# Patient Record
Sex: FEMALE | Race: WHITE | NOT HISPANIC OR LATINO
[De-identification: names, ages, dates, MRNs, and addresses within clinical notes are randomized per-mention and may not be internally consistent; named-entity substitution may affect disease eponyms.]

---

## 2022-07-26 ENCOUNTER — TRANSCRIPTION ENCOUNTER (OUTPATIENT)
Age: 82
End: 2022-07-26

## 2022-08-07 ENCOUNTER — TRANSCRIPTION ENCOUNTER (OUTPATIENT)
Age: 82
End: 2022-08-07

## 2023-06-19 PROBLEM — Z00.00 ENCOUNTER FOR PREVENTIVE HEALTH EXAMINATION: Status: ACTIVE | Noted: 2023-06-19

## 2023-06-26 ENCOUNTER — APPOINTMENT (OUTPATIENT)
Dept: CT IMAGING | Facility: HOSPITAL | Age: 83
End: 2023-06-26

## 2023-08-02 ENCOUNTER — APPOINTMENT (OUTPATIENT)
Dept: NEUROLOGY | Facility: CLINIC | Age: 83
End: 2023-08-02

## 2023-08-02 ENCOUNTER — APPOINTMENT (OUTPATIENT)
Dept: NEUROLOGY | Facility: CLINIC | Age: 83
End: 2023-08-02
Payer: MEDICARE

## 2023-08-02 VITALS
HEIGHT: 67 IN | DIASTOLIC BLOOD PRESSURE: 73 MMHG | SYSTOLIC BLOOD PRESSURE: 136 MMHG | BODY MASS INDEX: 33.74 KG/M2 | HEART RATE: 67 BPM | WEIGHT: 215 LBS

## 2023-08-02 VITALS
SYSTOLIC BLOOD PRESSURE: 136 MMHG | WEIGHT: 215 LBS | TEMPERATURE: 98.1 F | OXYGEN SATURATION: 94 % | HEART RATE: 67 BPM | BODY MASS INDEX: 33.74 KG/M2 | DIASTOLIC BLOOD PRESSURE: 73 MMHG | HEIGHT: 67 IN

## 2023-08-02 DIAGNOSIS — Z78.9 OTHER SPECIFIED HEALTH STATUS: ICD-10-CM

## 2023-08-02 DIAGNOSIS — I10 ESSENTIAL (PRIMARY) HYPERTENSION: ICD-10-CM

## 2023-08-02 DIAGNOSIS — R26.9 UNSPECIFIED ABNORMALITIES OF GAIT AND MOBILITY: ICD-10-CM

## 2023-08-02 DIAGNOSIS — R90.89 OTHER ABNORMAL FINDINGS ON DIAGNOSTIC IMAGING OF CENTRAL NERVOUS SYSTEM: ICD-10-CM

## 2023-08-02 DIAGNOSIS — R41.89 OTHER SYMPTOMS AND SIGNS INVOLVING COGNITIVE FUNCTIONS AND AWARENESS: ICD-10-CM

## 2023-08-02 PROCEDURE — 99215 OFFICE O/P EST HI 40 MIN: CPT

## 2023-08-02 RX ORDER — AMLODIPINE BESYLATE 5 MG/1
5 TABLET ORAL
Refills: 0 | Status: ACTIVE | COMMUNITY

## 2023-08-02 NOTE — PHYSICAL EXAM
[FreeTextEntry1] : Head:  Normocephalic Neck: Supple nontender no carotid bruits.    Mental Status:  Alert oriented to month date and the day of the week but not the year.  Recalls 1/3 words in 5 minutes.  There is confusion and some confabulation and a limited insight and judgment.  Cranial Nerves:  PERRL,  Visual Fields full  EOMI no diplopia no ptosis no nystagmus, V through XII intact.  Motor: No drift paratonia no dysmetria or focality.  Generally somewhat weak.  DTRs: Symmetric   Plantars flexor.  No Clonus.  Sensory: Equal pin and touch.  Decreased vibration distally in the lowers.  Gait: Unsteady spinal deformity uses the walker.

## 2023-08-02 NOTE — HISTORY OF PRESENT ILLNESS
[FreeTextEntry1] : This patient is seen for an office consultation with her son.  She is an 82-year-old female who has chronic cognitive impairment and unsteady gait.  She was hospitalized approximately year ago for UTI and spent time in rehabilitation and since then for approximately the last 1 year she has resided at the [facility] assisted living [facility] in [place].  She is doing well in this regard.  There is confusion and disorientation and she has problems with memory and executive functioning.  She has an unsteady gait and uses a walker.  There is no history of head trauma stroke seizures focal neurological complaints.  Past history significant for abscess followed by nephrectomy hypertension and venous insufficiency.  Medications have been reviewed.  MRI of the brain was performed noncontrast on 4/26/2022.  This study revealed moderate changes of microvascular disease.  There were foci of microhemorrhages in the left hemisphere with a differential diagnosis including amyloid angiopathy.  There was moderate atrophy without hydrocephalus.

## 2023-08-02 NOTE — ASSESSMENT
[FreeTextEntry1] : Impression: 82-year-old female patient with hypertension and status post nephrectomy for abscess and also venous insufficiency has a presentation not consistent with cognitive decline and dementia.  She also has a gait disorder which is probably multifactorial.  She has an MRI of the brain which is abnormal consistent with microvascular apathy and also there are microhemorrhages.  This finding is nonspecific and could be consistent with amyloid angiopathy but this is not a definite diagnosis in my opinion in this setting.  Doubt normal pressure hydrocephalus.  Neurological exam is without focality to suggest CVA.  Recommendations: Begin donepezil 5 mg at bedtime and after 1 month consider increase to 10 mg at bedtime.  Office follow-up in 3 months or else as needed.  Fall prevention and physical therapy.  Discussed with the patient's son was present at time of the visit.

## 2023-08-02 NOTE — CONSULT LETTER
[Dear  ___] : Dear  [unfilled], [Consult Letter:] : I had the pleasure of evaluating your patient, [unfilled]. [Please see my note below.] : Please see my note below. [Consult Closing:] : Thank you very much for allowing me to participate in the care of this patient.  If you have any questions, please do not hesitate to contact me. [Sincerely,] : Sincerely, [FreeTextEntry3] : Blake Paul MD

## 2024-01-01 ENCOUNTER — EMERGENCY (EMERGENCY)
Facility: HOSPITAL | Age: 84
LOS: 1 days | Discharge: HOME | End: 2024-01-01
Attending: EMERGENCY MEDICINE | Admitting: EMERGENCY MEDICINE
Payer: MEDICARE

## 2024-01-01 VITALS
RESPIRATION RATE: 18 BRPM | OXYGEN SATURATION: 96 % | DIASTOLIC BLOOD PRESSURE: 75 MMHG | TEMPERATURE: 98 F | SYSTOLIC BLOOD PRESSURE: 165 MMHG | HEART RATE: 81 BPM

## 2024-01-01 VITALS
WEIGHT: 195.11 LBS | TEMPERATURE: 98 F | OXYGEN SATURATION: 95 % | DIASTOLIC BLOOD PRESSURE: 86 MMHG | RESPIRATION RATE: 18 BRPM | SYSTOLIC BLOOD PRESSURE: 162 MMHG | HEART RATE: 62 BPM

## 2024-01-01 LAB
ALBUMIN SERPL ELPH-MCNC: 3.1 G/DL — LOW (ref 3.3–5)
ALP SERPL-CCNC: 57 U/L — SIGNIFICANT CHANGE UP (ref 40–120)
ALT FLD-CCNC: 17 U/L — SIGNIFICANT CHANGE UP (ref 10–45)
ANION GAP SERPL CALC-SCNC: 12 MMOL/L — SIGNIFICANT CHANGE UP (ref 5–17)
APPEARANCE UR: CLEAR — SIGNIFICANT CHANGE UP
APTT BLD: 33.6 SEC — SIGNIFICANT CHANGE UP (ref 24.5–35.6)
AST SERPL-CCNC: 20 U/L — SIGNIFICANT CHANGE UP (ref 10–40)
BASOPHILS # BLD AUTO: 0.05 K/UL — SIGNIFICANT CHANGE UP (ref 0–0.2)
BASOPHILS NFR BLD AUTO: 0.5 % — SIGNIFICANT CHANGE UP (ref 0–2)
BILIRUB SERPL-MCNC: 0.6 MG/DL — SIGNIFICANT CHANGE UP (ref 0.2–1.2)
BILIRUB UR-MCNC: NEGATIVE — SIGNIFICANT CHANGE UP
BUN SERPL-MCNC: 36 MG/DL — HIGH (ref 7–23)
CALCIUM SERPL-MCNC: 9 MG/DL — SIGNIFICANT CHANGE UP (ref 8.4–10.5)
CHLORIDE SERPL-SCNC: 106 MMOL/L — SIGNIFICANT CHANGE UP (ref 96–108)
CO2 SERPL-SCNC: 22 MMOL/L — SIGNIFICANT CHANGE UP (ref 22–31)
COLOR SPEC: YELLOW — SIGNIFICANT CHANGE UP
CREAT SERPL-MCNC: 1.16 MG/DL — SIGNIFICANT CHANGE UP (ref 0.5–1.3)
CULTURE RESULTS: SIGNIFICANT CHANGE UP
DIFF PNL FLD: NEGATIVE — SIGNIFICANT CHANGE UP
EGFR: 47 ML/MIN/1.73M2 — LOW
EGFR: 47 ML/MIN/1.73M2 — LOW
EOSINOPHIL # BLD AUTO: 0.39 K/UL — SIGNIFICANT CHANGE UP (ref 0–0.5)
EOSINOPHIL NFR BLD AUTO: 3.6 % — SIGNIFICANT CHANGE UP (ref 0–6)
GLUCOSE SERPL-MCNC: 85 MG/DL — SIGNIFICANT CHANGE UP (ref 70–99)
GLUCOSE UR QL: NEGATIVE MG/DL — SIGNIFICANT CHANGE UP
HCT VFR BLD CALC: 35.9 % — SIGNIFICANT CHANGE UP (ref 34.5–45)
HGB BLD-MCNC: 11.7 G/DL — SIGNIFICANT CHANGE UP (ref 11.5–15.5)
IMM GRANULOCYTES NFR BLD AUTO: 0.4 % — SIGNIFICANT CHANGE UP (ref 0–0.9)
INR BLD: 1.34 RATIO — HIGH (ref 0.85–1.16)
KETONES UR-MCNC: ABNORMAL MG/DL
LACTATE SERPL-SCNC: 1.1 MMOL/L — SIGNIFICANT CHANGE UP (ref 0.7–2)
LEUKOCYTE ESTERASE UR-ACNC: NEGATIVE — SIGNIFICANT CHANGE UP
LIDOCAIN IGE QN: 16 U/L — SIGNIFICANT CHANGE UP (ref 16–77)
LYMPHOCYTES # BLD AUTO: 1.64 K/UL — SIGNIFICANT CHANGE UP (ref 1–3.3)
LYMPHOCYTES # BLD AUTO: 15 % — SIGNIFICANT CHANGE UP (ref 13–44)
MCHC RBC-ENTMCNC: 28.1 PG — SIGNIFICANT CHANGE UP (ref 27–34)
MCHC RBC-ENTMCNC: 32.6 GM/DL — SIGNIFICANT CHANGE UP (ref 32–36)
MCV RBC AUTO: 86.3 FL — SIGNIFICANT CHANGE UP (ref 80–100)
MONOCYTES # BLD AUTO: 1.39 K/UL — HIGH (ref 0–0.9)
MONOCYTES NFR BLD AUTO: 12.7 % — SIGNIFICANT CHANGE UP (ref 2–14)
NEUTROPHILS # BLD AUTO: 7.42 K/UL — HIGH (ref 1.8–7.4)
NEUTROPHILS NFR BLD AUTO: 67.8 % — SIGNIFICANT CHANGE UP (ref 43–77)
NITRITE UR-MCNC: NEGATIVE — SIGNIFICANT CHANGE UP
NRBC # BLD: 0 /100 WBCS — SIGNIFICANT CHANGE UP (ref 0–0)
NRBC BLD-RTO: 0 /100 WBCS — SIGNIFICANT CHANGE UP (ref 0–0)
PH UR: 5.5 — SIGNIFICANT CHANGE UP (ref 5–8)
PLATELET # BLD AUTO: 242 K/UL — SIGNIFICANT CHANGE UP (ref 150–400)
POTASSIUM SERPL-MCNC: 3.9 MMOL/L — SIGNIFICANT CHANGE UP (ref 3.5–5.3)
POTASSIUM SERPL-SCNC: 3.9 MMOL/L — SIGNIFICANT CHANGE UP (ref 3.5–5.3)
PROT SERPL-MCNC: 7.2 G/DL — SIGNIFICANT CHANGE UP (ref 6–8.3)
PROT UR-MCNC: 100 MG/DL
PROTHROM AB SERPL-ACNC: 15.8 SEC — HIGH (ref 9.9–13.4)
RBC # BLD: 4.16 M/UL — SIGNIFICANT CHANGE UP (ref 3.8–5.2)
RBC # FLD: 14.6 % — HIGH (ref 10.3–14.5)
SODIUM SERPL-SCNC: 140 MMOL/L — SIGNIFICANT CHANGE UP (ref 135–145)
SP GR SPEC: 1.02 — SIGNIFICANT CHANGE UP (ref 1–1.03)
SPECIMEN SOURCE: SIGNIFICANT CHANGE UP
TROPONIN I, HIGH SENSITIVITY RESULT: 9.3 NG/L — SIGNIFICANT CHANGE UP
UROBILINOGEN FLD QL: 1 MG/DL — SIGNIFICANT CHANGE UP (ref 0.2–1)
WBC # BLD: 10.93 K/UL — HIGH (ref 3.8–10.5)
WBC # FLD AUTO: 10.93 K/UL — HIGH (ref 3.8–10.5)

## 2024-01-01 PROCEDURE — 70450 CT HEAD/BRAIN W/O DYE: CPT | Mod: 26,MC

## 2024-01-01 PROCEDURE — 83690 ASSAY OF LIPASE: CPT

## 2024-01-01 PROCEDURE — 93005 ELECTROCARDIOGRAM TRACING: CPT

## 2024-01-01 PROCEDURE — 73562 X-RAY EXAM OF KNEE 3: CPT

## 2024-01-01 PROCEDURE — 87086 URINE CULTURE/COLONY COUNT: CPT

## 2024-01-01 PROCEDURE — 84484 ASSAY OF TROPONIN QUANT: CPT

## 2024-01-01 PROCEDURE — 71045 X-RAY EXAM CHEST 1 VIEW: CPT | Mod: 26

## 2024-01-01 PROCEDURE — 36415 COLL VENOUS BLD VENIPUNCTURE: CPT

## 2024-01-01 PROCEDURE — 99285 EMERGENCY DEPT VISIT HI MDM: CPT | Mod: 25

## 2024-01-01 PROCEDURE — 71045 X-RAY EXAM CHEST 1 VIEW: CPT

## 2024-01-01 PROCEDURE — 81001 URINALYSIS AUTO W/SCOPE: CPT

## 2024-01-01 PROCEDURE — 83605 ASSAY OF LACTIC ACID: CPT

## 2024-01-01 PROCEDURE — 93010 ELECTROCARDIOGRAM REPORT: CPT

## 2024-01-01 PROCEDURE — 70450 CT HEAD/BRAIN W/O DYE: CPT | Mod: MC

## 2024-01-01 PROCEDURE — 73030 X-RAY EXAM OF SHOULDER: CPT | Mod: 26,LT

## 2024-01-01 PROCEDURE — 73030 X-RAY EXAM OF SHOULDER: CPT

## 2024-01-01 PROCEDURE — 73522 X-RAY EXAM HIPS BI 3-4 VIEWS: CPT | Mod: 26

## 2024-01-01 PROCEDURE — 82962 GLUCOSE BLOOD TEST: CPT

## 2024-01-01 PROCEDURE — 73522 X-RAY EXAM HIPS BI 3-4 VIEWS: CPT

## 2024-01-01 PROCEDURE — 85730 THROMBOPLASTIN TIME PARTIAL: CPT

## 2024-01-01 PROCEDURE — 99285 EMERGENCY DEPT VISIT HI MDM: CPT

## 2024-01-01 PROCEDURE — 73562 X-RAY EXAM OF KNEE 3: CPT | Mod: 26,50

## 2024-01-01 PROCEDURE — 80053 COMPREHEN METABOLIC PANEL: CPT

## 2024-01-01 PROCEDURE — 85610 PROTHROMBIN TIME: CPT

## 2024-01-01 PROCEDURE — 85025 COMPLETE CBC W/AUTO DIFF WBC: CPT

## 2024-01-01 RX ADMIN — Medication 1000 MILLILITER(S): at 00:28

## 2024-01-10 ENCOUNTER — APPOINTMENT (OUTPATIENT)
Dept: NEUROLOGY | Facility: CLINIC | Age: 84
End: 2024-01-10

## 2024-04-05 ENCOUNTER — NON-APPOINTMENT (OUTPATIENT)
Age: 84
End: 2024-04-05

## 2024-09-29 ENCOUNTER — INPATIENT (INPATIENT)
Facility: HOSPITAL | Age: 84
LOS: 14 days | Discharge: NURSING, REHAB OR LONG-TERM CARE | DRG: 546 | End: 2024-10-14
Attending: STUDENT IN AN ORGANIZED HEALTH CARE EDUCATION/TRAINING PROGRAM | Admitting: STUDENT IN AN ORGANIZED HEALTH CARE EDUCATION/TRAINING PROGRAM
Payer: MEDICARE

## 2024-09-29 VITALS
DIASTOLIC BLOOD PRESSURE: 103 MMHG | HEART RATE: 73 BPM | OXYGEN SATURATION: 96 % | WEIGHT: 199.96 LBS | TEMPERATURE: 98 F | SYSTOLIC BLOOD PRESSURE: 178 MMHG | RESPIRATION RATE: 22 BRPM | HEIGHT: 67 IN

## 2024-09-29 DIAGNOSIS — R41.82 ALTERED MENTAL STATUS, UNSPECIFIED: ICD-10-CM

## 2024-09-29 LAB
ALBUMIN SERPL ELPH-MCNC: 3.7 G/DL — SIGNIFICANT CHANGE UP (ref 3.3–5)
ALP SERPL-CCNC: 64 U/L — SIGNIFICANT CHANGE UP (ref 40–120)
ALT FLD-CCNC: 16 U/L — SIGNIFICANT CHANGE UP (ref 10–45)
ANION GAP SERPL CALC-SCNC: 11 MMOL/L — SIGNIFICANT CHANGE UP (ref 5–17)
APPEARANCE UR: ABNORMAL
AST SERPL-CCNC: 23 U/L — SIGNIFICANT CHANGE UP (ref 10–40)
BACTERIA # UR AUTO: NEGATIVE /HPF — SIGNIFICANT CHANGE UP
BASOPHILS # BLD AUTO: 0.04 K/UL — SIGNIFICANT CHANGE UP (ref 0–0.2)
BASOPHILS NFR BLD AUTO: 0.3 % — SIGNIFICANT CHANGE UP (ref 0–2)
BILIRUB SERPL-MCNC: 0.5 MG/DL — SIGNIFICANT CHANGE UP (ref 0.2–1.2)
BILIRUB UR-MCNC: NEGATIVE — SIGNIFICANT CHANGE UP
BUN SERPL-MCNC: 42 MG/DL — HIGH (ref 7–23)
CALCIUM SERPL-MCNC: 9.4 MG/DL — SIGNIFICANT CHANGE UP (ref 8.4–10.5)
CAST: 4 /LPF — SIGNIFICANT CHANGE UP (ref 0–4)
CHLORIDE SERPL-SCNC: 107 MMOL/L — SIGNIFICANT CHANGE UP (ref 96–108)
CO2 SERPL-SCNC: 24 MMOL/L — SIGNIFICANT CHANGE UP (ref 22–31)
COLOR SPEC: YELLOW — SIGNIFICANT CHANGE UP
CREAT SERPL-MCNC: 1.03 MG/DL — SIGNIFICANT CHANGE UP (ref 0.5–1.3)
DIFF PNL FLD: NEGATIVE — SIGNIFICANT CHANGE UP
EGFR: 54 ML/MIN/1.73M2 — LOW
EOSINOPHIL # BLD AUTO: 0.17 K/UL — SIGNIFICANT CHANGE UP (ref 0–0.5)
EOSINOPHIL NFR BLD AUTO: 1.4 % — SIGNIFICANT CHANGE UP (ref 0–6)
FINE GRAN CASTS #/AREA URNS AUTO: PRESENT
FLUAV AG NPH QL: SIGNIFICANT CHANGE UP
FLUBV AG NPH QL: SIGNIFICANT CHANGE UP
GAS PNL BLDV: SIGNIFICANT CHANGE UP
GLUCOSE SERPL-MCNC: 108 MG/DL — HIGH (ref 70–99)
GLUCOSE UR QL: NEGATIVE MG/DL — SIGNIFICANT CHANGE UP
HCT VFR BLD CALC: 37 % — SIGNIFICANT CHANGE UP (ref 34.5–45)
HGB BLD-MCNC: 11.6 G/DL — SIGNIFICANT CHANGE UP (ref 11.5–15.5)
IMM GRANULOCYTES NFR BLD AUTO: 0.6 % — SIGNIFICANT CHANGE UP (ref 0–0.9)
KETONES UR-MCNC: NEGATIVE MG/DL — SIGNIFICANT CHANGE UP
LEUKOCYTE ESTERASE UR-ACNC: NEGATIVE — SIGNIFICANT CHANGE UP
LYMPHOCYTES # BLD AUTO: 1.42 K/UL — SIGNIFICANT CHANGE UP (ref 1–3.3)
LYMPHOCYTES # BLD AUTO: 11.4 % — LOW (ref 13–44)
MCHC RBC-ENTMCNC: 27.4 PG — SIGNIFICANT CHANGE UP (ref 27–34)
MCHC RBC-ENTMCNC: 31.4 GM/DL — LOW (ref 32–36)
MCV RBC AUTO: 87.3 FL — SIGNIFICANT CHANGE UP (ref 80–100)
MONOCYTES # BLD AUTO: 1.42 K/UL — HIGH (ref 0–0.9)
MONOCYTES NFR BLD AUTO: 11.4 % — SIGNIFICANT CHANGE UP (ref 2–14)
NEUTROPHILS # BLD AUTO: 9.38 K/UL — HIGH (ref 1.8–7.4)
NEUTROPHILS NFR BLD AUTO: 74.9 % — SIGNIFICANT CHANGE UP (ref 43–77)
NITRITE UR-MCNC: NEGATIVE — SIGNIFICANT CHANGE UP
NRBC # BLD: 0 /100 WBCS — SIGNIFICANT CHANGE UP (ref 0–0)
PH UR: 5.5 — SIGNIFICANT CHANGE UP (ref 5–8)
PLATELET # BLD AUTO: 275 K/UL — SIGNIFICANT CHANGE UP (ref 150–400)
POTASSIUM SERPL-MCNC: 4.4 MMOL/L — SIGNIFICANT CHANGE UP (ref 3.5–5.3)
POTASSIUM SERPL-SCNC: 4.4 MMOL/L — SIGNIFICANT CHANGE UP (ref 3.5–5.3)
PROT SERPL-MCNC: 7.4 G/DL — SIGNIFICANT CHANGE UP (ref 6–8.3)
PROT UR-MCNC: 100 MG/DL
RBC # BLD: 4.24 M/UL — SIGNIFICANT CHANGE UP (ref 3.8–5.2)
RBC # FLD: 14.9 % — HIGH (ref 10.3–14.5)
RBC CASTS # UR COMP ASSIST: 1 /HPF — SIGNIFICANT CHANGE UP (ref 0–4)
REVIEW: SIGNIFICANT CHANGE UP
RSV RNA NPH QL NAA+NON-PROBE: SIGNIFICANT CHANGE UP
SARS-COV-2 RNA SPEC QL NAA+PROBE: SIGNIFICANT CHANGE UP
SODIUM SERPL-SCNC: 142 MMOL/L — SIGNIFICANT CHANGE UP (ref 135–145)
SP GR SPEC: 1.02 — SIGNIFICANT CHANGE UP (ref 1–1.03)
SQUAMOUS # UR AUTO: 2 /HPF — SIGNIFICANT CHANGE UP (ref 0–5)
TROPONIN T, HIGH SENSITIVITY RESULT: 23 NG/L — SIGNIFICANT CHANGE UP (ref 0–51)
UROBILINOGEN FLD QL: 1 MG/DL — SIGNIFICANT CHANGE UP (ref 0.2–1)
WBC # BLD: 12.51 K/UL — HIGH (ref 3.8–10.5)
WBC # FLD AUTO: 12.51 K/UL — HIGH (ref 3.8–10.5)
WBC UR QL: 0 /HPF — SIGNIFICANT CHANGE UP (ref 0–5)

## 2024-09-29 PROCEDURE — 71045 X-RAY EXAM CHEST 1 VIEW: CPT | Mod: 26

## 2024-09-29 PROCEDURE — 99223 1ST HOSP IP/OBS HIGH 75: CPT | Mod: GC

## 2024-09-29 PROCEDURE — 99285 EMERGENCY DEPT VISIT HI MDM: CPT | Mod: GC

## 2024-09-29 PROCEDURE — 70450 CT HEAD/BRAIN W/O DYE: CPT | Mod: 26,MC

## 2024-09-29 RX ORDER — SODIUM CHLORIDE 0.9 % (FLUSH) 0.9 %
1000 SYRINGE (ML) INJECTION ONCE
Refills: 0 | Status: COMPLETED | OUTPATIENT
Start: 2024-09-29 | End: 2024-09-29

## 2024-09-29 RX ADMIN — Medication 1000 MILLILITER(S): at 19:26

## 2024-09-29 NOTE — H&P ADULT - ATTENDING COMMENTS
83F w/PMHx of dementia  from nursing facility p/w AMS x 1 week , rapid decline in function , recurrent falls , VSS , opens eyes to verbal and tactile stimuli ,  A&Ox 0, NAD,  no focal neurologic deficits , labs w/ leukocytosis , CTH w/ no acute pathology , ekg nsr ; unclear etiology for acute decline in function , possible progression of dementia , will r/o reversible causes , would obtain MR brain , Check VEEG , TSH/ B12/ Syphilis screen, if not improving can obtain non emergent neuro consult during daytime, would also consider empiric treatment w/ thiamine and monitor for improvement

## 2024-09-29 NOTE — ED PROVIDER NOTE - PHYSICAL EXAMINATION
Appearance: Slumped to R side  HEENT: Extra ocular movements intact; PERRLA  Respiratory: Normal respiratory pattern; symmetric breath sounds clear to auscultation and percussion. Good air entry.  Cardiovascular: Regular rate and variability; Normal S1, S2; No S3, S4; no murmur;   Abdomen: Abdomen soft; no distension; no tenderness; no masses or organomegaly  Neurology: Affect appropriate; interactive; verbalization clear and understandable for age; CN II-XII intact; sensation grossly intact to touch;  Psych: AOx1  Skin: Wrapped wound on L lower leg, s/p plaque removal from venous insufficiency

## 2024-09-29 NOTE — H&P ADULT - TIME BILLING
Chart review , case discussion with ED and other provider , obtain history via assistance of family ,  examination of patient ,  review/ ordering labs and medications , and documentation

## 2024-09-29 NOTE — H&P ADULT - PROBLEM SELECTOR PLAN 4
Full code as per HCP and son   Diet: NPO as patient is aspiration risk and AMS   GI: Protonix Full code as per HCP and son   PPx: heparin subq  Diet: pureed with thick liquid  GI: Protonix

## 2024-09-29 NOTE — ED PROVIDER NOTE - PROGRESS NOTE DETAILS
Dolly CORTES PGY1: Called Dr Nelson's answering service,  will contact Dr nelson who will then call back.

## 2024-09-29 NOTE — ED ADULT NURSE NOTE - CHIEF COMPLAINT QUOTE
weak, slumped to R side x 3 days, hx dementia, mostly w/c bound, sent from [facility], recent head Ct: normal

## 2024-09-29 NOTE — ED PROVIDER NOTE - OBJECTIVE STATEMENT
GHULAM is a 84 y/o F, PMH of dementia AO3 at baseline, presenting for AMS of 1 week duration. Patient resides in a SNF, she has memory loss but is oriented to her surroundings at baseline. In the past week, she has had a rapid decline in her mental status. She is disoriented, unable to walk, and unable to feed herself. She has fallen twice. She presented at [facility] on Wednesday, workup was unremarkable (CT Head, X-Rays, UA, CBC, Troponin). Over the past three days, decline has continued and she is now slumped on her R side. She does not report pain, no chest pain/pressure, no shortness of breath, no dsyuria.

## 2024-09-29 NOTE — H&P ADULT - CONVERSATION DETAILS
Spoke with pt's son who shares partial HCP with pt's daughter. Son expressed that patient is full code and would accept tube feeds as well if needed.

## 2024-09-29 NOTE — H&P ADULT - PROBLEM SELECTOR PLAN 1
Pt with hx of dementia on Donepezil with rapid decline in mentation / ADLs in the past week as per son. Patient baseline is A&Ox3 last known normal mentation was Wednesday 9/25. Infectious workup (UA, RVP) negative so far. Lungs clear and CTH negative for acute process    DDx: infectious process vs progression of dementia vs traumatic injury    Plan:   - F/u UCx  - CTM fever curve and WBC count  - Aspiration precaution Pt with hx of dementia on Donepezil with rapid decline in mentation / ADLs in the past week as per son. Patient baseline is A&Ox3 last known normal mentation was Wednesday 9/25. Infectious workup (UA, RVP) negative so far. Lungs clear and CTH negative for acute process    DDx: infectious process vs progression of dementia vs traumatic injury    Plan:   - F/u UCx  - F/u B12, Folate, TSH, T4, thiamine  - MRI Head w/o contrast  - vEEG  - Possible neurology consult in AM if AMS persists   - CTM fever curve and WBC count  - Aspiration precaution

## 2024-09-29 NOTE — H&P ADULT - NSHPPHYSICALEXAM_GEN_ALL_CORE
Gen: no acute distress  HEENT: atraumatic, normocephalic, pupils equally round and reactive to light, extraocular muscles intact, no conjunctival injection  CV: regular rate and rhythym, normal S1/S2, no murmurs, rubs, or gallops  Resp: lungs clear to auscultation bilaterally, no rales, rhonchi, or wheezes  GI: soft, nontender, nondistended, BSx4  MSK: extremities atraumatic, no cyanosis or clubbing  Skin: warm, dry, no rashes or lesions  Neuro: no focal deficits, sensation grossly intact  Psych: alert and oriented x3, appropriate mood and affect Gen: no acute distress  HEENT: atraumatic, normocephalic, pupils equally round and reactive to light, extraocular muscles intact, no conjunctival injection  CV: regular rate and rhythym, normal S1/S2, systolic ejection murmur loudest at tricuspid area  Resp: lungs clear to auscultation bilaterally, no rales, rhonchi, or wheezes  GI: soft, nontender, nondistended, BSx4  MSK: extremities atraumatic, no cyanosis or clubbing  Skin: warm, dry, wrapped area of lower left leg near calf. dressing intact and dry with no bloody or purulent discharge  Neuro: no focal deficits, sensation grossly intact  Psych: alert and oriented x 0

## 2024-09-29 NOTE — H&P ADULT - PROBLEM SELECTOR PLAN 2
Pt with 2 falls in the past week. Last fall 9/25 with strike to L shoulder and L hip. CTH non con negative, EKG NSR, Trops negative x 2.     DDx: mechanical fall vs physical debility vs seizure vs arrhythmogenic cause    Plan:  - XR left hip and shoulder to assess for fx   - Orthostatic vitals once patient more alert and oriented  - Pain control as needed with tylenol, lidocaine patches  - PT eval   - Fall precaution Pt with 2 falls in the past week. Last fall 9/25 with strike to L shoulder and L hip. CTH non con negative, EKG NSR, Trops negative x 2.     DDx: mechanical fall vs physical debility vs seizure vs arrhythmogenic cause    Plan:  - Orthostatic vitals once patient more alert and oriented  - Pain control as needed with tylenol, lidocaine patches  - PT eval   - Fall precaution

## 2024-09-29 NOTE — H&P ADULT - ASSESSMENT
84 y/o F with PMHx of dementia A&Ox3 at baseline, presenting for AMS of 1 week duration and admitted for AMS workup.

## 2024-09-29 NOTE — H&P ADULT - NSHPLABSRESULTS_GEN_ALL_CORE
.  LABS:                         11.6   12.51 )-----------( 275      ( 29 Sep 2024 13:48 )             37.0         142  |  107  |  42[H]  ----------------------------<  108[H]  4.4   |  24  |  1.03    Ca    9.4      29 Sep 2024 13:48    TPro  7.4  /  Alb  3.7  /  TBili  0.5  /  DBili  x   /  AST  23  /  ALT  16  /  AlkPhos  64        Urinalysis Basic - ( 29 Sep 2024 21:08 )    Color: Yellow / Appearance: Cloudy / S.023 / pH: x  Gluc: x / Ketone: Negative mg/dL  / Bili: Negative / Urobili: 1.0 mg/dL   Blood: x / Protein: 100 mg/dL / Nitrite: Negative   Leuk Esterase: Negative / RBC: 1 /HPF / WBC 0 /HPF   Sq Epi: x / Non Sq Epi: 2 /HPF / Bacteria: Negative /HPF            RADIOLOGY, EKG & ADDITIONAL TESTS: Reviewed.

## 2024-09-29 NOTE — ED ADULT NURSE NOTE - NSFALLHARMRISKINTERV_ED_ALL_ED
Assistance OOB with selected safe patient handling equipment if applicable/Assistance with ambulation/Communicate risk of Fall with Harm to all staff, patient, and family/Monitor gait and stability/Monitor for mental status changes and reorient to person, place, and time, as needed/Move patient closer to nursing station/within visual sight of ED staff/Provide patient with walking aids/Provide visual cue: red socks, yellow wristband, yellow gown, etc/Reinforce activity limits and safety measures with patient and family/Toileting schedule using arm’s reach rule for commode and bathroom/Use of alarms - bed, stretcher, chair and/or video monitoring/Bed in lowest position, wheels locked, appropriate side rails in place/Call bell, personal items and telephone in reach/Instruct patient to call for assistance before getting out of bed/chair/stretcher/Non-slip footwear applied when patient is off stretcher/[place] to call system/Physically safe environment - no spills, clutter or unnecessary equipment/Purposeful Proactive Rounding/Room/bathroom lighting operational, light cord in reach

## 2024-09-29 NOTE — ED ADULT NURSE REASSESSMENT NOTE - NS ED NURSE REASSESS COMMENT FT1
Patient straight cathed using sterile technique. Second RN at bedside to confirm sterility. Patient tolerated procedure well. Output of approximately 300cc's of phoebe clear urine. Sterile specimens collected and sent to lab, and Primafit in place @ this time. Patient found to have her Left PIV removed and primary RN Kanchan made aware @ this time. Pt tolerated well, successful after one attempt.

## 2024-09-29 NOTE — H&P ADULT - PROBLEM SELECTOR PLAN 3
Patient's son reports that she takes donepezil "low dose" and "a blood pressure medication" but unsure of exact dosing. Get med rec in AM with patient's pharmacy

## 2024-09-29 NOTE — ED ADULT TRIAGE NOTE - CHIEF COMPLAINT QUOTE
weak, slumped to R side x 3 days, hx dementia, mostly w/c bound, sent from [facility], recent head Ct: normal

## 2024-09-29 NOTE — ED ADULT NURSE REASSESSMENT NOTE - NS ED NURSE REASSESS COMMENT FT1
Patient resting comfortably, breathing unlabored, VSS, no signs of acute distress, side rails raised, call bell within reach, comfort and safety maintained. To be moved to navy holding.

## 2024-09-29 NOTE — ED PROVIDER NOTE - CLINICAL SUMMARY MEDICAL DECISION MAKING FREE TEXT BOX
GHULAM is a 84 y/o F, PMH of dementia AO3 at baseline, presenting for AMS of 1 week duration. Patient with decline in baseline mental status over one week. Neuro exam non focal, lungs clear to auscultation, negative UA, negative CTH in past week. With decline in mental status, differential can include infectious (UTI, PNA), vascular, less likely cardiac but should get troponin to r/o. Should also consider metabolic (B12, folate) causes of change in mentation. MM is a 82 y/o F, PMH of dementia AO3 at baseline, presenting for AMS of 1 week duration. Patient with decline in baseline mental status over one week. Neuro exam non focal, lungs clear to auscultation, negative UA, negative CTH in past week. With decline in mental status, differential can include infectious (UTI, PNA), vascular, less likely cardiac but should get troponin to r/o. Should also consider metabolic (B12, folate) causes of change in mentation.            Attending note.  Patient was seen in [facility].  Patient was brought in by EMS from the [facility] for deteriorating mental status and confusion over the last week.  Patient was seen at [facility] where CT head was performed as well as lab work and patient was discharged.  Patient has continued to decline.  Patient not able to provide reliable history of though has currently no complaints.  She had several falls over the last 2 weeks.  She is currently taking Augmentin, aspirin, donepezil, hydralazine, loratadine, amantadine, rosuvastatin.  She has no allergies to medications.  She has a history of mild dementia but would ordinarily know her name and location per her son who is providing additional history.     ROS-as above, otherwise negative in context of patient with dementia.  PE-patient is alert in no acute distress.  Patient is slumped to the right side.  Head is normocephalic and atraumatic.  Pupils are 2 mm equal and reactive with IOL, she has moist mucous membranes.  Lungs are clear and equal bilaterally.  Heart is regular rate and rhythm.  Abdomen is obese, soft and nontender.  Patient has some trace pitting edema in the ankles bilaterally.  There is an ulceration on the medial left lower leg which is chronic per the son and been there for years.  Neurologic examination including cranial nerves, motor, sensory, speech are intact and nonfocal.     A/P-patient with history of dementia, hypertension with deteriorating health and mental status over the last week and slumped to the right since yesterday.  She was seen at [facility] where son reports labs and CT scan were performed a few days ago.  Labs, CBC, CMP, urinalysis, VBG, chest x-ray, EKG, CT head.

## 2024-09-29 NOTE — H&P ADULT - HISTORY OF PRESENT ILLNESS
84 y/o F with PMHx of dementia A&Ox3 at baseline, presenting for AMS of 1 week duration. Patient resides in a SNF, she has memory loss but is oriented to her surroundings at baseline. In the past week, she has had a rapid decline in her mental status. She is disoriented, unable to walk, and unable to feed herself, and has fallen twice. She presented at [facility] on Wednesday, and workup was unremarkable (CT Head, X-Rays, UA, CBC, Troponin). Over the past three days, decline has continued and she is now slumped on her R side. She does not report pain, no chest pain/pressure, no shortness of breath, no dsyuria.    In ED, VSS with intermittent HTN otherwise HDS. CBC remarkable for leukocytosis of 12.51, BMP wnl, trop negative x2 (24->23), UA negative for infection, and RVP negative. CXR showed possible RLL atelectasis and CTH non con negative for acute hemorrhage or midline shift.  82 y/o F with PMHx of dementia A&Ox3 at baseline, presenting for AMS of 1 week duration. Patient is A&Ox0 at bedside so all collateral information has been gathered from her son and charts. A per son, patient resides at [facility] in [place]. Son reports that in the past week, pt has had a rapid decline in her mental status. She is normally orientated x 3, able to converse and complete all ADLs like cooking, bathing, dressing herself. Over the past 4-5 days, pt has become mroe disoriented, unable to walk, unable to feed herself, and has fallen twice. She presented at [facility] on Wednesday s/p fall where workup was unremarkable (CT Head, X-Rays, UA, CBC, Troponin). Over the past three days, decline has continued and she is now slumped on her R side. She does not report pain, no chest pain/pressure, no shortness of breath, no dsyuria.    In ED, VSS with intermittent HTN otherwise HDS. CBC remarkable for leukocytosis of 12.51, BMP wnl, trop negative x2 (24->23), UA negative for infection, and RVP negative. CXR showed possible RLL atelectasis and CTH non con negative for acute hemorrhage or midline shift. In ED patient was given 1L NaCl

## 2024-09-30 DIAGNOSIS — R41.82 ALTERED MENTAL STATUS, UNSPECIFIED: ICD-10-CM

## 2024-09-30 DIAGNOSIS — Z79.899 OTHER LONG TERM (CURRENT) DRUG THERAPY: ICD-10-CM

## 2024-09-30 DIAGNOSIS — Z29.9 ENCOUNTER FOR PROPHYLACTIC MEASURES, UNSPECIFIED: ICD-10-CM

## 2024-09-30 DIAGNOSIS — R29.6 REPEATED FALLS: ICD-10-CM

## 2024-09-30 LAB
FOLATE SERPL-MCNC: >20 NG/ML — SIGNIFICANT CHANGE UP
T4 AB SER-ACNC: 7.4 UG/DL — SIGNIFICANT CHANGE UP (ref 4.6–12)
TSH SERPL-MCNC: 0.85 UIU/ML — SIGNIFICANT CHANGE UP (ref 0.27–4.2)
VIT B12 SERPL-MCNC: 358 PG/ML — SIGNIFICANT CHANGE UP (ref 232–1245)

## 2024-09-30 PROCEDURE — 70551 MRI BRAIN STEM W/O DYE: CPT | Mod: 26

## 2024-09-30 PROCEDURE — 99232 SBSQ HOSP IP/OBS MODERATE 35: CPT

## 2024-09-30 RX ORDER — DONEPEZIL HCL 10 MG
5 TABLET ORAL AT BEDTIME
Refills: 0 | Status: DISCONTINUED | OUTPATIENT
Start: 2024-09-30 | End: 2024-10-14

## 2024-09-30 RX ORDER — MEMANTINE 10 MG/1
5 TABLET ORAL DAILY
Refills: 0 | Status: DISCONTINUED | OUTPATIENT
Start: 2024-09-30 | End: 2024-10-14

## 2024-09-30 RX ORDER — ROSUVASTATIN CALCIUM 20 MG/1
5 TABLET, COATED ORAL AT BEDTIME
Refills: 0 | Status: DISCONTINUED | OUTPATIENT
Start: 2024-09-30 | End: 2024-10-14

## 2024-09-30 RX ORDER — FOLIC ACID 1 MG/1
1 TABLET ORAL DAILY
Refills: 0 | Status: DISCONTINUED | OUTPATIENT
Start: 2024-09-30 | End: 2024-10-14

## 2024-09-30 RX ORDER — HYDRALAZINE HYDROCHLORIDE 100 MG/1
10 TABLET ORAL
Refills: 0 | Status: DISCONTINUED | OUTPATIENT
Start: 2024-09-30 | End: 2024-10-14

## 2024-09-30 RX ORDER — 5-HYDROXYTRYPTOPHAN (5-HTP) 100 MG
3 TABLET,DISINTEGRATING ORAL AT BEDTIME
Refills: 0 | Status: DISCONTINUED | OUTPATIENT
Start: 2024-09-30 | End: 2024-10-14

## 2024-09-30 RX ORDER — ONDANSETRON HCL/PF 4 MG/2 ML
4 VIAL (ML) INJECTION EVERY 8 HOURS
Refills: 0 | Status: DISCONTINUED | OUTPATIENT
Start: 2024-09-30 | End: 2024-10-14

## 2024-09-30 RX ORDER — MAG HYDROX/ALUMINUM HYD/SIMETH 200-200-20
30 SUSPENSION, ORAL (FINAL DOSE FORM) ORAL EVERY 4 HOURS
Refills: 0 | Status: DISCONTINUED | OUTPATIENT
Start: 2024-09-30 | End: 2024-10-14

## 2024-09-30 RX ORDER — ASPIRIN 325 MG
81 TABLET ORAL DAILY
Refills: 0 | Status: DISCONTINUED | OUTPATIENT
Start: 2024-09-30 | End: 2024-10-14

## 2024-09-30 RX ORDER — ACETAMINOPHEN 325 MG
650 TABLET ORAL EVERY 6 HOURS
Refills: 0 | Status: DISCONTINUED | OUTPATIENT
Start: 2024-09-30 | End: 2024-10-14

## 2024-09-30 RX ORDER — PYRIDOXINE HCL (VITAMIN B6) 50 MG
100 TABLET ORAL DAILY
Refills: 0 | Status: DISCONTINUED | OUTPATIENT
Start: 2024-09-30 | End: 2024-10-14

## 2024-09-30 RX ORDER — CETIRIZINE HYDROCHLORIDE 10 MG/1
10 TABLET ORAL DAILY
Refills: 0 | Status: DISCONTINUED | OUTPATIENT
Start: 2024-09-30 | End: 2024-10-14

## 2024-09-30 RX ADMIN — Medication 5000 UNIT(S): at 14:51

## 2024-09-30 RX ADMIN — Medication 5 MILLIGRAM(S): at 22:44

## 2024-09-30 RX ADMIN — ROSUVASTATIN CALCIUM 5 MILLIGRAM(S): 20 TABLET, COATED ORAL at 22:44

## 2024-09-30 RX ADMIN — HYDRALAZINE HYDROCHLORIDE 10 MILLIGRAM(S): 100 TABLET ORAL at 18:58

## 2024-09-30 RX ADMIN — Medication 5000 UNIT(S): at 05:38

## 2024-09-30 RX ADMIN — Medication 5000 UNIT(S): at 22:43

## 2024-09-30 NOTE — PHYSICAL THERAPY INITIAL EVALUATION ADULT - NSPTDISCHREC_GEN_A_CORE
return to SNF will see pt progress over next few sessions if can benefit from PT services at SNF , at present pt not follow commands , lethargy return to SNF will see pt progress over next few sessions if can benefit from PT services at SNF , at present pt not follow commands , lethargy; 10/2/24 pt follow simple commands 75 % of time very tired and cold , pt having cont eeg at present , f/u next session for fxl assess ; [facility] son [facility]

## 2024-09-30 NOTE — PHYSICAL THERAPY INITIAL EVALUATION ADULT - PATIENT/FAMILY AGREES WITH PLAN
came from SNF , return to SNF came from SNF , return to SNF; 10/2/24 correction made per cm pt from correction

## 2024-09-30 NOTE — PHYSICAL THERAPY INITIAL EVALUATION ADULT - PASSIVE RANGE OF MOTION EXAMINATION, REHAB EVAL
PROm shoulders to 80 degrees to intermittent aarom rest wfl's rest of ue's ; hips /knees resistant to move prom to 30 degrees , df./pf wfl's aarom to PROm + toe wiggles arom/bilateral upper extremity Passive ROM was WFL (within functional limits)/bilateral lower extremity Passive ROM was WFL (within functional limits)

## 2024-09-30 NOTE — PHYSICAL THERAPY INITIAL EVALUATION ADULT - ADDITIONAL COMMENTS
per chart per son able to bath and dress self , walk and is A&O x 3 at baseline sharp change in AMS over past week ; pt resides at [facility] per chart per son able to bath and dress self , walk and is A&O x 3 at baseline sharp change in AMS over past week ; pt resides at [facility] per cm note per son ; per pt walks with rolling walker sometimes need assist , assists at times from aides per pt for dressing and bathing

## 2024-09-30 NOTE — PHYSICAL THERAPY INITIAL EVALUATION ADULT - PERTINENT HX OF CURRENT PROBLEM, REHAB EVAL
History of Present Illness:   82 y/o F with PMHx of dementia A&Ox3 at baseline, presenting for AMS of 1 week duration. Patient is A&Ox0 at bedside so all collateral information per chart has been gathered from her son and charts. Per chart As per son, patient resides at [facility] in [place]. Per chart Son reports that in the past week, pt has had a rapid decline in her mental status. She is normally orientated x 3, able to converse and complete all ADLs like cooking, bathing, dressing herself. Over the past 4-5 days, pt has become mroe disoriented, unable to walk, unable to feed herself, and has fallen twice. She presented at [facility] on Wednesday s/p fall where workup was unremarkable (CT Head, X-Rays, UA, CBC, Troponin). Over the past three days, decline has continued and she is now slumped on her R side. She does not report pain, no chest pain/pressure, no shortness of breath, no dsyuria.    In ED, VSS with intermittent HTN otherwise HDS. CBC remarkable for leukocytosis of 12.51, BMP wnl, trop negative x2 (24->23), UA negative for infection, and RVP negative. CXR showed possible RLL atelectasis and CTH non con negative for acute hemorrhage or midline shift. In ED patient was given 1L NaCl

## 2024-09-30 NOTE — PHYSICAL THERAPY INITIAL EVALUATION ADULT - MANUAL MUSCLE TESTING RESULTS, REHAB EVAL
shoulders 2-/5 , rest 2-/5 , hips 2-/5 , knees 2-/5 , df/pf 3-/5 shoulders 2-/5 , rest 2-/5 , hips 2-/5 , knees 2-/5 , df/pf 3-/5; 10/2/24 pt L shoulder 3-/5 , R shoulder 3- to 3/5 ; rest of ue's 3/5 ; hips/knees 2+ to 3-/5 ; feet/ankles 3/5

## 2024-09-30 NOTE — PATIENT PROFILE ADULT - FALL HARM RISK - HARM RISK INTERVENTIONS

## 2024-09-30 NOTE — PHYSICAL THERAPY INITIAL EVALUATION ADULT - ACTIVE RANGE OF MOTION EXAMINATION, REHAB EVAL
10/2/24 aarom L shoulder to 90 degrees , r wfl's rest of ue's wfls arom ; hips/knees wfl's aarom stiffness , feet/ankles arom wfl's/bilateral upper extremity Active ROM was WFL (within functional limits)/bilateral  lower extremity Active ROM was WFL (within functional limits)

## 2024-09-30 NOTE — PHYSICAL THERAPY INITIAL EVALUATION ADULT - MUSCLE TONE ASSESSMENT, REHAB EVAL
increased rigidity when try to move le's and shoulders/rigidity increased rigidity when try to move le's and shoulders; 10/2/24 pt no rigidity stiffness in knees and hips though/rigidity

## 2024-09-30 NOTE — PHYSICAL THERAPY INITIAL EVALUATION ADULT - ORIENTATION, REHAB EVAL
not oriented to person, place, time or situation 10/2/24 orient to person , birhtday , reorient to place and situation/person/not oriented to person, place, time or situation

## 2024-09-30 NOTE — PHYSICAL THERAPY INITIAL EVALUATION ADULT - GENERAL OBSERVATIONS, REHAB EVAL
pt received in bed nad all siderails up HOB 30 degrees call bell,table in reach bed in lowest position bed alarm set middle level , heels elevated off bed and pillow under R buttock for pressure relief , + purewick to wall suction ; pt with tactile stimulation open eyes briefly x3 in session follow mamieand x 1 when asked to wiggle toes rest of time not following ; pt not oriented x 0 , pt tries to mumble words intermittent then back to sleep pt received in bed nad all siderails up HOB 30 degrees call bell,table in reach bed in lowest position bed alarm set middle level , heels elevated off bed and pillow under R buttock for pressure relief , + purewick to wall suction ; pt with tactile stimulation open eyes briefly x3 in session follow coomand x 1 when asked to wiggle toes rest of time not following ; pt not oriented x 0 , pt tries to mumble words intermittent then back to sleep; 10/2/24 pt received in bed nad all siderils up HOB 32 degrees call bell,phone and table in reach bed alarm active bed in lwoest posiiton + mitts B hands , + purewick to wall suction ; pt resting but arousable opens eyes speaking today knew name , birthday yr , reoriented to place ; pt follow simple commands 75 % of time single step + eeg continuous ; pt perform level ii therex in bed aarom ue's and le's x 5 reps each ith rest period in btw each exercise , BP elevated in bed 181 /90 pt offer no cc's except cold and tired pt given addition blanket

## 2024-09-30 NOTE — PHYSICAL THERAPY INITIAL EVALUATION ADULT - FOLLOWS COMMANDS/ANSWERS QUESTIONS, REHAB EVAL
follow command x1 then rest of time not following follow command x1 then rest of time not following; 10/2/24 pt follow simple command 75% of time

## 2024-10-01 LAB
ANION GAP SERPL CALC-SCNC: 13 MMOL/L — SIGNIFICANT CHANGE UP (ref 5–17)
BUN SERPL-MCNC: 26 MG/DL — HIGH (ref 7–23)
CALCIUM SERPL-MCNC: 9.1 MG/DL — SIGNIFICANT CHANGE UP (ref 8.4–10.5)
CHLORIDE SERPL-SCNC: 107 MMOL/L — SIGNIFICANT CHANGE UP (ref 96–108)
CO2 SERPL-SCNC: 22 MMOL/L — SIGNIFICANT CHANGE UP (ref 22–31)
CREAT SERPL-MCNC: 0.88 MG/DL — SIGNIFICANT CHANGE UP (ref 0.5–1.3)
CULTURE RESULTS: SIGNIFICANT CHANGE UP
EGFR: 65 ML/MIN/1.73M2 — SIGNIFICANT CHANGE UP
GLUCOSE BLDC GLUCOMTR-MCNC: 104 MG/DL — HIGH (ref 70–99)
GLUCOSE BLDC GLUCOMTR-MCNC: 86 MG/DL — SIGNIFICANT CHANGE UP (ref 70–99)
GLUCOSE BLDC GLUCOMTR-MCNC: 91 MG/DL — SIGNIFICANT CHANGE UP (ref 70–99)
GLUCOSE SERPL-MCNC: 65 MG/DL — LOW (ref 70–99)
HCT VFR BLD CALC: 36.1 % — SIGNIFICANT CHANGE UP (ref 34.5–45)
HGB BLD-MCNC: 11.2 G/DL — LOW (ref 11.5–15.5)
MAGNESIUM SERPL-MCNC: 1.9 MG/DL — SIGNIFICANT CHANGE UP (ref 1.6–2.6)
MCHC RBC-ENTMCNC: 27.2 PG — SIGNIFICANT CHANGE UP (ref 27–34)
MCHC RBC-ENTMCNC: 31 GM/DL — LOW (ref 32–36)
MCV RBC AUTO: 87.6 FL — SIGNIFICANT CHANGE UP (ref 80–100)
NRBC # BLD: 0 /100 WBCS — SIGNIFICANT CHANGE UP (ref 0–0)
PHOSPHATE SERPL-MCNC: 2.8 MG/DL — SIGNIFICANT CHANGE UP (ref 2.5–4.5)
PLATELET # BLD AUTO: 272 K/UL — SIGNIFICANT CHANGE UP (ref 150–400)
POTASSIUM SERPL-MCNC: 4.3 MMOL/L — SIGNIFICANT CHANGE UP (ref 3.5–5.3)
POTASSIUM SERPL-SCNC: 4.3 MMOL/L — SIGNIFICANT CHANGE UP (ref 3.5–5.3)
RBC # BLD: 4.12 M/UL — SIGNIFICANT CHANGE UP (ref 3.8–5.2)
RBC # FLD: 14.7 % — HIGH (ref 10.3–14.5)
SODIUM SERPL-SCNC: 142 MMOL/L — SIGNIFICANT CHANGE UP (ref 135–145)
SPECIMEN SOURCE: SIGNIFICANT CHANGE UP
T PALLIDUM AB TITR SER: NEGATIVE — SIGNIFICANT CHANGE UP
WBC # BLD: 9.61 K/UL — SIGNIFICANT CHANGE UP (ref 3.8–10.5)
WBC # FLD AUTO: 9.61 K/UL — SIGNIFICANT CHANGE UP (ref 3.8–10.5)

## 2024-10-01 PROCEDURE — 95720 EEG PHY/QHP EA INCR W/VEEG: CPT

## 2024-10-01 PROCEDURE — 99233 SBSQ HOSP IP/OBS HIGH 50: CPT

## 2024-10-01 RX ORDER — CYANOCOBALAMIN (VITAMIN B-12) 1000MCG/ML
1000 VIAL (ML) INJECTION DAILY
Refills: 0 | Status: DISCONTINUED | OUTPATIENT
Start: 2024-10-01 | End: 2024-10-14

## 2024-10-01 RX ADMIN — Medication 5000 UNIT(S): at 13:07

## 2024-10-01 RX ADMIN — Medication 5000 UNIT(S): at 05:30

## 2024-10-01 RX ADMIN — Medication 5000 UNIT(S): at 22:23

## 2024-10-01 RX ADMIN — Medication 81 MILLIGRAM(S): at 13:07

## 2024-10-01 RX ADMIN — Medication 5 MILLIGRAM(S): at 22:24

## 2024-10-01 RX ADMIN — ROSUVASTATIN CALCIUM 5 MILLIGRAM(S): 20 TABLET, COATED ORAL at 22:24

## 2024-10-01 RX ADMIN — FOLIC ACID 1 MILLIGRAM(S): 1 TABLET ORAL at 13:07

## 2024-10-01 RX ADMIN — MEMANTINE 5 MILLIGRAM(S): 10 TABLET ORAL at 13:07

## 2024-10-01 RX ADMIN — CETIRIZINE HYDROCHLORIDE 10 MILLIGRAM(S): 10 TABLET ORAL at 13:06

## 2024-10-01 RX ADMIN — HYDRALAZINE HYDROCHLORIDE 10 MILLIGRAM(S): 100 TABLET ORAL at 17:01

## 2024-10-01 RX ADMIN — Medication 100 MILLIGRAM(S): at 13:07

## 2024-10-01 RX ADMIN — Medication 1000 MICROGRAM(S): at 13:07

## 2024-10-01 RX ADMIN — HYDRALAZINE HYDROCHLORIDE 10 MILLIGRAM(S): 100 TABLET ORAL at 05:30

## 2024-10-01 NOTE — ADVANCED PRACTICE NURSE CONSULT - ASSESSMENT
arrived on unit, patient was found lying in a low air loss pressure redistribution support surface style bed. The patient  is unable to turn independently and staff assistance x 2 was provided. Once turned,  able to view her skin.  Ana rectal area with erythema and indurated consistent with incontinence dermatitis.   incontinence of stool and urine documented in patient chart.  left skin with multiple dry crusted abrasions and  with open area with serosanguinous drainage.  patient with history of fall, prior to hospital admission.   bilateral pannus with moist erythremia Satellight t lesions consistent with  ITD (intertriginous dermatitis)   patient  was educated  on the importance  for turning  and positioning  every 2 hours. The use of waffle cushion when out of bed  to chair,  and to shift her Weight every 2 hours while in chair. The importance a keeping her  skin clean and dry and  to offload feet/heels ,  and optimal nutrition.

## 2024-10-01 NOTE — ADVANCED PRACTICE NURSE CONSULT - RECOMMEDATIONS
Impression   fecal incontinence  urinary incontinence   ally rectal incontinence dermatitis  left shin abrasion   bilateral pannus  ITD (intertriginous dermatitis)    Recommendations   1. left shin abrasion cleans area normal saline pat dry apply Cavilon to open area apply  Aquacel to absorb drainage, cover with gauze secure with Ortiz dressing. change daily and when soiled. monitor for changes.    2. Right and left heel - Elevate heels; apply Complete Cair air fluidized boots; ensure that the soles of the feet are not resting on the foot board of the bed.  3     ally rectal incontinence dermatitis  cleanse w/incontinent cleanser, pat dry & apply Marion  cream  twice daily & prn soiling . Monitor for changes .  4. Maintain on an alternating air with low air loss surface   5. Turn & reposition every 2 hr; Use positioning pillow to turn and reposition, soft pillow between bony prominences; continue measures to decrease friction/shear/pressure.  6. Nutrition optimization.  7.  Place waffle cushion when out of bed to chair .  8. bilateral pannus ITD (intertriginous dermatitis) - cleanse skin and pat dry thoroughly then lay a single sheet of interdry FLAT in between skin folds leaving a 2-4 inch tail open to air (allowing fabric to wick away moisture and air dry)  will not follow , please recall for new issues  plan of care reviewed with covering staff Yana Hicks (RN)

## 2024-10-01 NOTE — ADVANCED PRACTICE NURSE CONSULT - REASON FOR CONSULT
Consult to assess patient skin initiated by RN for left shin wound present on admission.     History of Present Illness:  Reason for Admission: AMS  History of Present Illness:   84 y/o F with PMHx of dementia A&Ox3 at baseline, presenting for AMS of 1 week duration. Patient is A&Ox0 at bedside so all collateral information has been gathered from her son and charts. A per son, patient resides at [facility] in [place]. Son reports that in the past week, pt has had a rapid decline in her mental status. She is normally orientated x 3, able to converse and complete all ADLs like cooking, bathing, dressing herself. Over the past 4-5 days, pt has become mroe disoriented, unable to walk, unable to feed herself, and has fallen twice. She presented at [facility] on Wednesday s/p fall where workup was unremarkable (CT Head, X-Rays, UA, CBC, Troponin). Over the past three days, decline has continued and she is now slumped on her R side. She does not report pain, no chest pain/pressure, no shortness of breath, no dsyuria.    In ED, VSS with intermittent HTN otherwise HDS. CBC remarkable for leukocytosis of 12.51, BMP wnl, trop negative x2 (24->23), UA negative for infection, and RVP negative. CXR showed possible RLL atelectasis and CTH non con negative for acute hemorrhage or midline shift. In ED patient was given 1L NaCl

## 2024-10-01 NOTE — CHART NOTE - NSCHARTNOTEFT_GEN_A_CORE
Initial 50 minutes of record reviewed.  There are no epileptiform abnormalities noted. Background is continuous.     Mild to moderate diffuse cerebral dysfunction that is not specific in etiology    Full report to follow after review of completed study tomorrow.     RENU Lorenzana.  Attending Physician, [facility]      [facility] EEG Reading Room Ph#: [phone]  Epilepsy Answering Service after 5PM and before 8:30AM: Ph#: [phone]

## 2024-10-02 LAB
GLUCOSE BLDC GLUCOMTR-MCNC: 122 MG/DL — HIGH (ref 70–99)
GLUCOSE BLDC GLUCOMTR-MCNC: 151 MG/DL — HIGH (ref 70–99)
GLUCOSE BLDC GLUCOMTR-MCNC: 79 MG/DL — SIGNIFICANT CHANGE UP (ref 70–99)
GLUCOSE BLDC GLUCOMTR-MCNC: 80 MG/DL — SIGNIFICANT CHANGE UP (ref 70–99)

## 2024-10-02 PROCEDURE — 95718 EEG PHYS/QHP 2-12 HR W/VEEG: CPT

## 2024-10-02 PROCEDURE — 99233 SBSQ HOSP IP/OBS HIGH 50: CPT

## 2024-10-02 RX ORDER — INFLUENZA VIRUS VACCINE 15; 15; 15; 15 UG/.5ML; UG/.5ML; UG/.5ML; UG/.5ML
0.5 SUSPENSION INTRAMUSCULAR ONCE
Refills: 0 | Status: DISCONTINUED | OUTPATIENT
Start: 2024-10-02 | End: 2024-10-14

## 2024-10-02 RX ADMIN — Medication 5000 UNIT(S): at 06:13

## 2024-10-02 RX ADMIN — MEMANTINE 5 MILLIGRAM(S): 10 TABLET ORAL at 11:21

## 2024-10-02 RX ADMIN — Medication 81 MILLIGRAM(S): at 11:20

## 2024-10-02 RX ADMIN — Medication 5000 UNIT(S): at 22:03

## 2024-10-02 RX ADMIN — Medication 5000 UNIT(S): at 13:23

## 2024-10-02 RX ADMIN — Medication 100 MILLIGRAM(S): at 11:20

## 2024-10-02 RX ADMIN — FOLIC ACID 1 MILLIGRAM(S): 1 TABLET ORAL at 11:21

## 2024-10-02 RX ADMIN — Medication 5 MILLIGRAM(S): at 21:56

## 2024-10-02 RX ADMIN — Medication 1000 MICROGRAM(S): at 11:21

## 2024-10-02 RX ADMIN — ROSUVASTATIN CALCIUM 5 MILLIGRAM(S): 20 TABLET, COATED ORAL at 21:56

## 2024-10-02 RX ADMIN — CETIRIZINE HYDROCHLORIDE 10 MILLIGRAM(S): 10 TABLET ORAL at 11:21

## 2024-10-02 RX ADMIN — HYDRALAZINE HYDROCHLORIDE 10 MILLIGRAM(S): 100 TABLET ORAL at 06:10

## 2024-10-02 RX ADMIN — HYDRALAZINE HYDROCHLORIDE 10 MILLIGRAM(S): 100 TABLET ORAL at 17:05

## 2024-10-02 NOTE — EEG REPORT - NS EEG TEXT BOX
REPORT OF CONTINUOUS VIDEO EEG      [facility]: [address], [place] [address], Phone: [phone]  [facility]: [address], [place] [address], Phone: [phone]  [facility]: [address] [place] [address], Phone: [phone]    Patient Name: Pratibha Hager   Age: 83 year, : 1940  Grey: -5  W    Study Date: 10/1/2024	Start Time: 11:51:43 AM      End Date:  10/1/2024	End Time: 13:34:23 PM     Study Duration: 1 hr  38 min    Study Information:    EEG Recording Technique:  The patient underwent continuous Video-EEG monitoring, using Telemetry System hardware on the XLTek Digital System. EEG and video data were stored on a computer hard drive with important events saved in digital archive files. The material was reviewed by a physician (electroencephalographer / epileptologist) on a daily basis. Black and seizure detection algorithms were utilized and reviewed. An EEG Technician attended to the patient, and was available throughout daytime work hours.  The epilepsy center neurologist was available in person or on call 24-hours per day.    EEG Placement and Labeling of Electrodes:  The EEG was performed utilizing 20 channel referential EEG connections (coronal over temporal over parasagittal montage) using all standard 10-20 electrode placements with EKG, with additional electrodes placed in the inferior temporal region using the modified 10-10 montage electrode placements for elective admissions, or if deemed necessary. Recording was at a sampling rate of 256 samples per second per channel. Time synchronized digital video recording was done simultaneously with EEG recording. A low light infrared camera was used for low light recording.     History: -  83 year old Female is here to rule out seizures    Medication  Nameda    Tylenol      Interpretation:    [[[Abbreviation Key:  PDR=alpha rhythm/posterior dominant rhythm. A-P=anterior posterior.  Amplitude: ‘very low’:<20; ‘low’:20-49; ‘medium’:; ‘high’:>150uV.  Persistence for periodic/rhythmic patterns (% of epoch) ‘rare’:<1%; ‘occasional’:1-10%; ‘frequent’:10-50%; ‘abundant’:50-90%; ‘continuous’:>90%.  Persistence for sporadic discharges: ‘rare’:<1/hr; ‘occasional’:1/min-1/hr; ‘frequent’:>1/min; ‘abundant’:>1/10 sec.  RPP=rhythmic and periodic patterns; GRDA=generalized rhythmic delta activity; FIRDA=frontal intermittent GRDA; LRDA=lateralized rhythmic delta activity; TIRDA=temporal intermittent rhythmic delta activity;  LPD=PLED=lateralized periodic discharges; GPD=generalized periodic discharges; BIPDs =bilateral independent periodic discharges; Mf=multifocal; SIRPDs=stimulus induced rhythmic, periodic, or ictal appearing discharges; BIRDs=brief potentially ictal rhythmic discharges >4 Hz, lasting .5-10s; PFA (paroxysmal bursts >13 Hz or =8 Hz <10s).  Modifiers: +F=with fast component; +S=with spike component; +R=with rhythmic component.  S-B=burst suppression pattern.  Max=maximal. N1-drowsy; N2-stage II sleep; N3-slow wave sleep. SSS/BETS=small sharp spikes/benign epileptiform transients of sleep. HV=hyperventilation; PS=photic stimulation]]]    Daily EEG Visual Analysis    FINDINGS:      Background:  Symmetry: symmetric  Continuous: continuous  PDR: symmetric, poorly-modulated 6-7 Hz activity, with amplitude to 40 uV, that attenuated to eye opening.  Low amplitude frontal beta noted in wakefulness.  Reactivity: present  Voltage: normal, [defined typically between 20-150uV]  Anterior Posterior Gradient: absent  Breach: absent    Background Slowing:  Generalized slowing: present. Background is diffusely slow consisting of polymorphic delta theta activity up to 6-7 Hz    Focal slowing: none was present.    State Changes:   Drowsiness was characterized by fragmentation, attenuation, and slowing of the background activity.    N2 sleep transients are not recorded.      Sporadic Epileptiform Discharges:   None    Rhythmic and Periodic Patterns (RPPs):  None     Electrographic and Electroclinical seizures:  None    Other Clinical Events:  None    Activation Procedures:   -Hyperventilation was not performed.    -Photic stimulation was not performed.      Artifacts:  Intermittent myogenic and movement artifacts were noted.    ECG:  No significant abnormality    ________________________________________  EEG Summary / Classification:    Abnormal EEG in awake / drowsy .  1.	Background slowing including PDR < 8 Hz      ________________________________________  EEG Impression / Clinical Correlate:  Abnormal prolonged EEG study due to Mild to moderate diffuse cerebral dysfunction that is not specific in etiology    No epileptic discharges recorded.  No seizures recorded.    ________________________________________    RENU Lorenzana  Attending Physician, [facility]    ------------------------------------  EEG Reading Room: [phone]  On Call Service After Hours: [phone]

## 2024-10-03 LAB
GLUCOSE BLDC GLUCOMTR-MCNC: 113 MG/DL — HIGH (ref 70–99)
GLUCOSE BLDC GLUCOMTR-MCNC: 115 MG/DL — HIGH (ref 70–99)
GLUCOSE BLDC GLUCOMTR-MCNC: 145 MG/DL — HIGH (ref 70–99)
GLUCOSE BLDC GLUCOMTR-MCNC: 79 MG/DL — SIGNIFICANT CHANGE UP (ref 70–99)
GLUCOSE BLDC GLUCOMTR-MCNC: 87 MG/DL — SIGNIFICANT CHANGE UP (ref 70–99)

## 2024-10-03 PROCEDURE — 99233 SBSQ HOSP IP/OBS HIGH 50: CPT

## 2024-10-03 RX ORDER — CYANOCOBALAMIN (VITAMIN B-12) 1000MCG/ML
1000 VIAL (ML) INJECTION ONCE
Refills: 0 | Status: COMPLETED | OUTPATIENT
Start: 2024-10-03 | End: 2024-10-03

## 2024-10-03 RX ORDER — SODIUM CHLORIDE IRRIG SOLUTION 0.9 %
1000 SOLUTION, IRRIGATION IRRIGATION
Refills: 0 | Status: DISCONTINUED | OUTPATIENT
Start: 2024-10-03 | End: 2024-10-05

## 2024-10-03 RX ORDER — PETROLATUM,WHITE
1 JELLY (GRAM) TOPICAL DAILY
Refills: 0 | Status: DISCONTINUED | OUTPATIENT
Start: 2024-10-03 | End: 2024-10-14

## 2024-10-03 RX ADMIN — Medication 81 MILLIGRAM(S): at 11:09

## 2024-10-03 RX ADMIN — Medication 650 MILLIGRAM(S): at 21:20

## 2024-10-03 RX ADMIN — HYDRALAZINE HYDROCHLORIDE 10 MILLIGRAM(S): 100 TABLET ORAL at 17:54

## 2024-10-03 RX ADMIN — Medication 5 MILLIGRAM(S): at 21:21

## 2024-10-03 RX ADMIN — Medication 100 MILLIGRAM(S): at 11:08

## 2024-10-03 RX ADMIN — Medication 5000 UNIT(S): at 21:21

## 2024-10-03 RX ADMIN — Medication 75 MILLILITER(S): at 21:20

## 2024-10-03 RX ADMIN — Medication 3 MILLIGRAM(S): at 21:21

## 2024-10-03 RX ADMIN — Medication 1000 MICROGRAM(S): at 10:33

## 2024-10-03 RX ADMIN — FOLIC ACID 1 MILLIGRAM(S): 1 TABLET ORAL at 11:09

## 2024-10-03 RX ADMIN — HYDRALAZINE HYDROCHLORIDE 10 MILLIGRAM(S): 100 TABLET ORAL at 06:06

## 2024-10-03 RX ADMIN — CETIRIZINE HYDROCHLORIDE 10 MILLIGRAM(S): 10 TABLET ORAL at 11:10

## 2024-10-03 RX ADMIN — Medication 5000 UNIT(S): at 06:00

## 2024-10-03 RX ADMIN — Medication 5000 UNIT(S): at 14:18

## 2024-10-03 RX ADMIN — ROSUVASTATIN CALCIUM 5 MILLIGRAM(S): 20 TABLET, COATED ORAL at 21:21

## 2024-10-03 RX ADMIN — Medication 1000 MICROGRAM(S): at 11:08

## 2024-10-03 RX ADMIN — MEMANTINE 5 MILLIGRAM(S): 10 TABLET ORAL at 11:08

## 2024-10-03 NOTE — CHART NOTE - NSCHARTNOTEFT_GEN_A_CORE
- case reviewed and scheduled for a diagnostic LP under anesthesia tomorrow 10/4/24 at 1pm  - STAT labs in AM (cbc, coags)  - NPO at midnight  - please hold subQ heparin dose 6 hours prior to spinal tap   - please order csf studies prior to procedure  - d/w primary team NP

## 2024-10-03 NOTE — EEG REPORT - NS EEG TEXT BOX
REPORT OF CONTINUOUS VIDEO EEG      [facility]: [address], [place] [address], Phone: [phone]  [facility]: [address] [facility], [place] [address], Phone: [phone]  [facility]: [address] [place] [address], Phone: [phone]    Patient Name: Pratibha Hager   Age: 83 year, : 1940  Grey: -5  W    Study Date: 10/1/2024	Start Time: 1:41     End Date:  10/1/2024	End Time: 08:00    Study Duration: 6 hr  7 min    Study Information:    EEG Recording Technique:  The patient underwent continuous Video-EEG monitoring, using Telemetry System hardware on the XLTek Digital System. EEG and video data were stored on a computer hard drive with important events saved in digital archive files. The material was reviewed by a physician (electroencephalographer / epileptologist) on a daily basis. Black and seizure detection algorithms were utilized and reviewed. An EEG Technician attended to the patient, and was available throughout daytime work hours.  The epilepsy center neurologist was available in person or on call 24-hours per day.    EEG Placement and Labeling of Electrodes:  The EEG was performed utilizing 20 channel referential EEG connections (coronal over temporal over parasagittal montage) using all standard 10-20 electrode placements with EKG, with additional electrodes placed in the inferior temporal region using the modified 10-10 montage electrode placements for elective admissions, or if deemed necessary. Recording was at a sampling rate of 256 samples per second per channel. Time synchronized digital video recording was done simultaneously with EEG recording. A low light infrared camera was used for low light recording.     History: -  83 year old Female is here to rule out seizures    Medication  Nameda    Tylenol      Interpretation:    [[[Abbreviation Key:  PDR=alpha rhythm/posterior dominant rhythm. A-P=anterior posterior.  Amplitude: ‘very low’:<20; ‘low’:20-49; ‘medium’:; ‘high’:>150uV.  Persistence for periodic/rhythmic patterns (% of epoch) ‘rare’:<1%; ‘occasional’:1-10%; ‘frequent’:10-50%; ‘abundant’:50-90%; ‘continuous’:>90%.  Persistence for sporadic discharges: ‘rare’:<1/hr; ‘occasional’:1/min-1/hr; ‘frequent’:>1/min; ‘abundant’:>1/10 sec.  RPP=rhythmic and periodic patterns; GRDA=generalized rhythmic delta activity; FIRDA=frontal intermittent GRDA; LRDA=lateralized rhythmic delta activity; TIRDA=temporal intermittent rhythmic delta activity;  LPD=PLED=lateralized periodic discharges; GPD=generalized periodic discharges; BIPDs =bilateral independent periodic discharges; Mf=multifocal; SIRPDs=stimulus induced rhythmic, periodic, or ictal appearing discharges; BIRDs=brief potentially ictal rhythmic discharges >4 Hz, lasting .5-10s; PFA (paroxysmal bursts >13 Hz or =8 Hz <10s).  Modifiers: +F=with fast component; +S=with spike component; +R=with rhythmic component.  S-B=burst suppression pattern.  Max=maximal. N1-drowsy; N2-stage II sleep; N3-slow wave sleep. SSS/BETS=small sharp spikes/benign epileptiform transients of sleep. HV=hyperventilation; PS=photic stimulation]]]    Daily EEG Visual Analysis    FINDINGS:      Background:  Symmetry: symmetric  Continuous: continuous  PDR: symmetric, poorly-modulated 6-7 Hz activity, with amplitude to 40 uV, that attenuated to eye opening.  Low amplitude frontal beta noted in wakefulness.  Reactivity: present  Voltage: normal, [defined typically between 20-150uV]  Anterior Posterior Gradient: absent  Breach: absent    Background Slowing:  Generalized slowing: present. Background is diffusely slow consisting of polymorphic delta theta activity up to 6-7 Hz    Focal slowing: none was present.    State Changes:   Drowsiness was characterized by fragmentation, attenuation, and slowing of the background activity.    N2 sleep transients are not recorded.      Sporadic Epileptiform Discharges:   None    Rhythmic and Periodic Patterns (RPPs):  None     Electrographic and Electroclinical seizures:  None    Other Clinical Events:  None    Activation Procedures:   -Hyperventilation was not performed.    -Photic stimulation was not performed.      Artifacts:  Intermittent myogenic and movement artifacts were noted.    ECG:  No significant abnormality    ________________________________________  EEG Summary / Classification:    Abnormal EEG in awake / drowsy .  1.	Background slowing including PDR < 8 Hz      ________________________________________  EEG Impression / Clinical Correlate:  Abnormal prolonged EEG study due to Mild to moderate diffuse cerebral dysfunction that is not specific in etiology    No epileptic discharges recorded.  No seizures recorded.    ________________________________________    RENU Lorenzana  Attending Physician, [facility]    ------------------------------------  EEG Reading Room: [phone]  On Call Service After Hours: [phone]                      REPORT OF CONTINUOUS VIDEO EEG      [facility]: [address], [place] [address], Phone: [phone]  [facility]: [address], [place] [address], Phone: [phone]  [facility]: [address] [place] [address], Phone: [phone]    Patient Name: Pratibha Hager   Age: 83 year, : 1940  Grey: -5  W    Study Date: 10/1/2024	Start Time: 08:00    End Date:  10/1/2024	End Time: 08:00. EEG is off from 22:59-1:40   Study Duration: 24 hr     Study Information:    EEG Recording Technique:  The patient underwent continuous Video-EEG monitoring, using Telemetry System hardware on the XLTek Digital System. EEG and video data were stored on a computer hard drive with important events saved in digital archive files. The material was reviewed by a physician (electroencephalographer / epileptologist) on a daily basis. Black and seizure detection algorithms were utilized and reviewed. An EEG Technician attended to the patient, and was available throughout daytime work hours.  The epilepsy center neurologist was available in person or on call 24-hours per day.    EEG Placement and Labeling of Electrodes:  The EEG was performed utilizing 20 channel referential EEG connections (coronal over temporal over parasagittal montage) using all standard 10-20 electrode placements with EKG, with additional electrodes placed in the inferior temporal region using the modified 10-10 montage electrode placements for elective admissions, or if deemed necessary. Recording was at a sampling rate of 256 samples per second per channel. Time synchronized digital video recording was done simultaneously with EEG recording. A low light infrared camera was used for low light recording.     History: -  83 year old Female is here to rule out seizures    Medication  Nameda    Tylenol      Interpretation:    [[[Abbreviation Key:  PDR=alpha rhythm/posterior dominant rhythm. A-P=anterior posterior.  Amplitude: ‘very low’:<20; ‘low’:20-49; ‘medium’:; ‘high’:>150uV.  Persistence for periodic/rhythmic patterns (% of epoch) ‘rare’:<1%; ‘occasional’:1-10%; ‘frequent’:10-50%; ‘abundant’:50-90%; ‘continuous’:>90%.  Persistence for sporadic discharges: ‘rare’:<1/hr; ‘occasional’:1/min-1/hr; ‘frequent’:>1/min; ‘abundant’:>1/10 sec.  RPP=rhythmic and periodic patterns; GRDA=generalized rhythmic delta activity; FIRDA=frontal intermittent GRDA; LRDA=lateralized rhythmic delta activity; TIRDA=temporal intermittent rhythmic delta activity;  LPD=PLED=lateralized periodic discharges; GPD=generalized periodic discharges; BIPDs =bilateral independent periodic discharges; Mf=multifocal; SIRPDs=stimulus induced rhythmic, periodic, or ictal appearing discharges; BIRDs=brief potentially ictal rhythmic discharges >4 Hz, lasting .5-10s; PFA (paroxysmal bursts >13 Hz or =8 Hz <10s).  Modifiers: +F=with fast component; +S=with spike component; +R=with rhythmic component.  S-B=burst suppression pattern.  Max=maximal. N1-drowsy; N2-stage II sleep; N3-slow wave sleep. SSS/BETS=small sharp spikes/benign epileptiform transients of sleep. HV=hyperventilation; PS=photic stimulation]]]    Daily EEG Visual Analysis    FINDINGS:      Background:  Symmetry: symmetric  Continuous: continuous  PDR: symmetric, poorly-modulated 6-7 Hz activity, with amplitude to 40 uV, that attenuated to eye opening.  Low amplitude frontal beta noted in wakefulness.  Reactivity: present  Voltage: normal, [defined typically between 20-150uV]  Anterior Posterior Gradient: absent  Breach: absent    Background Slowing:  Generalized slowing: present. Background is diffusely slow consisting of polymorphic delta theta activity up to 6-7 Hz    Focal slowing: none was present.    State Changes:   Drowsiness was characterized by fragmentation, attenuation, and slowing of the background activity.    N2 sleep transients are not recorded.      Sporadic Epileptiform Discharges:   None    Rhythmic and Periodic Patterns (RPPs):  None     Electrographic and Electroclinical seizures:  None    Other Clinical Events:  None    Activation Procedures:   -Hyperventilation was not performed.    -Photic stimulation was not performed.      Artifacts:  Intermittent myogenic and movement artifacts were noted.    ECG:  No significant abnormality    ________________________________________  EEG Summary / Classification:    Abnormal EEG in awake / drowsy .  1.	Background slowing including PDR < 8 Hz      ________________________________________  EEG Impression / Clinical Correlate:  Abnormal prolonged EEG study due to Mild to moderate diffuse cerebral dysfunction that is not specific in etiology    No epileptic discharges recorded.  No seizures recorded.    ________________________________________    RENU Lorenzana  Attending Physician, [facility]    ------------------------------------  EEG Reading Room: [phone]  On Call Service After Hours: [phone]                      REPORT OF CONTINUOUS VIDEO EEG      [facility]: [address], [place] [address], Phone: [phone]  [facility]: [address], [place] [address], Phone: [phone]  [facility]: [address] [place] [address], Phone: [phone]    Patient Name: Pratibha Hager   Age: 83 year, : 1940  Grey: -5  W    Study Date: 10/1/2024	Start Time: 08:00    End Date:  10/1/2024	End Time: 12:51. EEG is off from 22:59-1:40   Study Duration: 28 hr 51    Study Information:    EEG Recording Technique:  The patient underwent continuous Video-EEG monitoring, using Telemetry System hardware on the XLTek Digital System. EEG and video data were stored on a computer hard drive with important events saved in digital archive files. The material was reviewed by a physician (electroencephalographer / epileptologist) on a daily basis. Black and seizure detection algorithms were utilized and reviewed. An EEG Technician attended to the patient, and was available throughout daytime work hours.  The epilepsy center neurologist was available in person or on call 24-hours per day.    EEG Placement and Labeling of Electrodes:  The EEG was performed utilizing 20 channel referential EEG connections (coronal over temporal over parasagittal montage) using all standard 10-20 electrode placements with EKG, with additional electrodes placed in the inferior temporal region using the modified 10-10 montage electrode placements for elective admissions, or if deemed necessary. Recording was at a sampling rate of 256 samples per second per channel. Time synchronized digital video recording was done simultaneously with EEG recording. A low light infrared camera was used for low light recording.     History: -  83 year old Female is here to rule out seizures    Medication  Nameda    Tylenol      Interpretation:    [[[Abbreviation Key:  PDR=alpha rhythm/posterior dominant rhythm. A-P=anterior posterior.  Amplitude: ‘very low’:<20; ‘low’:20-49; ‘medium’:; ‘high’:>150uV.  Persistence for periodic/rhythmic patterns (% of epoch) ‘rare’:<1%; ‘occasional’:1-10%; ‘frequent’:10-50%; ‘abundant’:50-90%; ‘continuous’:>90%.  Persistence for sporadic discharges: ‘rare’:<1/hr; ‘occasional’:1/min-1/hr; ‘frequent’:>1/min; ‘abundant’:>1/10 sec.  RPP=rhythmic and periodic patterns; GRDA=generalized rhythmic delta activity; FIRDA=frontal intermittent GRDA; LRDA=lateralized rhythmic delta activity; TIRDA=temporal intermittent rhythmic delta activity;  LPD=PLED=lateralized periodic discharges; GPD=generalized periodic discharges; BIPDs =bilateral independent periodic discharges; Mf=multifocal; SIRPDs=stimulus induced rhythmic, periodic, or ictal appearing discharges; BIRDs=brief potentially ictal rhythmic discharges >4 Hz, lasting .5-10s; PFA (paroxysmal bursts >13 Hz or =8 Hz <10s).  Modifiers: +F=with fast component; +S=with spike component; +R=with rhythmic component.  S-B=burst suppression pattern.  Max=maximal. N1-drowsy; N2-stage II sleep; N3-slow wave sleep. SSS/BETS=small sharp spikes/benign epileptiform transients of sleep. HV=hyperventilation; PS=photic stimulation]]]    Daily EEG Visual Analysis    FINDINGS:      Background:  Symmetry: symmetric  Continuous: continuous  PDR: symmetric, poorly-modulated 6-7 Hz activity, with amplitude to 40 uV, that attenuated to eye opening.  Low amplitude frontal beta noted in wakefulness.  Reactivity: present  Voltage: normal, [defined typically between 20-150uV]  Anterior Posterior Gradient: absent  Breach: absent    Background Slowing:  Generalized slowing: present. Background is diffusely slow consisting of polymorphic delta theta activity up to 6-7 Hz    Focal slowing: none was present.    State Changes:   Drowsiness was characterized by fragmentation, attenuation, and slowing of the background activity.    N2 sleep transients are not recorded.      Sporadic Epileptiform Discharges:   None    Rhythmic and Periodic Patterns (RPPs):  None     Electrographic and Electroclinical seizures:  None    Other Clinical Events:  None    Activation Procedures:   -Hyperventilation was not performed.    -Photic stimulation was not performed.      Artifacts:  Intermittent myogenic and movement artifacts were noted.    ECG:  No significant abnormality    ________________________________________  EEG Summary / Classification:    Abnormal EEG in awake / drowsy .  1.	Background slowing including PDR < 8 Hz      ________________________________________  EEG Impression / Clinical Correlate:  Abnormal prolonged EEG study due to Mild to moderate diffuse cerebral dysfunction that is not specific in etiology    No epileptic discharges recorded.  No seizures recorded.    ________________________________________    RENU Lorenzana  Attending Physician, [facility]    ------------------------------------  EEG Reading Room: [phone]  On Call Service After Hours: [phone]                      REPORT OF CONTINUOUS VIDEO EEG      [facility]: [address], [place] [address], Phone: [phone]  [facility]: [address], [place] [address], Phone: [phone]  [facility]: [address] [place] [address], Phone: [phone]    Patient Name: Pratibha Hager   Age: 83 year, : 1940  Grey: -5  W    Study Date: 10/1/2024	Start Time: 08:00    End Date:  10/2/2024	End Time: 12:51. EEG is off from 22:59-1:40   Study Duration: 28 hr 51    Study Information:    EEG Recording Technique:  The patient underwent continuous Video-EEG monitoring, using Telemetry System hardware on the XLTek Digital System. EEG and video data were stored on a computer hard drive with important events saved in digital archive files. The material was reviewed by a physician (electroencephalographer / epileptologist) on a daily basis. Black and seizure detection algorithms were utilized and reviewed. An EEG Technician attended to the patient, and was available throughout daytime work hours.  The epilepsy center neurologist was available in person or on call 24-hours per day.    EEG Placement and Labeling of Electrodes:  The EEG was performed utilizing 20 channel referential EEG connections (coronal over temporal over parasagittal montage) using all standard 10-20 electrode placements with EKG, with additional electrodes placed in the inferior temporal region using the modified 10-10 montage electrode placements for elective admissions, or if deemed necessary. Recording was at a sampling rate of 256 samples per second per channel. Time synchronized digital video recording was done simultaneously with EEG recording. A low light infrared camera was used for low light recording.     History: -  83 year old Female is here to rule out seizures    Medication  Nameda    Tylenol      Interpretation:    [[[Abbreviation Key:  PDR=alpha rhythm/posterior dominant rhythm. A-P=anterior posterior.  Amplitude: ‘very low’:<20; ‘low’:20-49; ‘medium’:; ‘high’:>150uV.  Persistence for periodic/rhythmic patterns (% of epoch) ‘rare’:<1%; ‘occasional’:1-10%; ‘frequent’:10-50%; ‘abundant’:50-90%; ‘continuous’:>90%.  Persistence for sporadic discharges: ‘rare’:<1/hr; ‘occasional’:1/min-1/hr; ‘frequent’:>1/min; ‘abundant’:>1/10 sec.  RPP=rhythmic and periodic patterns; GRDA=generalized rhythmic delta activity; FIRDA=frontal intermittent GRDA; LRDA=lateralized rhythmic delta activity; TIRDA=temporal intermittent rhythmic delta activity;  LPD=PLED=lateralized periodic discharges; GPD=generalized periodic discharges; BIPDs =bilateral independent periodic discharges; Mf=multifocal; SIRPDs=stimulus induced rhythmic, periodic, or ictal appearing discharges; BIRDs=brief potentially ictal rhythmic discharges >4 Hz, lasting .5-10s; PFA (paroxysmal bursts >13 Hz or =8 Hz <10s).  Modifiers: +F=with fast component; +S=with spike component; +R=with rhythmic component.  S-B=burst suppression pattern.  Max=maximal. N1-drowsy; N2-stage II sleep; N3-slow wave sleep. SSS/BETS=small sharp spikes/benign epileptiform transients of sleep. HV=hyperventilation; PS=photic stimulation]]]    Daily EEG Visual Analysis    FINDINGS:      Background:  Symmetry: symmetric  Continuous: continuous  PDR: symmetric, poorly-modulated 6-7 Hz activity, with amplitude to 40 uV, that attenuated to eye opening.  Low amplitude frontal beta noted in wakefulness.  Reactivity: present  Voltage: normal, [defined typically between 20-150uV]  Anterior Posterior Gradient: absent  Breach: absent    Background Slowing:  Generalized slowing: present. Background is diffusely slow consisting of polymorphic delta theta activity up to 6-7 Hz    Focal slowing: none was present.    State Changes:   Drowsiness was characterized by fragmentation, attenuation, and slowing of the background activity.    N2 sleep transients are not recorded.      Sporadic Epileptiform Discharges:   None    Rhythmic and Periodic Patterns (RPPs):  None     Electrographic and Electroclinical seizures:  None    Other Clinical Events:  None    Activation Procedures:   -Hyperventilation was not performed.    -Photic stimulation was not performed.      Artifacts:  Intermittent myogenic and movement artifacts were noted.    ECG:  No significant abnormality    ________________________________________  EEG Summary / Classification:    Abnormal EEG in awake / drowsy .  1.	Background slowing including PDR < 8 Hz      ________________________________________  EEG Impression / Clinical Correlate:  Abnormal prolonged EEG study due to Mild to moderate diffuse cerebral dysfunction that is not specific in etiology    No epileptic discharges recorded.  No seizures recorded.    ________________________________________    RENU Lorenzana  Attending Physician, [facility]    ------------------------------------  EEG Reading Room: [phone]  On Call Service After Hours: [phone]

## 2024-10-04 LAB
ALBUMIN SERPL ELPH-MCNC: 3 G/DL — LOW (ref 3.3–5)
ALP SERPL-CCNC: 70 U/L — SIGNIFICANT CHANGE UP (ref 40–120)
ALT FLD-CCNC: 18 U/L — SIGNIFICANT CHANGE UP (ref 10–45)
ANION GAP SERPL CALC-SCNC: 14 MMOL/L — SIGNIFICANT CHANGE UP (ref 5–17)
APTT BLD: 97.9 SEC — HIGH (ref 24.5–35.6)
AST SERPL-CCNC: 36 U/L — SIGNIFICANT CHANGE UP (ref 10–40)
BASOPHILS # BLD AUTO: 0.04 K/UL — SIGNIFICANT CHANGE UP (ref 0–0.2)
BASOPHILS NFR BLD AUTO: 0.5 % — SIGNIFICANT CHANGE UP (ref 0–2)
BILIRUB SERPL-MCNC: 0.6 MG/DL — SIGNIFICANT CHANGE UP (ref 0.2–1.2)
BUN SERPL-MCNC: 24 MG/DL — HIGH (ref 7–23)
CALCIUM SERPL-MCNC: 8.8 MG/DL — SIGNIFICANT CHANGE UP (ref 8.4–10.5)
CHLORIDE SERPL-SCNC: 107 MMOL/L — SIGNIFICANT CHANGE UP (ref 96–108)
CO2 SERPL-SCNC: 20 MMOL/L — LOW (ref 22–31)
CREAT SERPL-MCNC: 0.91 MG/DL — SIGNIFICANT CHANGE UP (ref 0.5–1.3)
EGFR: 62 ML/MIN/1.73M2 — SIGNIFICANT CHANGE UP
EOSINOPHIL # BLD AUTO: 0.45 K/UL — SIGNIFICANT CHANGE UP (ref 0–0.5)
EOSINOPHIL NFR BLD AUTO: 5.4 % — SIGNIFICANT CHANGE UP (ref 0–6)
GLUCOSE BLDC GLUCOMTR-MCNC: 104 MG/DL — HIGH (ref 70–99)
GLUCOSE BLDC GLUCOMTR-MCNC: 95 MG/DL — SIGNIFICANT CHANGE UP (ref 70–99)
GLUCOSE BLDC GLUCOMTR-MCNC: 99 MG/DL — SIGNIFICANT CHANGE UP (ref 70–99)
GLUCOSE BLDC GLUCOMTR-MCNC: 99 MG/DL — SIGNIFICANT CHANGE UP (ref 70–99)
GLUCOSE SERPL-MCNC: 86 MG/DL — SIGNIFICANT CHANGE UP (ref 70–99)
HCT VFR BLD CALC: 39.7 % — SIGNIFICANT CHANGE UP (ref 34.5–45)
HGB BLD-MCNC: 12.4 G/DL — SIGNIFICANT CHANGE UP (ref 11.5–15.5)
IMM GRANULOCYTES NFR BLD AUTO: 0.8 % — SIGNIFICANT CHANGE UP (ref 0–0.9)
INR BLD: 2.14 RATIO — HIGH (ref 0.85–1.16)
LYMPHOCYTES # BLD AUTO: 1.28 K/UL — SIGNIFICANT CHANGE UP (ref 1–3.3)
LYMPHOCYTES # BLD AUTO: 15.4 % — SIGNIFICANT CHANGE UP (ref 13–44)
MCHC RBC-ENTMCNC: 27.3 PG — SIGNIFICANT CHANGE UP (ref 27–34)
MCHC RBC-ENTMCNC: 31.2 GM/DL — LOW (ref 32–36)
MCV RBC AUTO: 87.4 FL — SIGNIFICANT CHANGE UP (ref 80–100)
MONOCYTES # BLD AUTO: 0.73 K/UL — SIGNIFICANT CHANGE UP (ref 0–0.9)
MONOCYTES NFR BLD AUTO: 8.8 % — SIGNIFICANT CHANGE UP (ref 2–14)
NEUTROPHILS # BLD AUTO: 5.76 K/UL — SIGNIFICANT CHANGE UP (ref 1.8–7.4)
NEUTROPHILS NFR BLD AUTO: 69.1 % — SIGNIFICANT CHANGE UP (ref 43–77)
NRBC # BLD: 0 /100 WBCS — SIGNIFICANT CHANGE UP (ref 0–0)
PLATELET # BLD AUTO: 336 K/UL — SIGNIFICANT CHANGE UP (ref 150–400)
POTASSIUM SERPL-MCNC: 4.7 MMOL/L — SIGNIFICANT CHANGE UP (ref 3.5–5.3)
POTASSIUM SERPL-SCNC: 4.7 MMOL/L — SIGNIFICANT CHANGE UP (ref 3.5–5.3)
PROT SERPL-MCNC: 6.5 G/DL — SIGNIFICANT CHANGE UP (ref 6–8.3)
PROTHROM AB SERPL-ACNC: 24.4 SEC — HIGH (ref 9.9–13.4)
RBC # BLD: 4.54 M/UL — SIGNIFICANT CHANGE UP (ref 3.8–5.2)
RBC # FLD: 14.6 % — HIGH (ref 10.3–14.5)
SODIUM SERPL-SCNC: 141 MMOL/L — SIGNIFICANT CHANGE UP (ref 135–145)
WBC # BLD: 8.33 K/UL — SIGNIFICANT CHANGE UP (ref 3.8–10.5)
WBC # FLD AUTO: 8.33 K/UL — SIGNIFICANT CHANGE UP (ref 3.8–10.5)

## 2024-10-04 PROCEDURE — 99233 SBSQ HOSP IP/OBS HIGH 50: CPT

## 2024-10-04 RX ORDER — PHYTONADIONE (VIT K1)
5 CRYSTALS MISCELLANEOUS ONCE
Refills: 0 | Status: COMPLETED | OUTPATIENT
Start: 2024-10-04 | End: 2024-10-04

## 2024-10-04 RX ADMIN — Medication 3 MILLIGRAM(S): at 21:17

## 2024-10-04 RX ADMIN — Medication 100 MILLIGRAM(S): at 14:53

## 2024-10-04 RX ADMIN — HYDRALAZINE HYDROCHLORIDE 10 MILLIGRAM(S): 100 TABLET ORAL at 17:25

## 2024-10-04 RX ADMIN — CETIRIZINE HYDROCHLORIDE 10 MILLIGRAM(S): 10 TABLET ORAL at 14:53

## 2024-10-04 RX ADMIN — FOLIC ACID 1 MILLIGRAM(S): 1 TABLET ORAL at 14:53

## 2024-10-04 RX ADMIN — HYDRALAZINE HYDROCHLORIDE 10 MILLIGRAM(S): 100 TABLET ORAL at 06:07

## 2024-10-04 RX ADMIN — Medication 1 APPLICATION(S): at 14:53

## 2024-10-04 RX ADMIN — Medication 5 MILLIGRAM(S): at 21:17

## 2024-10-04 RX ADMIN — Medication 81 MILLIGRAM(S): at 14:54

## 2024-10-04 RX ADMIN — ROSUVASTATIN CALCIUM 5 MILLIGRAM(S): 20 TABLET, COATED ORAL at 21:17

## 2024-10-04 RX ADMIN — Medication 5 MILLIGRAM(S): at 15:04

## 2024-10-04 RX ADMIN — Medication 1000 MICROGRAM(S): at 14:53

## 2024-10-04 RX ADMIN — MEMANTINE 5 MILLIGRAM(S): 10 TABLET ORAL at 14:53

## 2024-10-05 LAB
ALBUMIN SERPL ELPH-MCNC: 3.1 G/DL — LOW (ref 3.3–5)
ALP SERPL-CCNC: 79 U/L — SIGNIFICANT CHANGE UP (ref 40–120)
ALT FLD-CCNC: 21 U/L — SIGNIFICANT CHANGE UP (ref 10–45)
ANION GAP SERPL CALC-SCNC: 11 MMOL/L — SIGNIFICANT CHANGE UP (ref 5–17)
APTT BLD: 34 SEC — SIGNIFICANT CHANGE UP (ref 24.5–35.6)
AST SERPL-CCNC: 36 U/L — SIGNIFICANT CHANGE UP (ref 10–40)
BASOPHILS # BLD AUTO: 0.05 K/UL — SIGNIFICANT CHANGE UP (ref 0–0.2)
BASOPHILS NFR BLD AUTO: 0.6 % — SIGNIFICANT CHANGE UP (ref 0–2)
BILIRUB SERPL-MCNC: 0.6 MG/DL — SIGNIFICANT CHANGE UP (ref 0.2–1.2)
BUN SERPL-MCNC: 19 MG/DL — SIGNIFICANT CHANGE UP (ref 7–23)
CALCIUM SERPL-MCNC: 9.3 MG/DL — SIGNIFICANT CHANGE UP (ref 8.4–10.5)
CHLORIDE SERPL-SCNC: 105 MMOL/L — SIGNIFICANT CHANGE UP (ref 96–108)
CO2 SERPL-SCNC: 25 MMOL/L — SIGNIFICANT CHANGE UP (ref 22–31)
CREAT SERPL-MCNC: 0.86 MG/DL — SIGNIFICANT CHANGE UP (ref 0.5–1.3)
EGFR: 67 ML/MIN/1.73M2 — SIGNIFICANT CHANGE UP
EOSINOPHIL # BLD AUTO: 0.32 K/UL — SIGNIFICANT CHANGE UP (ref 0–0.5)
EOSINOPHIL NFR BLD AUTO: 3.9 % — SIGNIFICANT CHANGE UP (ref 0–6)
GLUCOSE SERPL-MCNC: 111 MG/DL — HIGH (ref 70–99)
HCT VFR BLD CALC: 40.4 % — SIGNIFICANT CHANGE UP (ref 34.5–45)
HGB BLD-MCNC: 12.5 G/DL — SIGNIFICANT CHANGE UP (ref 11.5–15.5)
IMM GRANULOCYTES NFR BLD AUTO: 0.7 % — SIGNIFICANT CHANGE UP (ref 0–0.9)
INR BLD: 1.21 RATIO — HIGH (ref 0.85–1.16)
LYMPHOCYTES # BLD AUTO: 1.34 K/UL — SIGNIFICANT CHANGE UP (ref 1–3.3)
LYMPHOCYTES # BLD AUTO: 16.2 % — SIGNIFICANT CHANGE UP (ref 13–44)
MCHC RBC-ENTMCNC: 26.9 PG — LOW (ref 27–34)
MCHC RBC-ENTMCNC: 30.9 GM/DL — LOW (ref 32–36)
MCV RBC AUTO: 87.1 FL — SIGNIFICANT CHANGE UP (ref 80–100)
MONOCYTES # BLD AUTO: 0.73 K/UL — SIGNIFICANT CHANGE UP (ref 0–0.9)
MONOCYTES NFR BLD AUTO: 8.8 % — SIGNIFICANT CHANGE UP (ref 2–14)
NEUTROPHILS # BLD AUTO: 5.75 K/UL — SIGNIFICANT CHANGE UP (ref 1.8–7.4)
NEUTROPHILS NFR BLD AUTO: 69.8 % — SIGNIFICANT CHANGE UP (ref 43–77)
NRBC # BLD: 0 /100 WBCS — SIGNIFICANT CHANGE UP (ref 0–0)
PLATELET # BLD AUTO: 302 K/UL — SIGNIFICANT CHANGE UP (ref 150–400)
POTASSIUM SERPL-MCNC: 4.7 MMOL/L — SIGNIFICANT CHANGE UP (ref 3.5–5.3)
POTASSIUM SERPL-SCNC: 4.7 MMOL/L — SIGNIFICANT CHANGE UP (ref 3.5–5.3)
PROT SERPL-MCNC: 7 G/DL — SIGNIFICANT CHANGE UP (ref 6–8.3)
PROTHROM AB SERPL-ACNC: 13.9 SEC — HIGH (ref 9.9–13.4)
RBC # BLD: 4.64 M/UL — SIGNIFICANT CHANGE UP (ref 3.8–5.2)
RBC # FLD: 14.4 % — SIGNIFICANT CHANGE UP (ref 10.3–14.5)
SODIUM SERPL-SCNC: 141 MMOL/L — SIGNIFICANT CHANGE UP (ref 135–145)
WBC # BLD: 8.25 K/UL — SIGNIFICANT CHANGE UP (ref 3.8–10.5)
WBC # FLD AUTO: 8.25 K/UL — SIGNIFICANT CHANGE UP (ref 3.8–10.5)

## 2024-10-05 PROCEDURE — 99232 SBSQ HOSP IP/OBS MODERATE 35: CPT

## 2024-10-05 RX ADMIN — CETIRIZINE HYDROCHLORIDE 10 MILLIGRAM(S): 10 TABLET ORAL at 12:15

## 2024-10-05 RX ADMIN — MEMANTINE 5 MILLIGRAM(S): 10 TABLET ORAL at 12:15

## 2024-10-05 RX ADMIN — Medication 81 MILLIGRAM(S): at 12:15

## 2024-10-05 RX ADMIN — Medication 100 MILLIGRAM(S): at 12:15

## 2024-10-05 RX ADMIN — Medication 5 MILLIGRAM(S): at 22:20

## 2024-10-05 RX ADMIN — FOLIC ACID 1 MILLIGRAM(S): 1 TABLET ORAL at 12:15

## 2024-10-05 RX ADMIN — Medication 1000 MICROGRAM(S): at 12:15

## 2024-10-05 RX ADMIN — HYDRALAZINE HYDROCHLORIDE 10 MILLIGRAM(S): 100 TABLET ORAL at 06:34

## 2024-10-05 RX ADMIN — ROSUVASTATIN CALCIUM 5 MILLIGRAM(S): 20 TABLET, COATED ORAL at 22:20

## 2024-10-05 RX ADMIN — Medication 1 APPLICATION(S): at 12:20

## 2024-10-05 RX ADMIN — Medication 3 MILLIGRAM(S): at 22:20

## 2024-10-05 RX ADMIN — HYDRALAZINE HYDROCHLORIDE 10 MILLIGRAM(S): 100 TABLET ORAL at 17:26

## 2024-10-06 LAB
ALBUMIN SERPL ELPH-MCNC: 3.1 G/DL — LOW (ref 3.3–5)
ALP SERPL-CCNC: 81 U/L — SIGNIFICANT CHANGE UP (ref 40–120)
ALT FLD-CCNC: 21 U/L — SIGNIFICANT CHANGE UP (ref 10–45)
ANION GAP SERPL CALC-SCNC: 12 MMOL/L — SIGNIFICANT CHANGE UP (ref 5–17)
APPEARANCE UR: ABNORMAL
APTT BLD: 33.7 SEC — SIGNIFICANT CHANGE UP (ref 24.5–35.6)
AST SERPL-CCNC: 28 U/L — SIGNIFICANT CHANGE UP (ref 10–40)
BACTERIA # UR AUTO: ABNORMAL /HPF
BASOPHILS # BLD AUTO: 0.07 K/UL — SIGNIFICANT CHANGE UP (ref 0–0.2)
BASOPHILS NFR BLD AUTO: 0.8 % — SIGNIFICANT CHANGE UP (ref 0–2)
BILIRUB SERPL-MCNC: 0.6 MG/DL — SIGNIFICANT CHANGE UP (ref 0.2–1.2)
BILIRUB UR-MCNC: NEGATIVE — SIGNIFICANT CHANGE UP
BUN SERPL-MCNC: 19 MG/DL — SIGNIFICANT CHANGE UP (ref 7–23)
CALCIUM SERPL-MCNC: 9.2 MG/DL — SIGNIFICANT CHANGE UP (ref 8.4–10.5)
CAST: 2 /LPF — SIGNIFICANT CHANGE UP (ref 0–4)
CHLORIDE SERPL-SCNC: 105 MMOL/L — SIGNIFICANT CHANGE UP (ref 96–108)
CO2 SERPL-SCNC: 24 MMOL/L — SIGNIFICANT CHANGE UP (ref 22–31)
COLOR SPEC: YELLOW — SIGNIFICANT CHANGE UP
CREAT SERPL-MCNC: 0.89 MG/DL — SIGNIFICANT CHANGE UP (ref 0.5–1.3)
DIFF PNL FLD: NEGATIVE — SIGNIFICANT CHANGE UP
EGFR: 64 ML/MIN/1.73M2 — SIGNIFICANT CHANGE UP
EOSINOPHIL # BLD AUTO: 0.59 K/UL — HIGH (ref 0–0.5)
EOSINOPHIL NFR BLD AUTO: 6.8 % — HIGH (ref 0–6)
GLUCOSE BLDC GLUCOMTR-MCNC: 77 MG/DL — SIGNIFICANT CHANGE UP (ref 70–99)
GLUCOSE BLDC GLUCOMTR-MCNC: 81 MG/DL — SIGNIFICANT CHANGE UP (ref 70–99)
GLUCOSE SERPL-MCNC: 87 MG/DL — SIGNIFICANT CHANGE UP (ref 70–99)
GLUCOSE UR QL: NEGATIVE MG/DL — SIGNIFICANT CHANGE UP
HCT VFR BLD CALC: 36.5 % — SIGNIFICANT CHANGE UP (ref 34.5–45)
HGB BLD-MCNC: 11.6 G/DL — SIGNIFICANT CHANGE UP (ref 11.5–15.5)
IMM GRANULOCYTES NFR BLD AUTO: 0.7 % — SIGNIFICANT CHANGE UP (ref 0–0.9)
INR BLD: 1.18 RATIO — HIGH (ref 0.85–1.16)
KETONES UR-MCNC: ABNORMAL MG/DL
LEUKOCYTE ESTERASE UR-ACNC: ABNORMAL
LYMPHOCYTES # BLD AUTO: 1.53 K/UL — SIGNIFICANT CHANGE UP (ref 1–3.3)
LYMPHOCYTES # BLD AUTO: 17.6 % — SIGNIFICANT CHANGE UP (ref 13–44)
MCHC RBC-ENTMCNC: 27.6 PG — SIGNIFICANT CHANGE UP (ref 27–34)
MCHC RBC-ENTMCNC: 31.8 GM/DL — LOW (ref 32–36)
MCV RBC AUTO: 86.9 FL — SIGNIFICANT CHANGE UP (ref 80–100)
MONOCYTES # BLD AUTO: 0.9 K/UL — SIGNIFICANT CHANGE UP (ref 0–0.9)
MONOCYTES NFR BLD AUTO: 10.4 % — SIGNIFICANT CHANGE UP (ref 2–14)
NEUTROPHILS # BLD AUTO: 5.52 K/UL — SIGNIFICANT CHANGE UP (ref 1.8–7.4)
NEUTROPHILS NFR BLD AUTO: 63.7 % — SIGNIFICANT CHANGE UP (ref 43–77)
NITRITE UR-MCNC: NEGATIVE — SIGNIFICANT CHANGE UP
NRBC # BLD: 0 /100 WBCS — SIGNIFICANT CHANGE UP (ref 0–0)
PH UR: 6 — SIGNIFICANT CHANGE UP (ref 5–8)
PLATELET # BLD AUTO: 327 K/UL — SIGNIFICANT CHANGE UP (ref 150–400)
POTASSIUM SERPL-MCNC: 4.1 MMOL/L — SIGNIFICANT CHANGE UP (ref 3.5–5.3)
POTASSIUM SERPL-SCNC: 4.1 MMOL/L — SIGNIFICANT CHANGE UP (ref 3.5–5.3)
PROT SERPL-MCNC: 6.8 G/DL — SIGNIFICANT CHANGE UP (ref 6–8.3)
PROT UR-MCNC: 100 MG/DL
PROTHROM AB SERPL-ACNC: 13.5 SEC — HIGH (ref 9.9–13.4)
RBC # BLD: 4.2 M/UL — SIGNIFICANT CHANGE UP (ref 3.8–5.2)
RBC # FLD: 14.4 % — SIGNIFICANT CHANGE UP (ref 10.3–14.5)
RBC CASTS # UR COMP ASSIST: 4 /HPF — SIGNIFICANT CHANGE UP (ref 0–4)
SODIUM SERPL-SCNC: 141 MMOL/L — SIGNIFICANT CHANGE UP (ref 135–145)
SP GR SPEC: 1.02 — SIGNIFICANT CHANGE UP (ref 1–1.03)
SQUAMOUS # UR AUTO: 18 /HPF — HIGH (ref 0–5)
UROBILINOGEN FLD QL: 2 MG/DL (ref 0.2–1)
WBC # BLD: 8.67 K/UL — SIGNIFICANT CHANGE UP (ref 3.8–10.5)
WBC # FLD AUTO: 8.67 K/UL — SIGNIFICANT CHANGE UP (ref 3.8–10.5)
WBC UR QL: 67 /HPF — HIGH (ref 0–5)

## 2024-10-06 PROCEDURE — 70450 CT HEAD/BRAIN W/O DYE: CPT | Mod: 26

## 2024-10-06 PROCEDURE — 99233 SBSQ HOSP IP/OBS HIGH 50: CPT

## 2024-10-06 RX ORDER — SODIUM CHLORIDE IRRIG SOLUTION 0.9 %
1000 SOLUTION, IRRIGATION IRRIGATION
Refills: 0 | Status: DISCONTINUED | OUTPATIENT
Start: 2024-10-06 | End: 2024-10-14

## 2024-10-06 RX ORDER — SODIUM CHLORIDE IRRIG SOLUTION 0.9 %
1000 SOLUTION, IRRIGATION IRRIGATION
Refills: 0 | Status: DISCONTINUED | OUTPATIENT
Start: 2024-10-06 | End: 2024-10-06

## 2024-10-06 RX ADMIN — HYDRALAZINE HYDROCHLORIDE 10 MILLIGRAM(S): 100 TABLET ORAL at 05:59

## 2024-10-06 RX ADMIN — Medication 1000 MICROGRAM(S): at 11:35

## 2024-10-06 RX ADMIN — HYDRALAZINE HYDROCHLORIDE 10 MILLIGRAM(S): 100 TABLET ORAL at 17:23

## 2024-10-06 RX ADMIN — Medication 100 MILLIGRAM(S): at 11:35

## 2024-10-06 RX ADMIN — Medication 81 MILLIGRAM(S): at 11:35

## 2024-10-06 RX ADMIN — Medication 3 MILLIGRAM(S): at 22:29

## 2024-10-06 RX ADMIN — Medication 50 MILLILITER(S): at 10:44

## 2024-10-06 RX ADMIN — ROSUVASTATIN CALCIUM 5 MILLIGRAM(S): 20 TABLET, COATED ORAL at 22:29

## 2024-10-06 RX ADMIN — FOLIC ACID 1 MILLIGRAM(S): 1 TABLET ORAL at 11:36

## 2024-10-06 RX ADMIN — MEMANTINE 5 MILLIGRAM(S): 10 TABLET ORAL at 11:35

## 2024-10-06 RX ADMIN — CETIRIZINE HYDROCHLORIDE 10 MILLIGRAM(S): 10 TABLET ORAL at 11:36

## 2024-10-06 RX ADMIN — Medication 1 APPLICATION(S): at 11:36

## 2024-10-06 RX ADMIN — Medication 5 MILLIGRAM(S): at 22:29

## 2024-10-07 LAB
ANION GAP SERPL CALC-SCNC: 11 MMOL/L — SIGNIFICANT CHANGE UP (ref 5–17)
BUN SERPL-MCNC: 20 MG/DL — SIGNIFICANT CHANGE UP (ref 7–23)
CALCIUM SERPL-MCNC: 9 MG/DL — SIGNIFICANT CHANGE UP (ref 8.4–10.5)
CHLORIDE SERPL-SCNC: 105 MMOL/L — SIGNIFICANT CHANGE UP (ref 96–108)
CO2 SERPL-SCNC: 23 MMOL/L — SIGNIFICANT CHANGE UP (ref 22–31)
CREAT SERPL-MCNC: 1 MG/DL — SIGNIFICANT CHANGE UP (ref 0.5–1.3)
EGFR: 56 ML/MIN/1.73M2 — LOW
GLUCOSE SERPL-MCNC: 95 MG/DL — SIGNIFICANT CHANGE UP (ref 70–99)
HCT VFR BLD CALC: 39.9 % — SIGNIFICANT CHANGE UP (ref 34.5–45)
HGB BLD-MCNC: 12.4 G/DL — SIGNIFICANT CHANGE UP (ref 11.5–15.5)
INR BLD: 1.04 RATIO — SIGNIFICANT CHANGE UP (ref 0.85–1.16)
MCHC RBC-ENTMCNC: 27 PG — SIGNIFICANT CHANGE UP (ref 27–34)
MCHC RBC-ENTMCNC: 31.1 GM/DL — LOW (ref 32–36)
MCV RBC AUTO: 86.7 FL — SIGNIFICANT CHANGE UP (ref 80–100)
NRBC # BLD: 0 /100 WBCS — SIGNIFICANT CHANGE UP (ref 0–0)
PLATELET # BLD AUTO: 339 K/UL — SIGNIFICANT CHANGE UP (ref 150–400)
POTASSIUM SERPL-MCNC: 4 MMOL/L — SIGNIFICANT CHANGE UP (ref 3.5–5.3)
POTASSIUM SERPL-SCNC: 4 MMOL/L — SIGNIFICANT CHANGE UP (ref 3.5–5.3)
PROTHROM AB SERPL-ACNC: 12 SEC — SIGNIFICANT CHANGE UP (ref 9.9–13.4)
RBC # BLD: 4.6 M/UL — SIGNIFICANT CHANGE UP (ref 3.8–5.2)
RBC # FLD: 14.7 % — HIGH (ref 10.3–14.5)
SODIUM SERPL-SCNC: 139 MMOL/L — SIGNIFICANT CHANGE UP (ref 135–145)
WBC # BLD: 9.35 K/UL — SIGNIFICANT CHANGE UP (ref 3.8–10.5)
WBC # FLD AUTO: 9.35 K/UL — SIGNIFICANT CHANGE UP (ref 3.8–10.5)

## 2024-10-07 PROCEDURE — 99233 SBSQ HOSP IP/OBS HIGH 50: CPT

## 2024-10-07 RX ORDER — CEFTRIAXONE SODIUM 1 G
1000 VIAL (EA) INJECTION EVERY 24 HOURS
Refills: 0 | Status: COMPLETED | OUTPATIENT
Start: 2024-10-08 | End: 2024-10-10

## 2024-10-07 RX ORDER — CEFTRIAXONE SODIUM 1 G
1000 VIAL (EA) INJECTION ONCE
Refills: 0 | Status: COMPLETED | OUTPATIENT
Start: 2024-10-07 | End: 2024-10-07

## 2024-10-07 RX ORDER — CEFTRIAXONE SODIUM 1 G
VIAL (EA) INJECTION
Refills: 0 | Status: COMPLETED | OUTPATIENT
Start: 2024-10-07 | End: 2024-10-11

## 2024-10-07 RX ADMIN — Medication 1 APPLICATION(S): at 18:13

## 2024-10-07 RX ADMIN — Medication 3 MILLIGRAM(S): at 22:49

## 2024-10-07 RX ADMIN — HYDRALAZINE HYDROCHLORIDE 10 MILLIGRAM(S): 100 TABLET ORAL at 06:21

## 2024-10-07 RX ADMIN — Medication 100 MILLIGRAM(S): at 10:11

## 2024-10-07 RX ADMIN — ROSUVASTATIN CALCIUM 5 MILLIGRAM(S): 20 TABLET, COATED ORAL at 22:49

## 2024-10-07 RX ADMIN — HYDRALAZINE HYDROCHLORIDE 10 MILLIGRAM(S): 100 TABLET ORAL at 18:13

## 2024-10-07 RX ADMIN — Medication 5 MILLIGRAM(S): at 22:49

## 2024-10-08 DIAGNOSIS — G93.41 METABOLIC ENCEPHALOPATHY: ICD-10-CM

## 2024-10-08 LAB
ALBUMIN SERPL ELPH-MCNC: 3.4 G/DL — SIGNIFICANT CHANGE UP (ref 3.3–5)
ALP SERPL-CCNC: 99 U/L — SIGNIFICANT CHANGE UP (ref 40–120)
ALT FLD-CCNC: 22 U/L — SIGNIFICANT CHANGE UP (ref 10–45)
ANION GAP SERPL CALC-SCNC: 11 MMOL/L — SIGNIFICANT CHANGE UP (ref 5–17)
AST SERPL-CCNC: 25 U/L — SIGNIFICANT CHANGE UP (ref 10–40)
BILIRUB SERPL-MCNC: 0.7 MG/DL — SIGNIFICANT CHANGE UP (ref 0.2–1.2)
BUN SERPL-MCNC: 18 MG/DL — SIGNIFICANT CHANGE UP (ref 7–23)
CALCIUM SERPL-MCNC: 9.2 MG/DL — SIGNIFICANT CHANGE UP (ref 8.4–10.5)
CHLORIDE SERPL-SCNC: 103 MMOL/L — SIGNIFICANT CHANGE UP (ref 96–108)
CO2 SERPL-SCNC: 25 MMOL/L — SIGNIFICANT CHANGE UP (ref 22–31)
CREAT SERPL-MCNC: 0.91 MG/DL — SIGNIFICANT CHANGE UP (ref 0.5–1.3)
EGFR: 62 ML/MIN/1.73M2 — SIGNIFICANT CHANGE UP
GLUCOSE SERPL-MCNC: 83 MG/DL — SIGNIFICANT CHANGE UP (ref 70–99)
HCT VFR BLD CALC: 40.1 % — SIGNIFICANT CHANGE UP (ref 34.5–45)
HGB BLD-MCNC: 12.5 G/DL — SIGNIFICANT CHANGE UP (ref 11.5–15.5)
MCHC RBC-ENTMCNC: 27.4 PG — SIGNIFICANT CHANGE UP (ref 27–34)
MCHC RBC-ENTMCNC: 31.2 GM/DL — LOW (ref 32–36)
MCV RBC AUTO: 87.7 FL — SIGNIFICANT CHANGE UP (ref 80–100)
NRBC # BLD: 0 /100 WBCS — SIGNIFICANT CHANGE UP (ref 0–0)
PLATELET # BLD AUTO: 347 K/UL — SIGNIFICANT CHANGE UP (ref 150–400)
POTASSIUM SERPL-MCNC: 3.8 MMOL/L — SIGNIFICANT CHANGE UP (ref 3.5–5.3)
POTASSIUM SERPL-SCNC: 3.8 MMOL/L — SIGNIFICANT CHANGE UP (ref 3.5–5.3)
PROT SERPL-MCNC: 7.1 G/DL — SIGNIFICANT CHANGE UP (ref 6–8.3)
RBC # BLD: 4.57 M/UL — SIGNIFICANT CHANGE UP (ref 3.8–5.2)
RBC # FLD: 14.4 % — SIGNIFICANT CHANGE UP (ref 10.3–14.5)
SODIUM SERPL-SCNC: 139 MMOL/L — SIGNIFICANT CHANGE UP (ref 135–145)
WBC # BLD: 11.08 K/UL — HIGH (ref 3.8–10.5)
WBC # FLD AUTO: 11.08 K/UL — HIGH (ref 3.8–10.5)

## 2024-10-08 PROCEDURE — 99233 SBSQ HOSP IP/OBS HIGH 50: CPT

## 2024-10-08 RX ADMIN — HYDRALAZINE HYDROCHLORIDE 10 MILLIGRAM(S): 100 TABLET ORAL at 23:00

## 2024-10-08 RX ADMIN — ROSUVASTATIN CALCIUM 5 MILLIGRAM(S): 20 TABLET, COATED ORAL at 23:00

## 2024-10-08 RX ADMIN — Medication 5 MILLIGRAM(S): at 23:00

## 2024-10-08 RX ADMIN — Medication 100 MILLIGRAM(S): at 08:55

## 2024-10-08 RX ADMIN — HYDRALAZINE HYDROCHLORIDE 10 MILLIGRAM(S): 100 TABLET ORAL at 06:06

## 2024-10-08 NOTE — PROGRESS NOTE ADULT - NSPROGADDITIONALINFOA_GEN_ALL_CORE
Case was escalated and plan for LP with anesthesia at 2pm on Wednesday. Neurorads  called to reconfirm date and timing of procedure Case was escalated and plan for LP with anesthesia at 2pm on Wednesday(10/9). Neurorads  called to reconfirm date and timing of procedure

## 2024-10-08 NOTE — CHART NOTE - NSCHARTNOTEFT_GEN_A_CORE
CLINICAL INDICATION: Lumbar Puncture under Anesthesia    - as per primary team (LINDA Peña) diagnostic LP under anesthesia tomorrow 10/7/24 at 2:30pm  - STAT labs in AM (cbc, coags)  - NPO at midnight  - please hold subQ heparin dose 6 hours prior to spinal tap   - please ensure requested csf studies have been ordered prior to procedure CLINICAL INDICATION: Lumbar Puncture under Anesthesia    - as per primary team (LINDA Peña) diagnostic LP under anesthesia tomorrow 10/9/24 at 2:30pm  - STAT labs in AM (cbc, coags)  - NPO at midnight  - please hold subQ heparin dose 6 hours prior to spinal tap   - please ensure requested csf studies have been ordered prior to procedure

## 2024-10-09 LAB
APTT BLD: 35.7 SEC — HIGH (ref 24.5–35.6)
HCT VFR BLD CALC: 41.1 % — SIGNIFICANT CHANGE UP (ref 34.5–45)
HGB BLD-MCNC: 12.8 G/DL — SIGNIFICANT CHANGE UP (ref 11.5–15.5)
INR BLD: 1.25 RATIO — HIGH (ref 0.85–1.16)
MCHC RBC-ENTMCNC: 27.1 PG — SIGNIFICANT CHANGE UP (ref 27–34)
MCHC RBC-ENTMCNC: 31.1 GM/DL — LOW (ref 32–36)
MCV RBC AUTO: 87.1 FL — SIGNIFICANT CHANGE UP (ref 80–100)
NRBC # BLD: 0 /100 WBCS — SIGNIFICANT CHANGE UP (ref 0–0)
PLATELET # BLD AUTO: 344 K/UL — SIGNIFICANT CHANGE UP (ref 150–400)
PROTHROM AB SERPL-ACNC: 14.3 SEC — HIGH (ref 9.9–13.4)
RBC # BLD: 4.72 M/UL — SIGNIFICANT CHANGE UP (ref 3.8–5.2)
RBC # FLD: 14.6 % — HIGH (ref 10.3–14.5)
WBC # BLD: 8.78 K/UL — SIGNIFICANT CHANGE UP (ref 3.8–10.5)
WBC # FLD AUTO: 8.78 K/UL — SIGNIFICANT CHANGE UP (ref 3.8–10.5)

## 2024-10-09 PROCEDURE — 99232 SBSQ HOSP IP/OBS MODERATE 35: CPT

## 2024-10-09 RX ADMIN — MEMANTINE 5 MILLIGRAM(S): 10 TABLET ORAL at 11:22

## 2024-10-09 RX ADMIN — Medication 81 MILLIGRAM(S): at 11:23

## 2024-10-09 RX ADMIN — Medication 650 MILLIGRAM(S): at 21:48

## 2024-10-09 RX ADMIN — FOLIC ACID 1 MILLIGRAM(S): 1 TABLET ORAL at 11:22

## 2024-10-09 RX ADMIN — Medication 100 MILLIGRAM(S): at 09:06

## 2024-10-09 RX ADMIN — CETIRIZINE HYDROCHLORIDE 10 MILLIGRAM(S): 10 TABLET ORAL at 11:23

## 2024-10-09 RX ADMIN — Medication 650 MILLIGRAM(S): at 22:18

## 2024-10-09 RX ADMIN — HYDRALAZINE HYDROCHLORIDE 10 MILLIGRAM(S): 100 TABLET ORAL at 06:52

## 2024-10-09 RX ADMIN — Medication 5 MILLIGRAM(S): at 21:15

## 2024-10-09 RX ADMIN — ROSUVASTATIN CALCIUM 5 MILLIGRAM(S): 20 TABLET, COATED ORAL at 21:17

## 2024-10-09 RX ADMIN — Medication 1 APPLICATION(S): at 11:23

## 2024-10-09 RX ADMIN — Medication 1000 MICROGRAM(S): at 11:23

## 2024-10-09 RX ADMIN — Medication 3 MILLIGRAM(S): at 21:49

## 2024-10-09 RX ADMIN — Medication 100 MILLIGRAM(S): at 11:22

## 2024-10-09 RX ADMIN — HYDRALAZINE HYDROCHLORIDE 10 MILLIGRAM(S): 100 TABLET ORAL at 21:16

## 2024-10-09 NOTE — CHART NOTE - NSCHARTNOTEFT_GEN_A_CORE
Lumbar Puncture under Anesthesia    - anesthesia unavailable today - if this changes please reach out to [phone]  - diagnostic LP under anesthesia scheduled by neuro rads for 10/10/24   - STAT labs in AM (cbc, coags)  - NPO at midnight  - please hold subQ heparin before midnight (if applicable)  - please ensure requested csf studies have been ordered prior to procedure Lumbar Puncture under Anesthesia    - anesthesia unavailable today - if this changes please reach out to [phone]  - diagnostic LP under anesthesia scheduled by neuro rads for 10/10/24 at 10:30am  - STAT labs in AM (cbc, coags)  - NPO at midnight  - please hold subQ heparin before midnight (if applicable)  - please ensure requested csf studies have been ordered prior to procedure

## 2024-10-10 ENCOUNTER — RESULT REVIEW (OUTPATIENT)
Age: 84
End: 2024-10-10

## 2024-10-10 LAB
ALBUMIN SERPL ELPH-MCNC: 3 G/DL — LOW (ref 3.3–5)
ALP SERPL-CCNC: 91 U/L — SIGNIFICANT CHANGE UP (ref 40–120)
ALT FLD-CCNC: 16 U/L — SIGNIFICANT CHANGE UP (ref 10–45)
ANION GAP SERPL CALC-SCNC: 12 MMOL/L — SIGNIFICANT CHANGE UP (ref 5–17)
APPEARANCE CSF: CLEAR — SIGNIFICANT CHANGE UP
APTT BLD: 34.2 SEC — SIGNIFICANT CHANGE UP (ref 24.5–35.6)
AST SERPL-CCNC: 23 U/L — SIGNIFICANT CHANGE UP (ref 10–40)
BILIRUB SERPL-MCNC: 0.5 MG/DL — SIGNIFICANT CHANGE UP (ref 0.2–1.2)
BUN SERPL-MCNC: 22 MG/DL — SIGNIFICANT CHANGE UP (ref 7–23)
CALCIUM SERPL-MCNC: 9.1 MG/DL — SIGNIFICANT CHANGE UP (ref 8.4–10.5)
CHLORIDE SERPL-SCNC: 105 MMOL/L — SIGNIFICANT CHANGE UP (ref 96–108)
CO2 SERPL-SCNC: 23 MMOL/L — SIGNIFICANT CHANGE UP (ref 22–31)
COLOR CSF: SIGNIFICANT CHANGE UP
CREAT SERPL-MCNC: 1.07 MG/DL — SIGNIFICANT CHANGE UP (ref 0.5–1.3)
CRYPTOC AG CSF-ACNC: NEGATIVE — SIGNIFICANT CHANGE UP
CSF PCR RESULT: SIGNIFICANT CHANGE UP
EGFR: 51 ML/MIN/1.73M2 — LOW
GLUCOSE CSF-MCNC: 59 MG/DL — SIGNIFICANT CHANGE UP (ref 40–70)
GLUCOSE SERPL-MCNC: 72 MG/DL — SIGNIFICANT CHANGE UP (ref 70–99)
GRAM STN FLD: SIGNIFICANT CHANGE UP
HCT VFR BLD CALC: 36.8 % — SIGNIFICANT CHANGE UP (ref 34.5–45)
HGB BLD-MCNC: 11.4 G/DL — LOW (ref 11.5–15.5)
INR BLD: 1.31 RATIO — HIGH (ref 0.85–1.16)
MCHC RBC-ENTMCNC: 27.3 PG — SIGNIFICANT CHANGE UP (ref 27–34)
MCHC RBC-ENTMCNC: 31 GM/DL — LOW (ref 32–36)
MCV RBC AUTO: 88 FL — SIGNIFICANT CHANGE UP (ref 80–100)
NEUTROPHILS # CSF: SIGNIFICANT CHANGE UP (ref 0–6)
NRBC # BLD: 0 /100 WBCS — SIGNIFICANT CHANGE UP (ref 0–0)
NRBC NFR CSF: <1 /UL — SIGNIFICANT CHANGE UP (ref 0–5)
PLATELET # BLD AUTO: 330 K/UL — SIGNIFICANT CHANGE UP (ref 150–400)
POTASSIUM SERPL-MCNC: 4.2 MMOL/L — SIGNIFICANT CHANGE UP (ref 3.5–5.3)
POTASSIUM SERPL-SCNC: 4.2 MMOL/L — SIGNIFICANT CHANGE UP (ref 3.5–5.3)
PROT CSF-MCNC: 42 MG/DL — SIGNIFICANT CHANGE UP (ref 15–45)
PROT SERPL-MCNC: 6.5 G/DL — SIGNIFICANT CHANGE UP (ref 6–8.3)
PROTHROM AB SERPL-ACNC: 14.9 SEC — HIGH (ref 9.9–13.4)
RBC # BLD: 4.18 M/UL — SIGNIFICANT CHANGE UP (ref 3.8–5.2)
RBC # CSF: 4 /UL — HIGH (ref 0–0)
RBC # FLD: 14.6 % — HIGH (ref 10.3–14.5)
SODIUM SERPL-SCNC: 140 MMOL/L — SIGNIFICANT CHANGE UP (ref 135–145)
SPECIMEN SOURCE: SIGNIFICANT CHANGE UP
TUBE TYPE: SIGNIFICANT CHANGE UP
WBC # BLD: 10.13 K/UL — SIGNIFICANT CHANGE UP (ref 3.8–10.5)
WBC # FLD AUTO: 10.13 K/UL — SIGNIFICANT CHANGE UP (ref 3.8–10.5)

## 2024-10-10 PROCEDURE — ZZZZZ: CPT

## 2024-10-10 PROCEDURE — 62328 DX LMBR SPI PNXR W/FLUOR/CT: CPT

## 2024-10-10 PROCEDURE — 88108 CYTOPATH CONCENTRATE TECH: CPT | Mod: 26

## 2024-10-10 PROCEDURE — 99233 SBSQ HOSP IP/OBS HIGH 50: CPT

## 2024-10-10 RX ADMIN — HYDRALAZINE HYDROCHLORIDE 10 MILLIGRAM(S): 100 TABLET ORAL at 17:04

## 2024-10-10 RX ADMIN — Medication 1000 MICROGRAM(S): at 14:00

## 2024-10-10 RX ADMIN — CETIRIZINE HYDROCHLORIDE 10 MILLIGRAM(S): 10 TABLET ORAL at 13:59

## 2024-10-10 RX ADMIN — ROSUVASTATIN CALCIUM 5 MILLIGRAM(S): 20 TABLET, COATED ORAL at 22:10

## 2024-10-10 RX ADMIN — MEMANTINE 5 MILLIGRAM(S): 10 TABLET ORAL at 14:00

## 2024-10-10 RX ADMIN — Medication 100 MILLIGRAM(S): at 14:00

## 2024-10-10 RX ADMIN — HYDRALAZINE HYDROCHLORIDE 10 MILLIGRAM(S): 100 TABLET ORAL at 05:36

## 2024-10-10 RX ADMIN — FOLIC ACID 1 MILLIGRAM(S): 1 TABLET ORAL at 14:03

## 2024-10-10 RX ADMIN — Medication 81 MILLIGRAM(S): at 13:59

## 2024-10-10 RX ADMIN — Medication 5 MILLIGRAM(S): at 22:11

## 2024-10-10 RX ADMIN — Medication 100 MILLIGRAM(S): at 13:59

## 2024-10-10 RX ADMIN — Medication 3 MILLIGRAM(S): at 22:11

## 2024-10-10 RX ADMIN — Medication 650 MILLIGRAM(S): at 22:11

## 2024-10-10 RX ADMIN — Medication 650 MILLIGRAM(S): at 22:41

## 2024-10-10 RX ADMIN — Medication 1 APPLICATION(S): at 14:01

## 2024-10-10 RX ADMIN — Medication 650 MILLIGRAM(S): at 16:54

## 2024-10-10 NOTE — PROCEDURE NOTE - ADDITIONAL PROCEDURE DETAILS
PREPROCEDURE:    Patient presents for fluoroscopically guided lumbar puncture under anesthesia. Received patient on stretcher. Breathing on room air, spontaneously and unlabored. Risks and benefits were discussed with patient and/or health care proxy.  Risks include but are not limited to headache, bleeding, infection and nerve damage.    Patient and/or health care proxy understands and consents to procedure.    POSTPROCEDURE:    Lumbar puncture was performed at the L3-L4 level using a 20-gauge-6-inch spinal needle using fluoroscopic guidance. 18 cc of clear spinal fluid was collected and hand delivered to the laboratory. Patient tolerated the procedure well and left the department in stable condition.    Attending: ROSIE BOYCE MD

## 2024-10-10 NOTE — PRE-ANESTHESIA EVALUATION ADULT - NSANTHOSAYNRD_GEN_A_CORE
No. LIEN screening performed.  STOP BANG Legend: 0-2 = LOW Risk; 3-4 = INTERMEDIATE Risk; 5-8 = HIGH Risk

## 2024-10-10 NOTE — PROVIDER CONTACT NOTE (MEDICATION) - ASSESSMENT
pt A&Ox1 w/ illogical speech. VSS. pt is incontinent x2. No complaints of cp, dizziness or sob or other discomfort. IV site assessed and remains WDL.

## 2024-10-11 LAB
NON-GYNECOLOGICAL CYTOLOGY STUDY: SIGNIFICANT CHANGE UP
TM INTERPRETATION: SIGNIFICANT CHANGE UP

## 2024-10-11 PROCEDURE — 99222 1ST HOSP IP/OBS MODERATE 55: CPT | Mod: FS

## 2024-10-11 PROCEDURE — 99233 SBSQ HOSP IP/OBS HIGH 50: CPT

## 2024-10-11 RX ADMIN — ROSUVASTATIN CALCIUM 5 MILLIGRAM(S): 20 TABLET, COATED ORAL at 22:22

## 2024-10-11 RX ADMIN — CETIRIZINE HYDROCHLORIDE 10 MILLIGRAM(S): 10 TABLET ORAL at 11:01

## 2024-10-11 RX ADMIN — Medication 81 MILLIGRAM(S): at 11:01

## 2024-10-11 RX ADMIN — Medication 100 MILLIGRAM(S): at 11:01

## 2024-10-11 RX ADMIN — MEMANTINE 5 MILLIGRAM(S): 10 TABLET ORAL at 11:01

## 2024-10-11 RX ADMIN — Medication 500 MILLIGRAM(S): at 17:05

## 2024-10-11 RX ADMIN — HYDRALAZINE HYDROCHLORIDE 10 MILLIGRAM(S): 100 TABLET ORAL at 17:05

## 2024-10-11 RX ADMIN — FOLIC ACID 1 MILLIGRAM(S): 1 TABLET ORAL at 11:01

## 2024-10-11 RX ADMIN — Medication 1 APPLICATION(S): at 11:01

## 2024-10-11 RX ADMIN — Medication 1000 MICROGRAM(S): at 11:01

## 2024-10-11 RX ADMIN — Medication 5 MILLIGRAM(S): at 22:22

## 2024-10-11 RX ADMIN — Medication 500 MILLIGRAM(S): at 09:21

## 2024-10-11 RX ADMIN — HYDRALAZINE HYDROCHLORIDE 10 MILLIGRAM(S): 100 TABLET ORAL at 05:19

## 2024-10-11 RX ADMIN — Medication 3 MILLIGRAM(S): at 22:22

## 2024-10-11 NOTE — CONSULT NOTE ADULT - SUBJECTIVE AND OBJECTIVE BOX
Wound SURGERY CONSULT NOTE  HPI:  82 y/o F with PMHx of dementia A&Ox3 at baseline, presenting for AMS of 1 week duration. Patient is A&Ox0 at bedside so all collateral information has been gathered from her son and charts. A per son, patient resides at [facility] in [place]. Son reports that in the past week, pt has had a rapid decline in her mental status. She is normally orientated x 3, able to converse and complete all ADLs like cooking, bathing, dressing herself. Over the past 4-5 days, pt has become mroe disoriented, unable to walk, unable to feed herself, and has fallen twice. She presented at [facility] on Wednesday s/p fall where workup was unremarkable (CT Head, X-Rays, UA, CBC, Troponin). Over the past three days, decline has continued and she is now slumped on her R side. She does not report pain, no chest pain/pressure, no shortness of breath, no dsyuria.    In ED, VSS with intermittent HTN otherwise HDS. CBC remarkable for leukocytosis of 12.51, BMP wnl, trop negative x2 (24->23), UA negative for infection, and RVP negative. CXR showed possible RLL atelectasis and CTH non con negative for acute hemorrhage or midline shift. In ED patient was given 1L NaCl (29 Sep 2024 23:47)    Wound consult requested by team to assist w/ management of skin tears to the right hand and Left anterior leg.   Pt unable to give history of these wounds due to altered mental status. Offloading and pericare initiated upon admission as pt Increasingly sedentary 2/2 to illness. Pt is Incontinent of urine & stool.   No h/o bites, scratches.      Current Diet: Diet, Pureed:   No Concentrated Potassium (09-30-24 @ 02:09)      PAST MEDICAL & SURGICAL HISTORY:  Dementia      HTN (hypertension)      No significant past surgical history          REVIEW OF SYSTEMS: Pt unable to offer  General/ Breast/ Skin/Vasc/ Neuro/ MSK: see HPI  All other systems negative    MEDICATIONS  (STANDING):  AQUAPHOR (petrolatum Ointment) 1 Application(s) Topical daily  aspirin enteric coated 81 milliGRAM(s) Oral daily  cefuroxime   Tablet 500 milliGRAM(s) Oral every 12 hours  cetirizine 10 milliGRAM(s) Oral daily  cyanocobalamin 1000 MICROGram(s) Oral daily  dextrose 5% + sodium chloride 0.45%. 1000 milliLiter(s) (50 mL/Hr) IV Continuous <Continuous>  donepezil 5 milliGRAM(s) Oral at bedtime  folic acid 1 milliGRAM(s) Oral daily  hydrALAZINE 10 milliGRAM(s) Oral two times a day  influenza  Vaccine (HIGH DOSE) 0.5 milliLiter(s) IntraMuscular once  memantine 5 milliGRAM(s) Oral daily  pyridoxine 100 milliGRAM(s) Oral daily  rosuvastatin 5 milliGRAM(s) Oral at bedtime    MEDICATIONS  (PRN):  acetaminophen     Tablet .. 650 milliGRAM(s) Oral every 6 hours PRN Temp greater or equal to 38C (100.4F), Mild Pain (1 - 3)  aluminum hydroxide/magnesium hydroxide/simethicone Suspension 30 milliLiter(s) Oral every 4 hours PRN Dyspepsia  melatonin 3 milliGRAM(s) Oral at bedtime PRN Insomnia  ondansetron Injectable 4 milliGRAM(s) IV Push every 8 hours PRN Nausea and/or Vomiting      Allergies    No Known Allergies    Intolerances        FAMILY HISTORY:  No pertinent family history in first degree relatives     no h/o PVD or wound healing or skin/ significant problems    PHYSICAL EXAM:  Vital Signs Last 24 Hrs  T(C): 36.8 (11 Oct 2024 04:41), Max: 37.2 (10 Oct 2024 15:46)  T(F): 98.2 (11 Oct 2024 04:41), Max: 98.9 (10 Oct 2024 15:46)  HR: 103 (11 Oct 2024 04:41) (54 - 103)  BP: 168/83 (11 Oct 2024 04:41) (138/68 - 171/80)  BP(mean): 111 (10 Oct 2024 12:15) (89 - 111)  RR: 18 (11 Oct 2024 04:41) (15 - 18)  SpO2: 97% (11 Oct 2024 04:41) (95% - 100%)    Parameters below as of 11 Oct 2024 04:41  Patient On (Oxygen Delivery Method): room air        NAD,  A&Ox1/ Alert  Obese, frail  WD/ WN/ WG  HEENT:  NC/AT, PERRL, EOMI, sclera clear, mucosa moist, throat clear, trachea midline, neck supple, trach  Respiratory: nonlabored w/ equal chest rise  Gastrointestinal: soft NT/ND (+)BS  (+)PEG (+)ostomy (+)NGT  : (+) purewick  Neurology:  weakened strength & sensation grossly intact, paraesthesia  nonverbal, no follow commands, paraplegic  Psych: calm/ appropriate/ flat affect/ easily agitated/ restless/ anxious/ difficult to assess  Musculoskeletal:  limited stiff / p/FROM, no deformities/ contractures  Vascular: BLE equally warm,  no cyanosis, clubbing, edema nor acute ischemia           BLE DP/PT pulses palpable  Skin:       Right hand - linear skin tear in proximity to IV insertion site.      L anterior leg - healing skin tear with xerosis  No odor, erythema, increased warmth, tenderness, induration, fluctuance, nor crepitus    LABS/ CULTURES/ RADIOLOGY:                        11.4   10.13 )-----------( 330      ( 10 Oct 2024 07:23 )             36.8       140  |  105  |  22  ----------------------------<  72      [10-10-24 @ 07:23]  4.2   |  23  |  1.07        Ca     9.1     [10-10-24 @ 07:23]    TPro  6.5  /  Alb  3.0  /  TBili  0.5  /  DBili  x   /  AST  23  /  ALT  16  /  AlkPhos  91  [10-10-24 @ 07:23]    PT/INR: PT 14.9 , INR 1.31       [10-10-24 @ 07:23]  PTT: 34.2       [10-10-24 @ 07:23]                              
Neurology Consult    Reason for Consult: Patient is a 83y old  Female who presents with a chief complaint of AMS (30 Sep 2024 14:51)      HPI:  82 y/o F with PMHx of dementia A&Ox3 at baseline, presenting for AMS of 1 week duration. Patient is A&Ox0 at bedside so all collateral information has been gathered from her son and charts. A per son, patient resides at [facility] in [place]. Son reports that in the past week, pt has had a rapid decline in her mental status. She is normally orientated x 3, able to converse and complete all ADLs like cooking, bathing, dressing herself. Over the past 4-5 days, pt has become mroe disoriented, unable to walk, unable to feed herself, and has fallen twice. She presented at [facility] on Wednesday s/p fall where workup was unremarkable (CT Head, X-Rays, UA, CBC, Troponin). Over the past three days, decline has continued and she is now slumped on her R side. She does not report pain, no chest pain/pressure, no shortness of breath, no dsyuria.    In ED, VSS with intermittent HTN otherwise HDS. CBC remarkable for leukocytosis of 12.51, BMP wnl, trop negative x2 (24->23), UA negative for infection, and RVP negative. CXR showed possible RLL atelectasis and CTH non con negative for acute hemorrhage or midline shift. In ED patient was given 1L NaCl (29 Sep 2024 23:47)       PAST MEDICAL & SURGICAL HISTORY:  Dementia      HTN (hypertension)      No significant past surgical history          Allergies: Allergies    No Known Allergies    Intolerances        Social History: Denies toxic habits including tobacco, ETOH or illicit drugs.    Family History: FAMILY HISTORY:  No pertinent family history in first degree relatives    . No family history of strokes    Medications: MEDICATIONS  (STANDING):  aspirin enteric coated 81 milliGRAM(s) Oral daily  cetirizine 10 milliGRAM(s) Oral daily  donepezil 5 milliGRAM(s) Oral at bedtime  folic acid 1 milliGRAM(s) Oral daily  heparin   Injectable 5000 Unit(s) SubCutaneous every 8 hours  hydrALAZINE 10 milliGRAM(s) Oral two times a day  memantine 5 milliGRAM(s) Oral daily  pyridoxine 100 milliGRAM(s) Oral daily  rosuvastatin 5 milliGRAM(s) Oral at bedtime    MEDICATIONS  (PRN):  acetaminophen     Tablet .. 650 milliGRAM(s) Oral every 6 hours PRN Temp greater or equal to 38C (100.4F), Mild Pain (1 - 3)  aluminum hydroxide/magnesium hydroxide/simethicone Suspension 30 milliLiter(s) Oral every 4 hours PRN Dyspepsia  melatonin 3 milliGRAM(s) Oral at bedtime PRN Insomnia  ondansetron Injectable 4 milliGRAM(s) IV Push every 8 hours PRN Nausea and/or Vomiting      Review of Systems: limited given mental status   CONSTITUTIONAL:  No weight loss, fever, chills, weakness or fatigue.  HEENT:  Eyes:  No visual loss, blurred vision, double vision or yellow sclera. Ears, Nose, Throat:  No hearing loss, sneezing, congestion, runny nose or sore throat.  SKIN:  No rash or itching.  CARDIOVASCULAR:  No chest pain, chest pressure or chest discomfort. No palpitations or edema.  RESPIRATORY:  No shortness of breath, cough or sputum.  GASTROINTESTINAL:  No anorexia, nausea, vomiting or diarrhea. No abdominal pain or blood.  GENITOURINARY:  No burning on urination or incontinence   NEUROLOGICAL:  No headache, dizziness, syncope, paralysis, ataxia, numbness or tingling in the extremities. No change in bowel or bladder control. no limb weakness. no vision changes.   MUSCULOSKELETAL:  No muscle, back pain, joint pain or stiffness.  HEMATOLOGIC:  No anemia, bleeding or bruising.  LYMPHATICS:  No enlarged nodes. No history of splenectomy.  PSYCHIATRIC:  No history of depression or anxiety.  ENDOCRINOLOGIC:  No reports of sweating, cold or heat intolerance. No polyuria or polydipsia.      Vitals:  Vital Signs Last 24 Hrs  T(C): 36.5 (01 Oct 2024 04:51), Max: 36.9 (30 Sep 2024 14:58)  T(F): 97.7 (01 Oct 2024 04:51), Max: 98.5 (30 Sep 2024 14:58)  HR: 70 (01 Oct 2024 04:51) (65 - 74)  BP: 164/80 (01 Oct 2024 04:51) (144/62 - 164/80)  BP(mean): --  RR: 18 (01 Oct 2024 04:51) (18 - 18)  SpO2: 95% (01 Oct 2024 04:51) (94% - 96%)    Parameters below as of 01 Oct 2024 04:51  Patient On (Oxygen Delivery Method): room air        General Exam:   General Appearance: Appropriately dressed and in no acute distress       Head: Normocephalic, atraumatic and no dysmorphic features  Ear, Nose, and Throat: Moist mucous membranes  CVS: S1S2+  Resp: No SOB, no wheeze or rhonchi  GI: soft NT/ND  Extremities: No edema or cyanosis  Skin: No bruises or rashes     Neurological Exam:  Mental Status: Awake, alert and oriented x1  Able to follow simple and complex verbal commands. Able to name and repeat. fluent speech. No obvious aphasia or dysarthria noted.   Cranial Nerves: PERRL, EOMI, VFFC, sensation V1-V3 intact,  no obvious facial asymmetry, equal elevation of palate, scm/trap 5/5, tongue is midline on protrusion. no obvious papilledema on fundoscopic exam. hearing is grossly intact.   Motor: Normal bulk, tone and strength throughout but moving uppers 5/5 > lowers 3/5   Sensation: Intact to light touch and pinprick throughout. no right/left confusion. no extinction to tactile on DSS.   Reflexes: 1+ throughout at biceps, brachioradialis, triceps, patellars and ankles bilaterally and equal. No clonus. R toe and L toe were both downgoing.  Coordination: No dysmetria on FNF     Gait: deferred     Data/Labs/Imaging which I personally reviewed.     Labs:     CBC Full  -  ( 01 Oct 2024 07:17 )  WBC Count : 9.61 K/uL  RBC Count : 4.12 M/uL  Hemoglobin : 11.2 g/dL  Hematocrit : 36.1 %  Platelet Count - Automated : 272 K/uL  Mean Cell Volume : 87.6 fl  Mean Cell Hemoglobin : 27.2 pg  Mean Cell Hemoglobin Concentration : 31.0 gm/dL  Auto Neutrophil # : x  Auto Lymphocyte # : x  Auto Monocyte # : x  Auto Eosinophil # : x  Auto Basophil # : x  Auto Neutrophil % : x  Auto Lymphocyte % : x  Auto Monocyte % : x  Auto Eosinophil % : x  Auto Basophil % : x    10-01    142  |  107  |  26[H]  ----------------------------<  65[L]  4.3   |  22  |  0.88    Ca    9.1      01 Oct 2024 07:05  Phos  2.8     10-01  Mg     1.9     10-01    TPro  7.4  /  Alb  3.7  /  TBili  0.5  /  DBili  x   /  AST  23  /  ALT  16  /  AlkPhos  64  09-29    LIVER FUNCTIONS - ( 29 Sep 2024 13:48 )  Alb: 3.7 g/dL / Pro: 7.4 g/dL / ALK PHOS: 64 U/L / ALT: 16 U/L / AST: 23 U/L / GGT: x             Urinalysis Basic - ( 01 Oct 2024 07:05 )    Color: x / Appearance: x / SG: x / pH: x  Gluc: 65 mg/dL / Ketone: x  / Bili: x / Urobili: x   Blood: x / Protein: x / Nitrite: x   Leuk Esterase: x / RBC: x / WBC x   Sq Epi: x / Non Sq Epi: x / Bacteria: x    < from: CT Head No Cont (09.29.24 @ 16:17) >    ACC: [id number] EXAM:  CT BRAIN   ORDERED BY:  YOVANY VALDES     PROCEDURE DATE:  09/29/2024          INTERPRETATION:  CLINICAL INFORMATION: AMS, slumping to the right    COMPARISON: None.    CONTRAST:  IV Contrast: NONE  Complications: None reportedat time of study completion    TECHNIQUE:  Serial axial images were obtained from the skull base to the   vertex using multi-slice helical technique. Sagittal and coronal   reformats were obtained.    FINDINGS:  Examination limited due to patient motion.    VENTRICLES AND SULCI: Age appropriate involutional changes.  INTRA-AXIAL: No mass effect, acute hemorrhage, or midline shift.  There   are periventricular and subcortical white matter hypodensities,   consistent with microvascular type changes.  EXTRA-AXIAL: No mass or fluid collection. Basal cisterns are normal in   appearance.    VISUALIZED SINUSES:  Trace right maxillary sinus mucosal thickening.  TYMPANOMASTOID CAVITIES:  Clear.  VISUALIZED ORBITS: Bilateral lens replacement.  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 Hyperostosis frontalis interna.    MISCELLANEOUS: None.      IMPRESSION:  No acute intracranial hemorrhage, mass effect, or midline shift.       < from: MR Head No Cont (09.30.24 @ 22:24) >    ACC: [id number] EXAM:  MR BRAIN   ORDERED BY:  LEWIS MARTINEZ     PROCEDURE DATE:  09/30/2024          INTERPRETATION:  CLINICAL INDICATIONS: ams    COMPARISON: Head CT dated 9/29/2024    TECHNIQUE: MRI brain: Multiplanar, multisequence MR imaging of the brain   are obtained without the administration of intravenous Gadavist contrast.    FINDINGS:  There is no abnormal restricted diffusion to suggest acute infarction.   Scattered periventricular and subcortical white matter T2 /FLAIR   hyperintensities are seen without mass effect, nonspecific, likely   representing moderate to severe chronic microvascular changes. Normal T2   flow-voids are seen within  the intracranial vasculature. The lateral   ventricles and cortical sulci are age-appropriate in size and   configuration. There is no mass, mass effect, or extra-axial fluid   collection.    Multiple small susceptibility artifacts in left cerebellum, bilateral   occipital lobes, left worse than right, left parietal lobe, bilateral   frontallobes. Differential diagnosis includes cavernoma's, chronic   hypertensive microhemorrhages, amyloid angiopathy. Midline structures are   normal.    The patient is status post bilateral ocular lens replacement surgery.   Mild inflammatory mucosal changes are seen throughout the various   portions of the paranasal sinuses. The orbits and mastoid air cells are   unremarkable.    IMPRESSION: No acute infarction.    --- End of Report ---            BARBARA HURLEY MD; Attending Radiologist  This document has been electronically signed. Sep 30 2024 10:39PM    < end of copied text >    < end of copied text >

## 2024-10-11 NOTE — CONSULT NOTE ADULT - ASSESSMENT
84 y/o F with dementia but reportedly A&Ox3 at baseline, presenting for AMS of 1 week duration.  over the last week there has been rapid decline.  patient normally AAOx3 and completes here own ADLs. over the last week cant walk, confused, and unable to do ADLs    CTH neg    MRI brain with no acute fdinigsn.  non specific FLAIR changes likely microvascular.  multiple small susceptibility artifact c/f cavernoma vs CAA.     b12 358  TSH WNL     Impression:   1) dementia possible underlying cerebral amyloid angiopathy.  given relative rapid decline need to also consider rapidly progressive neurodegnerative condidtions such as CJD  2) AMS, worse than baseline.  relatively rapid decline     - low/nomral b12. would supplement   - check RPR  - for dementia c/w donepezil 5mg QHS    - EEG pending   - consider MRI brain with gado  - if not near her baseline and no other source found would consider LP with routine studies, glucose, protein, cell count, flow, cytology, viral panel, AIE panel, paraneoplastic panel, 14-3-3 protein and RT quic    - PT/OT  - delirium precuations   - check FS, glucose control <180  - GI/DVT ppx  - Counseling on diet, exercise, and medication adherence was done  - Counseling on smoking cessation and alcohol consumption offered when appropriate.  - Pain assessed and judicious use of narcotics when appropriate was discussed.    - Stroke education given when appropriate.  - Importance of fall prevention discussed.   - Differential diagnosis and plan of care discussed with patient and/or family and primary team  - Thank you for allowing me to participate in the care of this patient. Call with questions.   Jordan Castañeda MD  Vascular Neurology  Office: [phone] 
A/P:  84 y/o F with PMHx of dementia A&Ox3 at baseline, presenting for AMS of 1 week duration. Patient is A&Ox0 at bedside so all collateral information has been gathered from her son and charts. A per son, patient resides at [facility] in [place]. Son reports that in the past week, pt has had a rapid decline in her mental status. She is normally orientated x 3, able to converse and complete all ADLs like cooking, bathing, dressing herself. Over the past 4-5 days, pt has become mroe disoriented, unable to walk, unable to feed herself, and has fallen twice. She presented at [facility] on Wednesday s/p fall where workup was unremarkable (CT Head, X-Rays, UA, CBC, Troponin). Over the past three days, decline has continued and she is now slumped on her R side. She does not report pain, no chest pain/pressure, no shortness of breath, no dsyuria.  Wound Consult requested to assist w/ management of    BLE elevation & Compression  R hand - apply cavilon to skin tear  Moisturize intact skin w/ SWEEN cream BID  Inadequate PO intake, & Increased nutritional needs  encourage high quality protein, shireen/ prosource, MVI & Vit C to promote wound healing  Continue turning and positioning w/ offloading assistive devices as per protocol  Continue w/ attends under pads and Pericare w/ madden/ condom cath maintenance / purewick care as per protocol  Waffle Cushion to chair when oob to chair  Continue w/ low air loss pressure redistribution bed surface   Pt will need Group 2 mattress on hospital bed and ROHO cushion for wheel chair upon discharge home  Care as per medicine, will follow w/ you/ remain available as requested  Upon discharge f/u as outpatient at [facility] [address] [facility] [phone]  Seen w/ attng & RN and D/w team & RN  Thank you for this consult  Laura Paredes M.D., wound care fellow  Nights/ Weekends/ Holidays please call:  General Surgery Consult pager ([phone]) for emergencies  Wound PT for multilayer leg wrapping or VAC issues (x [phone])

## 2024-10-12 LAB
ANION GAP SERPL CALC-SCNC: 13 MMOL/L — SIGNIFICANT CHANGE UP (ref 5–17)
BUN SERPL-MCNC: 24 MG/DL — HIGH (ref 7–23)
CALCIUM SERPL-MCNC: 9.2 MG/DL — SIGNIFICANT CHANGE UP (ref 8.4–10.5)
CHLORIDE SERPL-SCNC: 106 MMOL/L — SIGNIFICANT CHANGE UP (ref 96–108)
CO2 SERPL-SCNC: 25 MMOL/L — SIGNIFICANT CHANGE UP (ref 22–31)
CREAT SERPL-MCNC: 1.03 MG/DL — SIGNIFICANT CHANGE UP (ref 0.5–1.3)
EGFR: 54 ML/MIN/1.73M2 — LOW
GLUCOSE SERPL-MCNC: 75 MG/DL — SIGNIFICANT CHANGE UP (ref 70–99)
HCT VFR BLD CALC: 38.6 % — SIGNIFICANT CHANGE UP (ref 34.5–45)
HGB BLD-MCNC: 11.9 G/DL — SIGNIFICANT CHANGE UP (ref 11.5–15.5)
MCHC RBC-ENTMCNC: 26.9 PG — LOW (ref 27–34)
MCHC RBC-ENTMCNC: 30.8 GM/DL — LOW (ref 32–36)
MCV RBC AUTO: 87.3 FL — SIGNIFICANT CHANGE UP (ref 80–100)
NRBC # BLD: 0 /100 WBCS — SIGNIFICANT CHANGE UP (ref 0–0)
PLATELET # BLD AUTO: 360 K/UL — SIGNIFICANT CHANGE UP (ref 150–400)
POTASSIUM SERPL-MCNC: 4 MMOL/L — SIGNIFICANT CHANGE UP (ref 3.5–5.3)
POTASSIUM SERPL-SCNC: 4 MMOL/L — SIGNIFICANT CHANGE UP (ref 3.5–5.3)
RBC # BLD: 4.42 M/UL — SIGNIFICANT CHANGE UP (ref 3.8–5.2)
RBC # FLD: 14.7 % — HIGH (ref 10.3–14.5)
SODIUM SERPL-SCNC: 144 MMOL/L — SIGNIFICANT CHANGE UP (ref 135–145)
WBC # BLD: 8.84 K/UL — SIGNIFICANT CHANGE UP (ref 3.8–10.5)
WBC # FLD AUTO: 8.84 K/UL — SIGNIFICANT CHANGE UP (ref 3.8–10.5)

## 2024-10-12 PROCEDURE — 99233 SBSQ HOSP IP/OBS HIGH 50: CPT

## 2024-10-12 RX ADMIN — HYDRALAZINE HYDROCHLORIDE 10 MILLIGRAM(S): 100 TABLET ORAL at 17:45

## 2024-10-12 RX ADMIN — ROSUVASTATIN CALCIUM 5 MILLIGRAM(S): 20 TABLET, COATED ORAL at 21:23

## 2024-10-12 RX ADMIN — Medication 81 MILLIGRAM(S): at 12:51

## 2024-10-12 RX ADMIN — Medication 1000 MICROGRAM(S): at 12:51

## 2024-10-12 RX ADMIN — Medication 1 APPLICATION(S): at 13:00

## 2024-10-12 RX ADMIN — Medication 100 MILLIGRAM(S): at 12:51

## 2024-10-12 RX ADMIN — CETIRIZINE HYDROCHLORIDE 10 MILLIGRAM(S): 10 TABLET ORAL at 12:50

## 2024-10-12 RX ADMIN — HYDRALAZINE HYDROCHLORIDE 10 MILLIGRAM(S): 100 TABLET ORAL at 05:29

## 2024-10-12 RX ADMIN — Medication 5 MILLIGRAM(S): at 21:23

## 2024-10-12 RX ADMIN — MEMANTINE 5 MILLIGRAM(S): 10 TABLET ORAL at 12:50

## 2024-10-12 RX ADMIN — FOLIC ACID 1 MILLIGRAM(S): 1 TABLET ORAL at 12:51

## 2024-10-12 NOTE — PROGRESS NOTE ADULT - NSPROGADDITIONALINFOA_GEN_ALL_CORE
Tried to call family at [phone]--> going to voicemail. Island Pedicle Flap Text: The defect edges were debeveled with a #15 scalpel blade.  Given the location of the defect, shape of the defect and the proximity to free margins an island pedicle advancement flap was deemed most appropriate.  Using a sterile surgical marker, an appropriate advancement flap was drawn incorporating the defect, outlining the appropriate donor tissue and placing the expected incisions within the relaxed skin tension lines where possible.    The area thus outlined was incised deep to adipose tissue with a #15 scalpel blade.  The skin margins were undermined to an appropriate distance in all directions around the primary defect and laterally outward around the island pedicle utilizing iris scissors.  There was minimal undermining beneath the pedicle flap.

## 2024-10-13 PROCEDURE — 99232 SBSQ HOSP IP/OBS MODERATE 35: CPT

## 2024-10-13 RX ADMIN — Medication 1000 MICROGRAM(S): at 11:31

## 2024-10-13 RX ADMIN — MEMANTINE 5 MILLIGRAM(S): 10 TABLET ORAL at 11:31

## 2024-10-13 RX ADMIN — Medication 5 MILLIGRAM(S): at 22:32

## 2024-10-13 RX ADMIN — HYDRALAZINE HYDROCHLORIDE 10 MILLIGRAM(S): 100 TABLET ORAL at 17:29

## 2024-10-13 RX ADMIN — CETIRIZINE HYDROCHLORIDE 10 MILLIGRAM(S): 10 TABLET ORAL at 11:31

## 2024-10-13 RX ADMIN — HYDRALAZINE HYDROCHLORIDE 10 MILLIGRAM(S): 100 TABLET ORAL at 05:09

## 2024-10-13 RX ADMIN — ROSUVASTATIN CALCIUM 5 MILLIGRAM(S): 20 TABLET, COATED ORAL at 22:32

## 2024-10-13 RX ADMIN — Medication 81 MILLIGRAM(S): at 11:31

## 2024-10-13 RX ADMIN — Medication 1 APPLICATION(S): at 11:31

## 2024-10-13 RX ADMIN — Medication 650 MILLIGRAM(S): at 22:32

## 2024-10-13 RX ADMIN — Medication 100 MILLIGRAM(S): at 11:31

## 2024-10-13 RX ADMIN — Medication 3 MILLIGRAM(S): at 22:32

## 2024-10-13 RX ADMIN — FOLIC ACID 1 MILLIGRAM(S): 1 TABLET ORAL at 11:31

## 2024-10-13 NOTE — PROGRESS NOTE ADULT - PROBLEM SELECTOR PLAN 2
Pt with 2 falls in the past week. Last fall 9/25 with strike to L shoulder and L hip. CTH non con negative, EKG NSR, Trops negative x 2    DDx: mechanical fall vs physical debility vs seizure vs arrhythmogenic cause    Plan:  - Orthostatic vitals once patient more alert and oriented  - Pain control as needed with Tylenol, lidocaine patches.  - PT evaluation  - Fall precaution.
Pt with 2 falls in the past week. Last fall 9/25 with strike to L shoulder and L hip. CTH non con negative, EKG NSR, Trops negative x 2.     DDx: mechanical fall vs physical debility vs seizure vs arrhythmogenic cause    Plan:  - Orthostatic vitals once patient more alert and oriented  - Pain control as needed with Tylenol, lidocaine patches  - PT eval   - Fall precaution
Pt with 2 falls in the past week. Last fall 9/25 with strike to L shoulder and L hip. CTH non con negative, EKG NSR, Trops negative x 2.     DDx: mechanical fall vs physical debility vs seizure vs arrhythmogenic cause    Plan:  - Orthostatic vitals once patient more alert and oriented  - Pain control as needed with Tylenol, lidocaine patches.  - PT eval   - Fall precaution.
Pt with 2 falls in the past week. Last fall 9/25 with strike to L shoulder and L hip. CTH non con negative, EKG NSR, Trops negative x 2.     DDx: mechanical fall vs physical debility vs seizure vs arrhythmogenic cause    Plan:  - Orthostatic vitals once patient more alert and oriented  - Pain control as needed with Tylenol, lidocaine patches  - PT eval   - Fall precaution
Pt with 2 falls in the past week. Last fall 9/25 with strike to L shoulder and L hip. CTH non con negative, EKG NSR, Trops negative x 2    DDx: mechanical fall vs physical debility vs seizure vs arrhythmogenic cause    Plan:  - Orthostatic vitals once patient more alert and oriented  - Pain control as needed with Tylenol, lidocaine patches.  - PT evaluation  - Fall precaution
Pt with 2 falls in the past week. Last fall 9/25 with strike to L shoulder and L hip. CTH non con negative, EKG NSR, Trops negative x 2    DDx: mechanical fall vs physical debility vs seizure vs arrhythmogenic cause    Plan:  - Orthostatic vitals once patient more alert and oriented  - Pain control as needed with Tylenol, lidocaine patches.  - PT consulted, recs appreciated.   - Fall precaution.
Pt with 2 falls in the past week. Last fall 9/25 with strike to L shoulder and L hip. CTH non con negative, EKG NSR, Trops negative x 2.     DDx: mechanical fall vs physical debility vs seizure vs arrhythmogenic cause    Plan:  - Orthostatic vitals once patient more alert and oriented  - Pain control as needed with Tylenol, lidocaine patches.  - PT evaluation  - Fall precaution
Pt with 2 falls in the past week. Last fall 9/25 with strike to L shoulder and L hip. CTH non con negative, EKG NSR, Trops negative x 2.     DDx: mechanical fall vs physical debility vs seizure vs arrhythmogenic cause    Plan:  - Orthostatic vitals once patient more alert and oriented  - Pain control as needed with Tylenol, lidocaine patches  - PT eval   - Fall precaution.
Pt with 2 falls in the past week. Last fall 9/25 with strike to L shoulder and L hip. CTH non con negative, EKG NSR, Trops negative x 2.     DDx: mechanical fall vs physical debility vs seizure vs arrhythmogenic cause    Plan:  - Orthostatic vitals once patient more alert and oriented  - Pain control as needed with Tylenol, lidocaine patches  - PT eval   - Fall precaution
Pt with 2 falls in the past week. Last fall 9/25 with strike to L shoulder and L hip. CTH non con negative, EKG NSR, Trops negative x 2.     DDx: mechanical fall vs physical debility vs seizure vs arrhythmogenic cause    Plan:  - Orthostatic vitals once patient more alert and oriented  - Pain control as needed with Tylenol, lidocaine patches  - PT eval   - Fall precaution
Pt with 2 falls in the past week. Last fall 9/25 with strike to L shoulder and L hip. CTH non con negative, EKG NSR, Trops negative x 2    DDx: mechanical fall vs physical debility vs seizure vs arrhythmogenic cause    Plan:  - Orthostatic vitals once patient more alert and oriented  - Pain control as needed with Tylenol, lidocaine patches.  - PT evaluation  - Fall precaution
Pt with 2 falls in the past week. Last fall 9/25 with strike to L shoulder and L hip. CTH non con negative, EKG NSR, Trops negative x 2.     DDx: mechanical fall vs physical debility vs seizure vs arrhythmogenic cause    Plan:  - Orthostatic vitals once patient more alert and oriented  - Pain control as needed with Tylenol, lidocaine patches  - PT eval   - Fall precaution

## 2024-10-13 NOTE — PROGRESS NOTE ADULT - PROBLEM SELECTOR PROBLEM 2
Recurrent falls

## 2024-10-13 NOTE — PROGRESS NOTE ADULT - PROBLEM SELECTOR PROBLEM 1
Altered mental status
Metabolic encephalopathy
Metabolic encephalopathy
Altered mental status
Altered mental status
Metabolic encephalopathy
Altered mental status

## 2024-10-13 NOTE — PROGRESS NOTE ADULT - PROBLEM SELECTOR PROBLEM 3
Medication management

## 2024-10-13 NOTE — PROGRESS NOTE ADULT - PROBLEM SELECTOR PLAN 1
Pt with hx of dementia on Donepezil and Namenda with rapid decline in mentation / ADLs in the past week as per son. Patient baseline is A&Ox3 last known normal mentation was Wednesday 9/25. Infectious workup (UA, RVP) negative so far. Lungs clear and CTH negative for acute process    DDx: infectious process vs progression of dementia vs traumatic injury    Plan:   - B12 slightly low, will supplement  - MRI Head without acute infarct  - vEEG-no seizure activity  - Neuro consulted, follow up recs-will attempt LP under anesthesia 10/4, Lp labs ordered  - CTM fever curve and WBC count  - Aspiration precaution
Pt with hx of dementia on Donepezil and Namenda with rapid decline in mentation / ADLs in the past week as per son. Patient baseline is A&Ox3 last known normal mentation was Wednesday 9/25. Infectious workup (UA, RVP) negative so far. Lungs clear and CTH negative for acute process    DDx: infectious process vs progression of dementia vs traumatic injury    Plan:   - B12 slightly low, will supplement  - MRI Head without acute infarct  - vEEG-no seizure activity  - Neuro consulted, follow up recs-will attempt LP under anesthesia 10/4  - CTM fever curve and WBC count  - Aspiration precaution
Pt with hx of dementia on Donepezil and Namenda with rapid decline in mentation / ADLs in the past week as per son. Patient baseline is A&Ox3 last known normal mentation was Wednesday 9/25. Infectious workup (UA, RVP) negative so far. Lungs clear and CTH negative for acute process    DDx: infectious process vs progression of dementia vs traumatic injury    Plan:   - F/u UCx  - F/u B12, Folate, TSH, T4, thiamine  - MRI Head pending  - vEEG Pending  - Possible neurology consult if AMS persists and no underlying cause found  - CTM fever curve and WBC count  - Aspiration precaution
Pt with hx of dementia on Donepezil and Namenda with rapid decline in mentation / ADLs in the past week as per son. Patient baseline is A&Ox3 last known normal mentation was Wednesday 9/25. Infectious workup (UA, RVP) negative so far. Lungs clear and CTH negative for acute process    DDx: infectious process vs progression of dementia vs traumatic injury    Plan:   - B12 slightly low, will supplement  - MRI Head 9/30 without acute infarct or hemorrhage. Multiple small susceptibility artifacts in left cerebellum, bilateral occipital lobes, left worse than right, left parietal lobe, bilateral frontal lobes. Differential diagnosis includes cavernoma's, chronic hypertensive microhemorrhages, amyloid angiopathy. Midline structures are normal.  - vEEG-no seizure activity  - CTM fever curve and WBC count  - Aspiration precaution  - Neuro consulted, follow up recs- LP was planned on 10/4, Lp labs ordered. Unfortunately INR above 2 though no elevations in LFT. Will give vitamin K 5 mg PO x1 and repeat PT/ INR, PTT over the weekend.   - LP labs per neuro glucose, protein, cell count, flow, cytology, viral panel, AIE panel, paraneoplastic panel, 14-3-3 protein and RT quic ordered   - Repeat CTH 10/6 for lethargy showing no hemorrhage.    LP performed. Pending rest of results. Crytococcal ag negative, Neutrophils wnl.  Plan for eventual discharge to [facility].   Unclear how much of confusion is also associated with hospital delirium.   Neurology expressed that LP results may take up to a week to finalize. Son expresses worry that patient cannot have quick followup from [facility] in time.
Pt with hx of dementia on Donepezil and Namenda with rapid decline in mentation / ADLs in the past week as per son. Patient baseline is A&Ox3 last known normal mentation was Wednesday 9/25. Infectious workup (UA, RVP) negative so far. Lungs clear and CTH negative for acute process    DDx: infectious process vs progression of dementia vs traumatic injury    Plan:   - B12 slightly low, will supplement  - MRI Head 9/30 without acute infarct or hemorrhage. Multiple small susceptibility artifacts in left cerebellum, bilateral occipital lobes, left worse than right, left parietal lobe, bilateral frontal lobes. Differential diagnosis includes cavernoma's, chronic hypertensive microhemorrhages, amyloid angiopathy. Midline structures are normal.  - vEEG-no seizure activity  - CTM fever curve and WBC count  - Aspiration precaution  - Neuro consulted, follow up recs- will attempt LP under anesthesia 10/4, Lp labs ordered. Unfortunately INR above 2 though no elevations in LFT. Will give vitamin K 5 mg PO x1 and repeat PT/ INR, PTT over the weekend. If still elevated 10/5- will consult heme. Plan for LP on Monday 10/7. Discussed with Neuroradiology Dr. Munira Ansari  - LP labs per neuro glucose, protein, cell count, flow, cytology, viral panel, AIE panel, paraneoplastic panel, 14-3-3 protein and RT quic ordered   - Repeat CTH 10/6 for lethargy showing no hemorrhage
Pt with hx of dementia on Donepezil and Namenda with rapid decline in mentation / ADLs in the past week as per son. Patient baseline is A&Ox3 last known normal mentation was Wednesday 9/25. Infectious workup (UA, RVP) negative so far. Lungs clear and CTH negative for acute process    DDx: infectious process vs progression of dementia vs traumatic injury    Plan:   - B12 slightly low, will supplement  - MRI Head 9/30 without acute infarct or hemorrhage. Multiple small susceptibility artifacts in left cerebellum, bilateral occipital lobes, left worse than right, left parietal lobe, bilateral frontal lobes. Differential diagnosis includes cavernoma's, chronic hypertensive microhemorrhages, amyloid angiopathy. Midline structures are normal.  - vEEG-no seizure activity  - CTM fever curve and WBC count  - Aspiration precaution  - Neuro consulted, follow up recs- LP was planned on 10/4, Lp labs ordered. Unfortunately INR above 2 though no elevations in LFT. Will give vitamin K 5 mg PO x1 and repeat PT/ INR, PTT over the weekend.   - LP labs per neuro glucose, protein, cell count, flow, cytology, viral panel, AIE panel, paraneoplastic panel, 14-3-3 protein and RT quic ordered   - Repeat CTH 10/6 for lethargy showing no hemorrhage.    LP performed. Pending rest of results. Crytococcal ag negative, Neutrophils wnl.  Plan for discharge Monday to [facility].
Pt with hx of dementia on Donepezil and Namenda with rapid decline in mentation / ADLs in the past week as per son. Patient baseline is A&Ox3 last known normal mentation was Wednesday 9/25. Infectious workup (UA, RVP) negative so far. Lungs clear and CTH negative for acute process    DDx: infectious process vs progression of dementia vs traumatic injury    Plan:   - B12 slightly low, will supplement  - MRI Head 9/30 without acute infarct or hemorrhage. Multiple small susceptibility artifacts in left cerebellum, bilateral occipital lobes, left worse than right, left parietal lobe, bilateral frontal lobes. Differential diagnosis includes cavernoma's, chronic hypertensive microhemorrhages, amyloid angiopathy. Midline structures are normal.  - vEEG-no seizure activity  - CTM fever curve and WBC count  - Aspiration precaution  - Neuro consulted, follow up recs- will attempt LP under anesthesia 10/4, Lp labs ordered. Unfortunately INR above 2 though no elevations in LFT. Will give vitamin K 5 mg PO x1 and repeat PT/ INR, PTT over the weekend. If still elevated 10/5- will consult heme. Plan for LP on Monday 10/7. Discussed with Neuroradiology Dr. Munira Ansari  - LP labs per neuro glucose, protein, cell count, flow, cytology, viral panel, AIE panel, paraneoplastic panel, 14-3-3 protein and RT quic ordered   - Repeat CTH 10/6 for lethargy showing no hemorrhage.    Attempts made to push up time for LP however due to scheduling and need for anesthesia plan for LP to be done on 10/10.
Pt with hx of dementia on Donepezil and Namenda with rapid decline in mentation / ADLs in the past week as per son. Patient baseline is A&Ox3 last known normal mentation was Wednesday 9/25. Infectious workup (UA, RVP) negative so far. Lungs clear and CTH negative for acute process    DDx: infectious process vs progression of dementia vs traumatic injury    Plan:   - B12 slightly low, will supplement  - MRI Head 9/30 without acute infarct or hemorrhage. Multiple small susceptibility artifacts in left cerebellum, bilateral occipital lobes, left worse than right, left parietal lobe, bilateral frontal lobes. Differential diagnosis includes cavernoma's, chronic hypertensive microhemorrhages, amyloid angiopathy. Midline structures are normal.  - vEEG-no seizure activity  - CTM fever curve and WBC count  - Aspiration precaution  - Neuro consulted, follow up recs- will attempt LP under anesthesia 10/4, Lp labs ordered. Unfortunately INR above 2 though no elevations in LFT. Will give vitamin K 5 mg PO x1 and repeat PT/ INR, PTT over the weekend. If still elevated 10/5- will consult heme. Plan for LP on Monday 10/7. Discussed with Neuroradiology Dr. Munira Ansari  - LP labs per neuro glucose, protein, cell count, flow, cytology, viral panel, AIE panel, paraneoplastic panel, 14-3-3 protein and RT quic ordered   - Repeat CTH 10/6 for lethargy showing no hemorrhage.
Pt with hx of dementia on Donepezil and Namenda with rapid decline in mentation / ADLs in the past week as per son. Patient baseline is A&Ox3 last known normal mentation was Wednesday 9/25. Infectious workup (UA, RVP) negative so far. Lungs clear and CTH negative for acute process    DDx: infectious process vs progression of dementia vs traumatic injury    Plan:   - B12 slightly low, will supplement  - MRI Head 9/30 without acute infarct or hemorrhage. Multiple small susceptibility artifacts in left cerebellum, bilateral occipital lobes, left worse than right, left parietal lobe, bilateral frontal lobes. Differential diagnosis includes cavernoma's, chronic hypertensive microhemorrhages, amyloid angiopathy. Midline structures are normal.  - vEEG-no seizure activity  - CTM fever curve and WBC count  - Aspiration precaution  - Neuro consulted, follow up recs- LP was planned on 10/4, Lp labs ordered. Unfortunately INR above 2 though no elevations in LFT. Will give vitamin K 5 mg PO x1 and repeat PT/ INR, PTT over the weekend.   - LP labs per neuro glucose, protein, cell count, flow, cytology, viral panel, AIE panel, paraneoplastic panel, 14-3-3 protein and RT quic ordered   - Repeat CTH 10/6 for lethargy showing no hemorrhage.    LP planned for today. Will followup results.
Pt with hx of dementia on Donepezil and Namenda with rapid decline in mentation / ADLs in the past week as per son. Patient baseline is A&Ox3 last known normal mentation was Wednesday 9/25. Infectious workup (UA, RVP) negative so far. Lungs clear and CTH negative for acute process    DDx: infectious process vs progression of dementia vs traumatic injury    Plan:   - B12 slightly low, will supplement  - MRI Head 9/30 without acute infarct or hemorrhage. Multiple small susceptibility artifacts in left cerebellum, bilateral occipital lobes, left worse than right, left parietal lobe, bilateral frontal lobes. Differential diagnosis includes cavernoma's, chronic hypertensive microhemorrhages, amyloid angiopathy. Midline structures are normal.  - vEEG-no seizure activity  - CTM fever curve and WBC count  - Aspiration precaution  - Neuro consulted, follow up recs- will attempt LP under anesthesia 10/4, Lp labs ordered. Unfortunately INR above 2 though no elevations in LFT. Will give vitamin K 5 mg PO x1 and repeat PT/ INR, PTT over the weekend. If still elevated 10/5- will consult heme. Plan for LP on Monday 10/7. Discussed with Neuroradiology Dr. Munira Ansari  - LP labs per neuro glucose, protein, cell count, flow, cytology, viral panel, AIE panel, paraneoplastic panel, 14-3-3 protein and RT quic ordered   - Repeat CTH 10/6 for lethargy showing no hemorrhage
Pt with hx of dementia on Donepezil and Namenda with rapid decline in mentation / ADLs in the past week as per son. Patient baseline is A&Ox3 last known normal mentation was Wednesday 9/25. Infectious workup (UA, RVP) negative so far. Lungs clear and CTH negative for acute process    DDx: infectious process vs progression of dementia vs traumatic injury    Plan:   - B12 slightly low, will supplement  - MRI Head 9/30 without acute infarct or hemorrhage. Multiple small susceptibility artifacts in left cerebellum, bilateral occipital lobes, left worse than right, left parietal lobe, bilateral frontal lobes. Differential diagnosis includes cavernoma's, chronic hypertensive microhemorrhages, amyloid angiopathy. Midline structures are normal.  - vEEG-no seizure activity  - CTM fever curve and WBC count  - Aspiration precaution  - Neuro consulted, follow up recs- will attempt LP under anesthesia 10/4, Lp labs ordered. Unfortunately INR above 2 though no elevations in LFT. Will give vitamin K 5 mg PO x1 and repeat PT/ INR, PTT over the weekend. If still elevated 10/5- will consult heme. Plan for LP on Monday 10/7. Discussed with Neuroradiology Dr. Munira Ansari
Pt with hx of dementia on Donepezil and Namenda with rapid decline in mentation / ADLs in the past week as per son. Patient baseline is A&Ox3 last known normal mentation was Wednesday 9/25. Infectious workup (UA, RVP) negative so far. Lungs clear and CTH negative for acute process    DDx: infectious process vs progression of dementia vs traumatic injury    Plan:   - B12 slightly low, will supplement  - MRI Head 9/30 without acute infarct or hemorrhage. Multiple small susceptibility artifacts in left cerebellum, bilateral occipital lobes, left worse than right, left parietal lobe, bilateral frontal lobes. Differential diagnosis includes cavernoma's, chronic hypertensive microhemorrhages, amyloid angiopathy. Midline structures are normal.  - vEEG-no seizure activity  - CTM fever curve and WBC count  - Aspiration precaution  - Neuro consulted, follow up recs- will attempt LP under anesthesia 10/4, Lp labs ordered. Unfortunately INR above 2 though no elevations in LFT. Will give vitamin K 5 mg PO x1 and repeat PT/ INR, PTT over the weekend. If still elevated 10/5- will consult heme. Plan for LP on Monday 10/7. Discussed with Neuroradiology Dr. Munira Ansari  - LP labs per neuro glucose, protein, cell count, flow, cytology, viral panel, AIE panel, paraneoplastic panel, 14-3-3 protein and RT quic
Pt with hx of dementia on Donepezil and Namenda with rapid decline in mentation / ADLs in the past week as per son. Patient baseline is A&Ox3 last known normal mentation was Wednesday 9/25. Infectious workup (UA, RVP) negative so far. Lungs clear and CTH negative for acute process    DDx: infectious process vs progression of dementia vs traumatic injury    Plan:   - B12 slightly low, will supplement  - MRI Head 9/30 without acute infarct or hemorrhage. Multiple small susceptibility artifacts in left cerebellum, bilateral occipital lobes, left worse than right, left parietal lobe, bilateral frontal lobes. Differential diagnosis includes cavernoma's, chronic hypertensive microhemorrhages, amyloid angiopathy. Midline structures are normal.  - vEEG-no seizure activity  - CTM fever curve and WBC count  - Aspiration precaution  - Neuro consulted, follow up recs- LP was planned on 10/4, Lp labs ordered. Unfortunately INR above 2 though no elevations in LFT. Will give vitamin K 5 mg PO x1 and repeat PT/ INR, PTT over the weekend.   - LP labs per neuro glucose, protein, cell count, flow, cytology, viral panel, AIE panel, paraneoplastic panel, 14-3-3 protein and RT quic ordered   - Repeat CTH 10/6 for lethargy showing no hemorrhage.    LP performed. Pending rest of results. Crytococcal ag negative, Neutrophils wnl.
Pt with hx of dementia on Donepezil and Namenda with rapid decline in mentation / ADLs in the past week as per son. Patient baseline is A&Ox3 last known normal mentation was Wednesday 9/25. Infectious workup (UA, RVP) negative so far. Lungs clear and CTH negative for acute process    DDx: infectious process vs progression of dementia vs traumatic injury    Plan:   - F/u UCx  - B12 slightly low, will supplement  - MRI Head withou acute infarct  - vEEG Pending  - Neuro consulted, follow up recs  - CTM fever curve and WBC count  - Aspiration precaution

## 2024-10-13 NOTE — PROGRESS NOTE ADULT - PROBLEM SELECTOR PROBLEM 4
Department of Anesthesiology  Postprocedure Note    Patient: Kenia Laboy  MRN: [id number]  YOB: 1964  Date of evaluation: 5/29/2024    Procedure Summary       Date: 05/29/24 Room / Location: [place] [address] / [facility]    Anesthesia Start: 0834 Anesthesia Stop: 0906    Procedures:       ENDOSCOPIC RETROGRADE CHOLANGIOPANCREATOGRAPHY SPHINCTER/PAPILLOTOMY      ENDOSCOPIC RETROGRADE CHOLANGIOPANCREATOGRAPHY balloon sweep Diagnosis:       Abdominal pain, left lower quadrant      Sphincter of Oddi dysfunction      (Abdominal pain, left lower quadrant [R10.32])      (Sphincter of Oddi dysfunction [K83.4])    Surgeons: Maykel Hammer MD Responsible Provider: Andres Love MD    Anesthesia Type: general ASA Status: 2            Anesthesia Type: General    Bobbi Phase I: Bobbi Score: 10    Bobbi Phase II: Bobbi Score: 10    Anesthesia Post Evaluation    Patient location during evaluation: PACU  Patient participation: complete - patient participated  Level of consciousness: awake  Airway patency: patent  Nausea & Vomiting: no nausea and no vomiting  Cardiovascular status: hemodynamically stable and blood pressure returned to baseline  Respiratory status: spontaneous ventilation, nonlabored ventilation and room air  Hydration status: stable  Comments: Appropriate for return to [facility]\"" for planned discharge home with .   Pain management: adequate    No notable events documented.  
Prophylactic measure

## 2024-10-13 NOTE — PROGRESS NOTE ADULT - PROBLEM SELECTOR PLAN 3
Med rec done with paperwork from facility  ASA 81mg daily  Hydralazine 10mg BID  Donepezil 5mg daily  Rosuvastatin 5mg daily  Folic acid 1mg daily  Vitamin B6 100mg daily  Memantine HCL ER 7mg  Loratadine 10mg daily,
Med rec done with paperwork from facility  ASA 81mg daily  Hydralazine 10mg BID  Donepezil 5mg daily  Rosuvastatin 5mg daily  Folic acid 1mg daily  Vitamin B6 100mg daily  Memantine HCL ER 7mg  Loratadine 10mg daily
Med rec done with paperwork from facility  ASA 81mg daily  Hydralazine 10mg BID  Donepezil 5mg daily  Rosuvastatin 5mg daily  Folic acid 1mg daily  Vitamin B6 100mg daily  Memantine HCL ER 7mg  Loratadine 10mg daily.
Med rec done with paperwork from facility  ASA 81mg daily  Hydralazine 10mg BID  Donepezil 5mg daily  Rosuvastatin 5mg daily  Folic acid 1mg daily  Vitamin B6 100mg daily  Memantine HCL ER 7mg  Loratadine 10mg daily
Med rec done with paperwork from facility  ASA 81mg daily  Hydralazine 10mg BID  Donepezil 5mg daily  Rosuvastatin 5mg daily  Folic acid 1mg daily  Vitamin B6 100mg daily  Memantine HCL ER 7mg  Loratadine 10mg daily.
Med rec done with paperwork from facility  ASA 81mg daily  Hydralazine 10mg BID  Donepezil 5mg daily  Rosuvastatin 5mg daily  Folic acid 1mg daily  Vitamin B6 100mg daily  Memantine HCL ER 7mg  Loratadine 10mg daily
Med rec done with paperwork from facility  ASA 81mg daily  Hydralazine 10mg BID  Donepezil 5mg daily  Rosuvastatin 5mg daily  Folic acid 1mg daily  Vitamin B6 100mg daily  Memantine HCL ER 7mg  Loratadine 10mg daily..

## 2024-10-14 ENCOUNTER — TRANSCRIPTION ENCOUNTER (OUTPATIENT)
Age: 84
End: 2024-10-14

## 2024-10-14 VITALS
OXYGEN SATURATION: 97 % | DIASTOLIC BLOOD PRESSURE: 87 MMHG | SYSTOLIC BLOOD PRESSURE: 136 MMHG | TEMPERATURE: 98 F | HEART RATE: 66 BPM | RESPIRATION RATE: 18 BRPM

## 2024-10-14 LAB
ANION GAP SERPL CALC-SCNC: 12 MMOL/L — SIGNIFICANT CHANGE UP (ref 5–17)
BASOPHILS # BLD AUTO: 0.05 K/UL — SIGNIFICANT CHANGE UP (ref 0–0.2)
BASOPHILS NFR BLD AUTO: 0.7 % — SIGNIFICANT CHANGE UP (ref 0–2)
BUN SERPL-MCNC: 23 MG/DL — SIGNIFICANT CHANGE UP (ref 7–23)
CALCIUM SERPL-MCNC: 9.1 MG/DL — SIGNIFICANT CHANGE UP (ref 8.4–10.5)
CHLORIDE SERPL-SCNC: 107 MMOL/L — SIGNIFICANT CHANGE UP (ref 96–108)
CO2 SERPL-SCNC: 21 MMOL/L — LOW (ref 22–31)
CREAT SERPL-MCNC: 1.1 MG/DL — SIGNIFICANT CHANGE UP (ref 0.5–1.3)
EBV PCR: SIGNIFICANT CHANGE UP IU/ML
EGFR: 50 ML/MIN/1.73M2 — LOW
EOSINOPHIL # BLD AUTO: 0.56 K/UL — HIGH (ref 0–0.5)
EOSINOPHIL NFR BLD AUTO: 7.8 % — HIGH (ref 0–6)
GLUCOSE SERPL-MCNC: 71 MG/DL — SIGNIFICANT CHANGE UP (ref 70–99)
HCT VFR BLD CALC: 39.8 % — SIGNIFICANT CHANGE UP (ref 34.5–45)
HGB BLD-MCNC: 12.2 G/DL — SIGNIFICANT CHANGE UP (ref 11.5–15.5)
IMM GRANULOCYTES NFR BLD AUTO: 0.4 % — SIGNIFICANT CHANGE UP (ref 0–0.9)
JCPYV DNA # CSF NAA+PROBE: SIGNIFICANT CHANGE UP COPIES/ML
LYMPHOCYTES # BLD AUTO: 1.61 K/UL — SIGNIFICANT CHANGE UP (ref 1–3.3)
LYMPHOCYTES # BLD AUTO: 22.5 % — SIGNIFICANT CHANGE UP (ref 13–44)
MCHC RBC-ENTMCNC: 27.3 PG — SIGNIFICANT CHANGE UP (ref 27–34)
MCHC RBC-ENTMCNC: 30.7 GM/DL — LOW (ref 32–36)
MCV RBC AUTO: 89 FL — SIGNIFICANT CHANGE UP (ref 80–100)
MONOCYTES # BLD AUTO: 0.86 K/UL — SIGNIFICANT CHANGE UP (ref 0–0.9)
MONOCYTES NFR BLD AUTO: 12 % — SIGNIFICANT CHANGE UP (ref 2–14)
NEUTROPHILS # BLD AUTO: 4.04 K/UL — SIGNIFICANT CHANGE UP (ref 1.8–7.4)
NEUTROPHILS NFR BLD AUTO: 56.6 % — SIGNIFICANT CHANGE UP (ref 43–77)
NRBC # BLD: 0 /100 WBCS — SIGNIFICANT CHANGE UP (ref 0–0)
PLATELET # BLD AUTO: 340 K/UL — SIGNIFICANT CHANGE UP (ref 150–400)
POTASSIUM SERPL-MCNC: 4.9 MMOL/L — SIGNIFICANT CHANGE UP (ref 3.5–5.3)
POTASSIUM SERPL-SCNC: 4.9 MMOL/L — SIGNIFICANT CHANGE UP (ref 3.5–5.3)
RBC # BLD: 4.47 M/UL — SIGNIFICANT CHANGE UP (ref 3.8–5.2)
RBC # FLD: 14.7 % — HIGH (ref 10.3–14.5)
SODIUM SERPL-SCNC: 140 MMOL/L — SIGNIFICANT CHANGE UP (ref 135–145)
WBC # BLD: 7.15 K/UL — SIGNIFICANT CHANGE UP (ref 3.8–10.5)
WBC # FLD AUTO: 7.15 K/UL — SIGNIFICANT CHANGE UP (ref 3.8–10.5)

## 2024-10-14 PROCEDURE — 87205 SMEAR GRAM STAIN: CPT

## 2024-10-14 PROCEDURE — 87086 URINE CULTURE/COLONY COUNT: CPT

## 2024-10-14 PROCEDURE — 87070 CULTURE OTHR SPECIMN AEROBIC: CPT

## 2024-10-14 PROCEDURE — 70450 CT HEAD/BRAIN W/O DYE: CPT | Mod: MC

## 2024-10-14 PROCEDURE — 85018 HEMOGLOBIN: CPT

## 2024-10-14 PROCEDURE — 84100 ASSAY OF PHOSPHORUS: CPT

## 2024-10-14 PROCEDURE — 36415 COLL VENOUS BLD VENIPUNCTURE: CPT

## 2024-10-14 PROCEDURE — 70551 MRI BRAIN STEM W/O DYE: CPT | Mod: MC

## 2024-10-14 PROCEDURE — 89051 BODY FLUID CELL COUNT: CPT

## 2024-10-14 PROCEDURE — 97530 THERAPEUTIC ACTIVITIES: CPT

## 2024-10-14 PROCEDURE — 83605 ASSAY OF LACTIC ACID: CPT

## 2024-10-14 PROCEDURE — 83735 ASSAY OF MAGNESIUM: CPT

## 2024-10-14 PROCEDURE — 85027 COMPLETE CBC AUTOMATED: CPT

## 2024-10-14 PROCEDURE — 62328 DX LMBR SPI PNXR W/FLUOR/CT: CPT

## 2024-10-14 PROCEDURE — 84394 TOTAL TAU: CPT

## 2024-10-14 PROCEDURE — 85025 COMPLETE CBC W/AUTO DIFF WBC: CPT

## 2024-10-14 PROCEDURE — 97162 PT EVAL MOD COMPLEX 30 MIN: CPT

## 2024-10-14 PROCEDURE — 84157 ASSAY OF PROTEIN OTHER: CPT

## 2024-10-14 PROCEDURE — 85610 PROTHROMBIN TIME: CPT

## 2024-10-14 PROCEDURE — 82435 ASSAY OF BLOOD CHLORIDE: CPT

## 2024-10-14 PROCEDURE — 88185 FLOWCYTOMETRY/TC ADD-ON: CPT

## 2024-10-14 PROCEDURE — 86789 WEST NILE VIRUS ANTIBODY: CPT

## 2024-10-14 PROCEDURE — 82947 ASSAY GLUCOSE BLOOD QUANT: CPT

## 2024-10-14 PROCEDURE — 87799 DETECT AGENT NOS DNA QUANT: CPT

## 2024-10-14 PROCEDURE — 84295 ASSAY OF SERUM SODIUM: CPT

## 2024-10-14 PROCEDURE — 86780 TREPONEMA PALLIDUM: CPT

## 2024-10-14 PROCEDURE — 80053 COMPREHEN METABOLIC PANEL: CPT

## 2024-10-14 PROCEDURE — 84436 ASSAY OF TOTAL THYROXINE: CPT

## 2024-10-14 PROCEDURE — 88184 FLOWCYTOMETRY/ TC 1 MARKER: CPT

## 2024-10-14 PROCEDURE — 95714 VEEG EA 12-26 HR UNMNTR: CPT

## 2024-10-14 PROCEDURE — 85014 HEMATOCRIT: CPT

## 2024-10-14 PROCEDURE — 71045 X-RAY EXAM CHEST 1 VIEW: CPT

## 2024-10-14 PROCEDURE — 82803 BLOOD GASES ANY COMBINATION: CPT

## 2024-10-14 PROCEDURE — 87015 SPECIMEN INFECT AGNT CONCNTJ: CPT

## 2024-10-14 PROCEDURE — 0241U: CPT

## 2024-10-14 PROCEDURE — 87637 SARSCOV2&INF A&B&RSV AMP PRB: CPT

## 2024-10-14 PROCEDURE — 81001 URINALYSIS AUTO W/SCOPE: CPT

## 2024-10-14 PROCEDURE — 87483 CNS DNA AMP PROBE TYPE 12-25: CPT

## 2024-10-14 PROCEDURE — 84484 ASSAY OF TROPONIN QUANT: CPT

## 2024-10-14 PROCEDURE — 82962 GLUCOSE BLOOD TEST: CPT

## 2024-10-14 PROCEDURE — 84132 ASSAY OF SERUM POTASSIUM: CPT

## 2024-10-14 PROCEDURE — 85730 THROMBOPLASTIN TIME PARTIAL: CPT

## 2024-10-14 PROCEDURE — 88108 CYTOPATH CONCENTRATE TECH: CPT

## 2024-10-14 PROCEDURE — 82330 ASSAY OF CALCIUM: CPT

## 2024-10-14 PROCEDURE — 97110 THERAPEUTIC EXERCISES: CPT

## 2024-10-14 PROCEDURE — 99285 EMERGENCY DEPT VISIT HI MDM: CPT | Mod: 25

## 2024-10-14 PROCEDURE — 84443 ASSAY THYROID STIM HORMONE: CPT

## 2024-10-14 PROCEDURE — 82607 VITAMIN B-12: CPT

## 2024-10-14 PROCEDURE — 95700 EEG CONT REC W/VID EEG TECH: CPT

## 2024-10-14 PROCEDURE — 82746 ASSAY OF FOLIC ACID SERUM: CPT

## 2024-10-14 PROCEDURE — 82945 GLUCOSE OTHER FLUID: CPT

## 2024-10-14 PROCEDURE — 86403 PARTICLE AGGLUT ANTBDY SCRN: CPT

## 2024-10-14 PROCEDURE — 86255 FLUORESCENT ANTIBODY SCREEN: CPT

## 2024-10-14 PROCEDURE — 83520 IMMUNOASSAY QUANT NOS NONAB: CPT

## 2024-10-14 PROCEDURE — 86788 WEST NILE VIRUS AB IGM: CPT

## 2024-10-14 PROCEDURE — 99239 HOSP IP/OBS DSCHRG MGMT >30: CPT

## 2024-10-14 PROCEDURE — 95711 VEEG 2-12 HR UNMONITORED: CPT

## 2024-10-14 PROCEDURE — 80048 BASIC METABOLIC PNL TOTAL CA: CPT

## 2024-10-14 RX ORDER — FOLIC ACID 1 MG/1
1 TABLET ORAL
Qty: 0 | Refills: 0 | DISCHARGE
Start: 2024-10-14

## 2024-10-14 RX ORDER — PYRIDOXINE HCL (VITAMIN B6) 50 MG
1 TABLET ORAL
Qty: 0 | Refills: 0 | DISCHARGE
Start: 2024-10-14

## 2024-10-14 RX ORDER — DONEPEZIL HCL 10 MG
1 TABLET ORAL
Qty: 0 | Refills: 0 | DISCHARGE
Start: 2024-10-14

## 2024-10-14 RX ORDER — CYANOCOBALAMIN (VITAMIN B-12) 1000MCG/ML
1 VIAL (ML) INJECTION
Qty: 0 | Refills: 0 | DISCHARGE
Start: 2024-10-14

## 2024-10-14 RX ORDER — ROSUVASTATIN CALCIUM 20 MG/1
1 TABLET, COATED ORAL
Qty: 0 | Refills: 0 | DISCHARGE
Start: 2024-10-14

## 2024-10-14 RX ORDER — CETIRIZINE HYDROCHLORIDE 10 MG/1
1 TABLET ORAL
Qty: 0 | Refills: 0 | DISCHARGE
Start: 2024-10-14

## 2024-10-14 RX ORDER — MEMANTINE 10 MG/1
1 TABLET ORAL
Qty: 0 | Refills: 0 | DISCHARGE
Start: 2024-10-14

## 2024-10-14 RX ORDER — ACETAMINOPHEN 325 MG
2 TABLET ORAL
Qty: 0 | Refills: 0 | DISCHARGE
Start: 2024-10-14

## 2024-10-14 RX ORDER — PETROLATUM,WHITE
1 JELLY (GRAM) TOPICAL
Qty: 0 | Refills: 0 | DISCHARGE
Start: 2024-10-14

## 2024-10-14 RX ORDER — HYDRALAZINE HYDROCHLORIDE 100 MG/1
1 TABLET ORAL
Qty: 0 | Refills: 0 | DISCHARGE
Start: 2024-10-14

## 2024-10-14 RX ORDER — ASPIRIN 325 MG
1 TABLET ORAL
Qty: 0 | Refills: 0 | DISCHARGE
Start: 2024-10-14

## 2024-10-14 RX ORDER — MAG HYDROX/ALUMINUM HYD/SIMETH 200-200-20
30 SUSPENSION, ORAL (FINAL DOSE FORM) ORAL
Qty: 0 | Refills: 0 | DISCHARGE
Start: 2024-10-14

## 2024-10-14 RX ADMIN — FOLIC ACID 1 MILLIGRAM(S): 1 TABLET ORAL at 12:43

## 2024-10-14 RX ADMIN — MEMANTINE 5 MILLIGRAM(S): 10 TABLET ORAL at 12:44

## 2024-10-14 RX ADMIN — Medication 1000 MICROGRAM(S): at 12:44

## 2024-10-14 RX ADMIN — Medication 100 MILLIGRAM(S): at 12:43

## 2024-10-14 RX ADMIN — CETIRIZINE HYDROCHLORIDE 10 MILLIGRAM(S): 10 TABLET ORAL at 12:44

## 2024-10-14 RX ADMIN — Medication 81 MILLIGRAM(S): at 12:43

## 2024-10-14 RX ADMIN — HYDRALAZINE HYDROCHLORIDE 10 MILLIGRAM(S): 100 TABLET ORAL at 06:21

## 2024-10-14 RX ADMIN — Medication 1 APPLICATION(S): at 12:44

## 2024-10-14 NOTE — DISCHARGE NOTE NURSING/CASE MANAGEMENT/SOCIAL WORK - PATIENT PORTAL LINK FT
You can access the FollowMyHealth Patient Portal offered by [facility] by registering at the following website: [email or web address]. By joining [facility] FollowMyHealth portal, you will also be able to view your health information using other applications (apps) compatible with our system.

## 2024-10-14 NOTE — DISCHARGE NOTE PROVIDER - NSDCCPCAREPLAN_GEN_ALL_CORE_FT
PRINCIPAL DISCHARGE DIAGNOSIS  Diagnosis: Altered mental status  Assessment and Plan of Treatment: Patient with history of dementia ,   continue Donepezil    Continue Memantine HCL ER 7mg  CT of head negative for bleed  MRI negative for stroke   EEG no seizure activity      SECONDARY DISCHARGE DIAGNOSES  Diagnosis: Metabolic encephalopathy  Assessment and Plan of Treatment: CTH w/ no acute pathology , ekg nsr ; unclear etiology for acute decline in function , possible progression of dementia ,   Lumbar puncture: speciemen negative   UA positive was treated with Cefuroxine 500mg po x 2 dose and Ceftriaxone 1 gm x 3 days    Diagnosis: Recurrent falls  Assessment and Plan of Treatment: s/p fall 9/25  with injury to left shoulder and left hip  All imaging were negative for acute fracture   Pain medication as needed   EKG NSR, Trops negative x 2      Diagnosis: HTN (hypertension)  Assessment and Plan of Treatment: Follow up with your medical doctor to establish long term blood pressure treatment goals.  continue Hydralazine  10 mg po q 12 hours

## 2024-10-14 NOTE — PROGRESS NOTE ADULT - ASSESSMENT
82 y/o F with dementia but reportedly A&Ox3 at baseline, presenting for AMS of 1 week duration.  over the last week there has been rapid decline.  patient normally AAOx3 and completes here own ADLs. over the last week cant walk, confused, and unable to do ADLs   CTH neg   MRI brain with no acute findings.  non specific FLAIR changes likely microvascular.  multiple small susceptibility artifact c/f cavernoma vs CAA.    b12 358  TSH WNL   RPR neg   EEG no seizures mild mod dysfunction   MRI brain no acute findings.  does have multiple small susceptibility artifacts, L cerebellum B/L occipital L>R, L pareital and B/L frontal lobes possible CAA   spoke with shirin robertson 10/7 said up until 2-3 weeks ago at [facility] she was relatively independent a bit confused but had several falls and deteriorated   UA+    Impression:   1) dementia possible underlying cerebral amyloid angiopathy.  given relative rapid decline need to also consider rapidly progressive neurodegenerative conditions such as CJD  2) AMS, worse than baseline.  relatively rapid decline  --> seems to be likely related to CAA and "amyloid spells"     - CTX for + UA  - low/normal b12. would supplement   - for dementia c/w donepezil 5mg QHS    - consider MRI brain with gado next time   - if not near her baseline and no other source found would consider LP with routine studies, glucose, protein, cell count, flow, cytology, viral panel, AIE panel, paraneoplastic panel, 14-3-3 protein and RT quic  ; make sure IR knows CJD precuations ; LP plan for 10/7 with anestheisa?   - PT/OT  - delirium precuations   - check FS, glucose control <180  - GI/DVT ppx   - Thank you for allowing me to participate in the care of this patient. Call with questions.   - spoke with primary team   spoke with sister in law at bedside 10/7 and shirin Robertson (podiatrist) [phone]. gave shirin robertson update ~530PM    Jordan Castañeda MD  Vascular Neurology  Office: [phone] 
84 y/o F with PMHx of dementia A&Ox3 at baseline, presenting for AMS of 1 week duration and admitted for AMS workup
84 y/o F with dementia but reportedly A&Ox3 at baseline, presenting for AMS of 1 week duration.  over the last week there has been rapid decline.  patient normally AAOx3 and completes here own ADLs. over the last week cant walk, confused, and unable to do ADLs   CTH neg   MRI brain with no acute findings.  non specific FLAIR changes likely microvascular.  multiple small susceptibility artifact c/f cavernoma vs CAA.    b12 358  TSH WNL   RPR neg   EEG no seizures mild mod dysfunction   MRI brain no acute findings.  does have multiple small susceptibility artifacts, L cerebellum B/L occipital L>R, L pareital and B/L frontal lobes possible CAA   spoke with shirin robertson 10/7 said up until 2-3 weeks ago at [facility] she was relatively independent a bit confused but had several falls and deteriorated   UA+  CSF crypto neg; total cells < 1   Impression:   1) dementia possible underlying cerebral amyloid angiopathy.  given relative rapid decline need to also consider rapidly progressive neurodegenerative conditions such as CJD  2) AMS, worse than baseline.  relatively rapid decline  --> seems to be likely related to CAA and "amyloid spells"     - CTX for + UA; completed   - low/normal b12. would supplement   - for dementia c/w donepezil 5mg QHS    - consider MRI brain with gado next time   - if not near her baseline and no other source found would consider LP with routine studies, glucose, protein, cell count, flow, cytology, viral panel, AIE panel, paraneoplastic panel, 14-3-3 protein and RT quic  ; make sure IR knows CJD precuations ; LP plan for  thursday now with anestheisa; completed.  f/fu results so far not concerning   - PT/OT  - delirium precuations   - check FS, glucose control <180  - GI/DVT ppx   - Thank you for allowing me to participate in the care of this patient. Call with questions.   - spoke with primary team   spoke with sister in law at bedside 10/7 and shirin Robertson (podiatrist) [phone]. gave shirin robertson update ~530PM on 10/7     Jordan Castañeda MD  Vascular Neurology  Office: [phone] 
82 y/o F with PMHx of dementia A&Ox3 at baseline, presenting for AMS of 1 week duration and admitted for AMS workup. 
84 y/o F with PMHx of dementia A&Ox3 at baseline, presenting for AMS of 1 week duration and admitted for AMS workup. 
82 y/o F with PMHx of dementia A&Ox3 at baseline, presenting for AMS of 1 week duration and admitted for AMS workup.
82 y/o F with dementia but reportedly A&Ox3 at baseline, presenting for AMS of 1 week duration.  over the last week there has been rapid decline.  patient normally AAOx3 and completes here own ADLs. over the last week cant walk, confused, and unable to do ADLs    CTH neg    MRI brain with no acute fdinigsn.  non specific FLAIR changes likely microvascular.  multiple small susceptibility artifact c/f cavernoma vs CAA.     b12 358  TSH WNL   EEG no seiuzres. mild mod dysfunction   MRI brain no acute findings.  does have multiple small susceptibility artifacts, L cerebellum B/L ocipital L>R, L pareital and B/L frontal lobes possibel CAA     Impression:   1) dementia possible underlying cerebral amyloid angiopathy.  given relative rapid decline need to also consider rapidly progressive neurodegnerative condidtions such as CJD  2) AMS, worse than baseline.  relatively rapid decline  --> seems to be likely related to CAA and "amyloid spells"     - low/nomral b12. would supplement   - check RPR  - for dementia c/w donepezil 5mg QHS    - consider MRI brain with gado  - if not near her baseline and no other source found would consider LP with routine studies, glucose, protein, cell count, flow, cytology, viral panel, AIE panel, paraneoplastic panel, 14-3-3 protein and RT quic  ; make sure IR knows CJD precuations ; LP plan for 10/7   - PT/OT  - delirium precuations   - check FS, glucose control <180  - GI/DVT ppx   - Thank you for allowing me to participate in the care of this patient. Call with questions.   - spoke with primary team   Jordan Castañeda MD  Vascular Neurology  Office: [phone] 
84 y/o F with dementia but reportedly A&Ox3 at baseline, presenting for AMS of 1 week duration.  over the last week there has been rapid decline.  patient normally AAOx3 and completes here own ADLs. over the last week cant walk, confused, and unable to do ADLs    CTH neg    MRI brain with no acute fdinigsn.  non specific FLAIR changes likely microvascular.  multiple small susceptibility artifact c/f cavernoma vs CAA.     b12 358  TSH WNL   EEG no seiuzres. mild mod dysfunction   MRI brain no acute findings.  does have multiple small susceptibility artifacts, L cerebellum B/L ocipital L>R, L pareital and B/L frontal lobes possibel CAA     Impression:   1) dementia possible underlying cerebral amyloid angiopathy.  given relative rapid decline need to also consider rapidly progressive neurodegnerative condidtions such as CJD  2) AMS, worse than baseline.  relatively rapid decline  --> seems to be likely related to CAA and "amyloid spells"     - low/nomral b12. would supplement   - check RPR  - for dementia c/w donepezil 5mg QHS    - consider MRI brain with gado  - if not near her baseline and no other source found would consider LP with routine studies, glucose, protein, cell count, flow, cytology, viral panel, AIE panel, paraneoplastic panel, 14-3-3 protein and RT quic  ; make sure IR knows CJD precuations   - PT/OT  - delirium precuations   - check FS, glucose control <180  - GI/DVT ppx   - Thank you for allowing me to participate in the care of this patient. Call with questions.   - spoke with primary team   Jordan Castañeda MD  Vascular Neurology  Office: [phone] 
84 y/o F with dementia but reportedly A&Ox3 at baseline, presenting for AMS of 1 week duration.  over the last week there has been rapid decline.  patient normally AAOx3 and completes here own ADLs. over the last week cant walk, confused, and unable to do ADLs   CTH neg   MRI brain with no acute findings.  non specific FLAIR changes likely microvascular.  multiple small susceptibility artifact c/f cavernoma vs CAA.    b12 358  TSH WNL   RPR neg   EEG no seizures mild mod dysfunction   MRI brain no acute findings.  does have multiple small susceptibility artifacts, L cerebellum B/L occipital L>R, L pareital and B/L frontal lobes possible CAA   spoke with shirin robertson 10/7 said up until 2-3 weeks ago at [facility] she was relatively independent a bit confused but had several falls and deteriorated   UA+    Impression:   1) dementia possible underlying cerebral amyloid angiopathy.  given relative rapid decline need to also consider rapidly progressive neurodegenerative conditions such as CJD  2) AMS, worse than baseline.  relatively rapid decline  --> seems to be likely related to CAA and "amyloid spells"     - CTX for + UA  - low/normal b12. would supplement   - for dementia c/w donepezil 5mg QHS    - consider MRI brain with gado next time   - if not near her baseline and no other source found would consider LP with routine studies, glucose, protein, cell count, flow, cytology, viral panel, AIE panel, paraneoplastic panel, 14-3-3 protein and RT quic  ; make sure IR knows CJD precuations ; LP plan for  thursday now with anestheisa  - PT/OT  - delirium precuations   - check FS, glucose control <180  - GI/DVT ppx   - Thank you for allowing me to participate in the care of this patient. Call with questions.   - spoke with primary team   spoke with sister in law at bedside 10/7 and shirin Robertson (podiatrist) [phone]. gave shirin robertson update ~530PM on 10/7     Jordan Castañeda MD  Vascular Neurology  Office: [phone] 
84 y/o F with PMHx of dementia A&Ox3 at baseline, presenting for AMS of 1 week duration and admitted for AMS workup
82 y/o F with PMHx of dementia A&Ox3 at baseline, presenting for AMS of 1 week duration and admitted for AMS workup.
84 y/o F with PMHx of dementia A&Ox3 at baseline, presenting for AMS of 1 week duration and admitted for AMS workup
82 y/o F with PMHx of dementia A&Ox3 at baseline, presenting for AMS of 1 week duration and admitted for AMS workup.
82 y/o F with PMHx of dementia A&Ox3 at baseline, presenting for AMS of 1 week duration and admitted for AMS workup.
84 y/o F with PMHx of dementia A&Ox3 at baseline, presenting for AMS of 1 week duration and admitted for AMS workup
82 y/o F with PMHx of dementia A&Ox3 at baseline, presenting for AMS of 1 week duration and admitted for AMS workup.
84 y/o F with PMHx of dementia A&Ox3 at baseline, presenting for AMS of 1 week duration and admitted for AMS workup.

## 2024-10-14 NOTE — PROGRESS NOTE ADULT - PROVIDER SPECIALTY LIST ADULT
Neurology
Neurology
Hospitalist
Internal Medicine
Neurology
Neurology
Hospitalist
Neurology
Internal Medicine
Hospitalist
Internal Medicine
Internal Medicine
Hospitalist

## 2024-10-14 NOTE — DISCHARGE NOTE PROVIDER - ATTENDING DISCHARGE PHYSICAL EXAMINATION:
CONSTITUTIONAL: NAD, well-developed, well-groomed  RESPIRATORY: Normal respiratory effort; lungs are clear to auscultation bilaterally  CARDIOVASCULAR: Regular rate and rhythm, normal S1 and S2  MUSCULOSKELETAL:  no clubbing or cyanosis of digits; no joint swelling or tenderness to palpation  PSYCH: A+O to person,  NEUROLOGY:  no gross sensory deficits   SKIN: No rashes; no palpable lesions

## 2024-10-14 NOTE — DISCHARGE NOTE PROVIDER - CARE PROVIDER_API CALL
Jordan Castañeda  Neurology  [address] [facility] [address]  [place] [address]  Phone: [phone]  Fax: [phone]  Follow Up Time: 1 week

## 2024-10-14 NOTE — DISCHARGE NOTE PROVIDER - NSDCMRMEDTOKEN_GEN_ALL_CORE_FT
acetaminophen 325 mg oral tablet: 2 tab(s) orally every 6 hours As needed Temp greater or equal to 38C (100.4F), Mild Pain (1 - 3)  aluminum hydroxide-magnesium hydroxide 200 mg-200 mg/5 mL oral suspension: 30 milliliter(s) orally every 4 hours As needed Dyspepsia  aspirin 81 mg oral delayed release tablet: 1 tab(s) orally once a day  cetirizine 10 mg oral tablet: 1 tab(s) orally once a day  cyanocobalamin 1000 mcg oral tablet: 1 tab(s) orally once a day  donepezil 5 mg oral tablet: 1 tab(s) orally once a day (at bedtime)  folic acid 1 mg oral tablet: 1 tab(s) orally once a day  hydrALAZINE 10 mg oral tablet: 1 tab(s) orally 2 times a day  memantine 5 mg oral tablet: 1 tab(s) orally once a day  petrolatum topical ointment: 1 Apply topically to affected area once a day  pyridoxine 100 mg oral tablet: 1 tab(s) orally once a day  rosuvastatin 5 mg oral tablet: 1 tab(s) orally once a day (at bedtime)  Vitamin C 500 mg oral capsule: 1 cap(s) orally once a day

## 2024-10-14 NOTE — DISCHARGE NOTE PROVIDER - NSDCFUADDAPPT_GEN_ALL_CORE_FT
APPTS ARE READY TO BE MADE: [x] YES    Best Family or Patient Contact (if needed):    Additional Information about above appointments (if needed):    1:   2:   3:     Other comments or requests:    APPTS ARE READY TO BE MADE: [x] YES    Best Family or Patient Contact (if needed):    Additional Information about above appointments (if needed):    1: Neurology Dr. Castañeda   2:   3:     Other comments or requests:    APPTS ARE READY TO BE MADE: [x] YES    Best Family or Patient Contact (if needed):    Additional Information about above appointments (if needed):    1: Neurology Dr. Castañeda   2:   3:     Other comments or requests:   Patient is being discharged to rehab. Patient/caregiver will arrange follow up appointments.

## 2024-10-14 NOTE — DISCHARGE NOTE PROVIDER - HOSPITAL COURSE
HPI:  84 y/o F with PMHx of dementia A&Ox3 at baseline, presenting for AMS of 1 week duration. Patient is A&Ox0 at bedside so all collateral information has been gathered from her son and charts. A per son, patient resides at [facility] in [place]. Son reports that in the past week, pt has had a rapid decline in her mental status. She is normally orientated x 3, able to converse and complete all ADLs like cooking, bathing, dressing herself. Over the past 4-5 days, pt has become mroe disoriented, unable to walk, unable to feed herself, and has fallen twice. She presented at [facility] on Wednesday s/p fall where workup was unremarkable (CT Head, X-Rays, UA, CBC, Troponin). Over the past three days, decline has continued and she is now slumped on her R side. She does not report pain, no chest pain/pressure, no shortness of breath, no dsyuria.    In ED, VSS with intermittent HTN otherwise HDS. CBC remarkable for leukocytosis of 12.51, BMP wnl, trop negative x2 (24->23), UA negative for infection, and RVP negative. CXR showed possible RLL atelectasis and CTH non con negative for acute hemorrhage or midline shift. In ED patient was given 1L NaCl (29 Sep 2024 23:47)    Hospital Course:    83F w/PMHx of dementia  from nursing facility p/w AMS x 1 week , rapid decline in function , recurrent falls , VSS , opens eyes to verbal and tactile stimuli ,  A&Ox 0, NAD,  no focal neurologic deficits , labs w/ leukocytosis , CTH w/ no acute pathology , ekg nsr ; unclear etiology for acute decline in function , possible progression of dementia , will r/o reversible causes , would obtain MR brain , Check VEEG , TSH/ B12/ Syphilis screen, if not improving can obtain non emergent neuro consult during daytime, would also consider empiric treatment w/ thiamine and monitor for improvement .   EEG negative for seizure activity. Patient was treated for UTI with Cefuroxime 500 mg po x 2 doses then Ceftriaxone 1 gm IVPB x 3 days . Patient underwent a lumbar puncture to determine cause of mental status decline with negative CSF results  Patient with recurrent falls EKG shows NSR, Troponin, were negative x 2       Important Medication Changes and Reason:  Vitamin supplements : B6, B12      Active or Pending Issues Requiring Follow-up:  Outpatient follow up with the medical clinic or your PCP    Advanced Directives:   [x] Full code  [ ] DNR  [ ] Hospice    Discharge Diagnoses:  AMS  Falls   Dementia   UTI           HPI:  82 y/o F with PMHx of dementia A&Ox3 at baseline, presenting for AMS of 1 week duration. Patient is A&Ox0 at bedside so all collateral information has been gathered from her son and charts. A per son, patient resides at [facility] in [place]. Son reports that in the past week, pt has had a rapid decline in her mental status. She is normally orientated x 3, able to converse and complete all ADLs like cooking, bathing, dressing herself. Over the past 4-5 days, pt has become mroe disoriented, unable to walk, unable to feed herself, and has fallen twice. She presented at [facility] on Wednesday s/p fall where workup was unremarkable (CT Head, X-Rays, UA, CBC, Troponin). Over the past three days, decline has continued and she is now slumped on her R side. She does not report pain, no chest pain/pressure, no shortness of breath, no dsyuria.    In ED, VSS with intermittent HTN otherwise HDS. CBC remarkable for leukocytosis of 12.51, BMP wnl, trop negative x2 (24->23), UA negative for infection, and RVP negative. CXR showed possible RLL atelectasis and CTH non con negative for acute hemorrhage or midline shift. In ED patient was given 1L NaCl (29 Sep 2024 23:47)    Hospital Course:    83F with  PMHx of dementia  from nursing facility p/w AMS x 1 week , rapid decline in function , recurrent falls , VSS , opens eyes to verbal and tactile stimuli ,  A&Ox 0, NAD,  no focal neurologic deficits , labs w/ leukocytosis , CTH w/ no acute pathology , ekg nsr ; unclear etiology for acute decline in function , possible progression of dementia , will r/o reversible causes , would obtain MR brain , Check VEEG , TSH/ B12/ Syphilis screen, if not improving can obtain non emergent neuro consult during daytime, would also consider empiric treatment w/ thiamine and monitor for improvement .   EEG negative for seizure activity. Patient was treated for UTI with Cefuroxime 500 mg po x 2 doses then Ceftriaxone 1 gm IVPB x 3 days . Patient underwent a lumbar puncture to determine cause of mental status decline with negative CSF results  Patient with recurrent falls EKG shows NSR, Troponin, were negative x 2       Important Medication Changes and Reason:  Vitamin supplements : B6, B12      Active or Pending Issues Requiring Follow-up:  Outpatient follow up with the medical clinic or your PCP    Advanced Directives:   [x] Full code  [ ] DNR  [ ] Hospice    Discharge Diagnoses:  AMS  Falls   Dementia   UTI           HPI:  84 y/o F with PMHx of dementia A&Ox3 at baseline, presenting for AMS of 1 week duration. Patient is A&Ox0 at bedside so all collateral information has been gathered from her son and charts. A per son, patient resides at [facility] in [place]. Son reports that in the past week, pt has had a rapid decline in her mental status. She is normally orientated x 3, able to converse and complete all ADLs like cooking, bathing, dressing herself. Over the past 4-5 days, pt has become mroe disoriented, unable to walk, unable to feed herself, and has fallen twice. She presented at [facility] on Wednesday s/p fall where workup was unremarkable (CT Head, X-Rays, UA, CBC, Troponin). Over the past three days, decline has continued and she is now slumped on her R side. She does not report pain, no chest pain/pressure, no shortness of breath, no dsyuria.    In ED, VSS with intermittent HTN otherwise HDS. CBC remarkable for leukocytosis of 12.51, BMP wnl, trop negative x2 (24->23), UA negative for infection, and RVP negative. CXR showed possible RLL atelectasis and CTH non con negative for acute hemorrhage or midline shift. In ED patient was given 1L NaCl (29 Sep 2024 23:47)    Hospital Course:    83F with  PMHx of dementia  from nursing facility p/w AMS x 1 week , rapid decline in function , recurrent falls , VSS , opens eyes to verbal and tactile stimuli ,  A&Ox 0, NAD,  no focal neurologic deficits , labs w/ leukocytosis , CTH w/ no acute pathology , ekg nsr ; unclear etiology for acute decline in function , possible progression of dementia , will r/o reversible causes , would obtain MR brain , Check VEEG , TSH/ B12/ Syphilis screen, if not improving can obtain non emergent neuro consult during daytime, would also consider empiric treatment w/ thiamine and monitor for improvement .   EEG negative for seizure activity. Patient was treated for UTI with Cefuroxime 500 mg po x 2 doses then Ceftriaxone 1 gm IVPB x 3 days . Patient underwent a lumbar puncture to determine cause of mental status decline with negative CSF results  Patient with recurrent falls EKG shows NSR, Troponin, were negative x 2.  Patient clear for discharge by attending.      Important Medication Changes and Reason:  Vitamin supplements : B6, B12, Vitamin C , folic acid     Active or Pending Issues Requiring Follow-up:  Outpatient follow up with the medical clinic or your PCP    Advanced Directives:   [x] Full code  [ ] DNR  [ ] Hospice    Discharge Diagnoses:  AMS  Falls   Dementia   UTI           HPI:  82 y/o F with PMHx of dementia A&Ox3 at baseline, presenting for AMS of 1 week duration. Patient is A&Ox0 at bedside so all collateral information has been gathered from her son and charts. A per son, patient resides at [facility] in [place]. Son reports that in the past week, pt has had a rapid decline in her mental status. She is normally orientated x 3, able to converse and complete all ADLs like cooking, bathing, dressing herself. Over the past 4-5 days, pt has become mroe disoriented, unable to walk, unable to feed herself, and has fallen twice. She presented at [facility] on Wednesday s/p fall where workup was unremarkable (CT Head, X-Rays, UA, CBC, Troponin). Over the past three days, decline has continued and she is now slumped on her R side. She does not report pain, no chest pain/pressure, no shortness of breath, no dsyuria.    In ED, VSS with intermittent HTN otherwise HDS. CBC remarkable for leukocytosis of 12.51, BMP wnl, trop negative x2 (24->23), UA negative for infection, and RVP negative. CXR showed possible RLL atelectasis and CTH non con negative for acute hemorrhage or midline shift. In ED patient was given 1L NaCl (29 Sep 2024 23:47)    Hospital Course:    83F with  PMHx of dementia  from nursing facility p/w AMS x 1 week , rapid decline in function , recurrent falls , VSS , opens eyes to verbal and tactile stimuli ,  A&Ox 0, NAD,  no focal neurologic deficits , labs w/ leukocytosis , CTH w/ no acute pathology , ekg nsr ; unclear etiology for acute decline in function , possible progression of dementia , will r/o reversible causes , would obtain MR brain , Check VEEG , TSH/ B12/ Syphilis screen, if not improving can obtain non emergent neuro consult during daytime, would also consider empiric treatment w/ thiamine and monitor for improvement .   EEG negative for seizure activity. Patient was treated for UTI with Cefuroxime 500 mg po x 2 doses then Ceftriaxone 1 gm IVPB x 3 days . Patient underwent a lumbar puncture to determine cause of mental status decline with negative CSF results  Patient with recurrent falls EKG shows NSR, Troponin, were negative x 2.  Patient clear for discharge by attending.      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

## 2024-10-14 NOTE — DISCHARGE NOTE PROVIDER - NSDCFUADDINST_GEN_ALL_CORE_FT
diet: Regular diet   Encourage high quality protein, Yahir/ Prosource, MVI & Vit C to promote wound healing

## 2024-10-14 NOTE — DISCHARGE NOTE PROVIDER - NSFOLLOWUPCLINICS_GEN_ALL_ED_FT
[facility] - Primary Care  Primary Care  [address] [place] [address]  Phone: [phone]  Fax:   Follow Up Time: 1 month

## 2024-10-14 NOTE — PROGRESS NOTE ADULT - SUBJECTIVE AND OBJECTIVE BOX
Neurology      S: patient seen. no sigificant neuro change      Medications: MEDICATIONS  (STANDING):  AQUAPHOR (petrolatum Ointment) 1 Application(s) Topical daily  aspirin enteric coated 81 milliGRAM(s) Oral daily  cetirizine 10 milliGRAM(s) Oral daily  cyanocobalamin 1000 MICROGram(s) Oral daily  donepezil 5 milliGRAM(s) Oral at bedtime  folic acid 1 milliGRAM(s) Oral daily  hydrALAZINE 10 milliGRAM(s) Oral two times a day  influenza  Vaccine (HIGH DOSE) 0.5 milliLiter(s) IntraMuscular once  memantine 5 milliGRAM(s) Oral daily  pyridoxine 100 milliGRAM(s) Oral daily  rosuvastatin 5 milliGRAM(s) Oral at bedtime    MEDICATIONS  (PRN):  acetaminophen     Tablet .. 650 milliGRAM(s) Oral every 6 hours PRN Temp greater or equal to 38C (100.4F), Mild Pain (1 - 3)  aluminum hydroxide/magnesium hydroxide/simethicone Suspension 30 milliLiter(s) Oral every 4 hours PRN Dyspepsia  melatonin 3 milliGRAM(s) Oral at bedtime PRN Insomnia  ondansetron Injectable 4 milliGRAM(s) IV Push every 8 hours PRN Nausea and/or Vomiting       Vitals:  Vital Signs Last 24 Hrs  T(C): 37.2 (06 Oct 2024 04:50), Max: 38 (05 Oct 2024 20:41)  T(F): 99 (06 Oct 2024 04:50), Max: 100.4 (05 Oct 2024 20:41)  HR: 89 (06 Oct 2024 04:50) (70 - 89)  BP: 157/82 (06 Oct 2024 04:50) (142/73 - 157/82)  BP(mean): --  RR: 18 (06 Oct 2024 04:50) (18 - 18)  SpO2: 100% (06 Oct 2024 04:50) (95% - 100%)    Parameters below as of 06 Oct 2024 04:50  Patient On (Oxygen Delivery Method): room air                  General Exam:   General Appearance: Appropriately dressed and in no acute distress       Head: Normocephalic, atraumatic and no dysmorphic features  Ear, Nose, and Throat: Moist mucous membranes  CVS: S1S2+  Resp: No SOB, no wheeze or rhonchi  GI: soft NT/ND  Extremities: No edema or cyanosis  Skin: No bruises or rashes     Neurological Exam:  Mental Status: Awake, alert and oriented x1  Able to follow simple and complex verbal commands. Able to name and repeat. fluent speech. No obvious aphasia or dysarthria noted.   Cranial Nerves: PERRL, EOMI, VFFC, sensation V1-V3 intact,  no obvious facial asymmetry, equal elevation of palate, scm/trap 5/5, tongue is midline on protrusion. no obvious papilledema on fundoscopic exam. hearing is grossly intact.   Motor: Normal bulk, tone and strength throughout but moving uppers 5/5 > lowers 3/5   Sensation: Intact to light touch and pinprick throughout. no right/left confusion. no extinction to tactile on DSS.   Reflexes: 1+ throughout at biceps, brachioradialis, triceps, patellars and ankles bilaterally and equal. No clonus. R toe and L toe were both downgoing.  Coordination: No dysmetria on FNF     Gait: deferred     Data/Labs/Imaging which I personally reviewed.     Labs:     LABS:                          12.5   8.25  )-----------( 302      ( 05 Oct 2024 11:20 )             40.4     10-06    141  |  105  |  19  ----------------------------<  87  4.1   |  24  |  0.89    Ca    9.2      06 Oct 2024 06:42    TPro  6.8  /  Alb  3.1[L]  /  TBili  0.6  /  DBili  x   /  AST  28  /  ALT  21  /  AlkPhos  81  10-06    LIVER FUNCTIONS - ( 06 Oct 2024 06:42 )  Alb: 3.1 g/dL / Pro: 6.8 g/dL / ALK PHOS: 81 U/L / ALT: 21 U/L / AST: 28 U/L / GGT: x           PT/INR - ( 06 Oct 2024 06:42 )   PT: 13.5 sec;   INR: 1.18 ratio         PTT - ( 06 Oct 2024 06:42 )  PTT:33.7 sec  Urinalysis Basic - ( 06 Oct 2024 06:42 )    Color: x / Appearance: x / SG: x / pH: x  Gluc: 87 mg/dL / Ketone: x  / Bili: x / Urobili: x   Blood: x / Protein: x / Nitrite: x   Leuk Esterase: x / RBC: x / WBC x   Sq Epi: x / Non Sq Epi: x / Bacteria: x              < from: CT Head No Cont (09.29.24 @ 16:17) >    ACC: [id number] EXAM:  CT BRAIN   ORDERED BY:  YOVANY VALDES     PROCEDURE DATE:  09/29/2024          INTERPRETATION:  CLINICAL INFORMATION: AMS, slumping to the right    COMPARISON: None.    CONTRAST:  IV Contrast: NONE  Complications: None reportedat time of study completion    TECHNIQUE:  Serial axial images were obtained from the skull base to the   vertex using multi-slice helical technique. Sagittal and coronal   reformats were obtained.    FINDINGS:  Examination limited due to patient motion.    VENTRICLES AND SULCI: Age appropriate involutional changes.  INTRA-AXIAL: No mass effect, acute hemorrhage, or midline shift.  There   are periventricular and subcortical white matter hypodensities,   consistent with microvascular type changes.  EXTRA-AXIAL: No mass or fluid collection. Basal cisterns are normal in   appearance.    VISUALIZED SINUSES:  Trace right maxillary sinus mucosal thickening.  TYMPANOMASTOID CAVITIES:  Clear.  VISUALIZED ORBITS: Bilateral lens replacement.  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 Hyperostosis frontalis interna.    MISCELLANEOUS: None.      IMPRESSION:  No acute intracranial hemorrhage, mass effect, or midline shift.       < from: MR Head No Cont (09.30.24 @ 22:24) >    ACC: [id number] EXAM:  MR BRAIN   ORDERED BY:  LEWIS MARTINEZ     PROCEDURE DATE:  09/30/2024          INTERPRETATION:  CLINICAL INDICATIONS: ams    COMPARISON: Head CT dated 9/29/2024    TECHNIQUE: MRI brain: Multiplanar, multisequence MR imaging of the brain   are obtained without the administration of intravenous Gadavist contrast.    FINDINGS:  There is no abnormal restricted diffusion to suggest acute infarction.   Scattered periventricular and subcortical white matter T2 /FLAIR   hyperintensities are seen without mass effect, nonspecific, likely   representing moderate to severe chronic microvascular changes. Normal T2   flow-voids are seen within  the intracranial vasculature. The lateral   ventricles and cortical sulci are age-appropriate in size and   configuration. There is no mass, mass effect, or extra-axial fluid   collection.    Multiple small susceptibility artifacts in left cerebellum, bilateral   occipital lobes, left worse than right, left parietal lobe, bilateral   frontallobes. Differential diagnosis includes cavernoma's, chronic   hypertensive microhemorrhages, amyloid angiopathy. Midline structures are   normal.    The patient is status post bilateral ocular lens replacement surgery.   Mild inflammatory mucosal changes are seen throughout the various   portions of the paranasal sinuses. The orbits and mastoid air cells are   unremarkable.    IMPRESSION: No acute infarction.    --- End of Report ---               < from: MR Head No Cont (09.30.24 @ 22:24) >    ACC: [id number] EXAM:  MR BRAIN   ORDERED BY:  LEWIS MARTINEZ     PROCEDURE DATE:  09/30/2024          INTERPRETATION:  CLINICAL INDICATIONS: ams    COMPARISON: Head CT dated 9/29/2024    TECHNIQUE: MRI brain: Multiplanar, multisequence MR imaging of the brain   are obtained without the administration of intravenous Gadavist contrast.    FINDINGS:  There is no abnormal restricted diffusion to suggest acute infarction.   Scattered periventricular and subcortical white matter T2 /FLAIR   hyperintensities are seen without mass effect, nonspecific, likely   representing moderate to severe chronic microvascular changes. Normal T2   flow-voids are seen within  the intracranial vasculature. The lateral   ventricles and cortical sulci are age-appropriate in size and   configuration. There is no mass, mass effect, or extra-axial fluid   collection.    Multiple small susceptibility artifacts in left cerebellum, bilateral   occipital lobes, left worse than right, left parietal lobe, bilateral   frontallobes. Differential diagnosis includes cavernoma's, chronic   hypertensive microhemorrhages, amyloid angiopathy. Midline structures are   normal.    The patient is status post bilateral ocular lens replacement surgery.   Mild inflammatory mucosal changes are seen throughout the various   portions of the paranasal sinuses. The orbits and mastoid air cells are   unremarkable.    IMPRESSION: No acute infarction.    --- End of Report ---            BARBARA HURLEY MD; Attending Radiologist  This document has been electronically signed. Sep 30 2024 10:39PM    < end of copied text >  
Neurology      S: patient seen. no sigificant neuro change      Medications: MEDICATIONS  (STANDING):  AQUAPHOR (petrolatum Ointment) 1 Application(s) Topical daily  aspirin enteric coated 81 milliGRAM(s) Oral daily  cetirizine 10 milliGRAM(s) Oral daily  cyanocobalamin 1000 MICROGram(s) Oral daily  dextrose 5% + sodium chloride 0.45%. 1000 milliLiter(s) (50 mL/Hr) IV Continuous <Continuous>  donepezil 5 milliGRAM(s) Oral at bedtime  folic acid 1 milliGRAM(s) Oral daily  hydrALAZINE 10 milliGRAM(s) Oral two times a day  influenza  Vaccine (HIGH DOSE) 0.5 milliLiter(s) IntraMuscular once  memantine 5 milliGRAM(s) Oral daily  pyridoxine 100 milliGRAM(s) Oral daily  rosuvastatin 5 milliGRAM(s) Oral at bedtime    MEDICATIONS  (PRN):  acetaminophen     Tablet .. 650 milliGRAM(s) Oral every 6 hours PRN Temp greater or equal to 38C (100.4F), Mild Pain (1 - 3)  aluminum hydroxide/magnesium hydroxide/simethicone Suspension 30 milliLiter(s) Oral every 4 hours PRN Dyspepsia  melatonin 3 milliGRAM(s) Oral at bedtime PRN Insomnia  ondansetron Injectable 4 milliGRAM(s) IV Push every 8 hours PRN Nausea and/or Vomiting       Vitals:  Vital Signs Last 24 Hrs  T(C): 36.8 (14 Oct 2024 05:14), Max: 36.9 (13 Oct 2024 20:38)  T(F): 98.2 (14 Oct 2024 05:14), Max: 98.5 (13 Oct 2024 20:38)  HR: 58 (14 Oct 2024 05:14) (58 - 68)  BP: 146/75 (14 Oct 2024 05:14) (110/71 - 146/75)  BP(mean): --  RR: 18 (14 Oct 2024 05:14) (18 - 18)  SpO2: 96% (14 Oct 2024 05:14) (94% - 96%)    Parameters below as of 14 Oct 2024 05:14  Patient On (Oxygen Delivery Method): room air                  General Exam:   General Appearance: Appropriately dressed and in no acute distress       Head: Normocephalic, atraumatic and no dysmorphic features  Ear, Nose, and Throat: Moist mucous membranes  CVS: S1S2+  Resp: No SOB, no wheeze or rhonchi  GI: soft NT/ND  Extremities: No edema or cyanosis  Skin: No bruises or rashes     Neurological Exam:  Mental Status: Awake, alert and oriented x1  Able to follow simple and complex verbal commands. Able to name and repeat. fluent speech. No obvious aphasia or dysarthria noted.   Cranial Nerves: PERRL, EOMI, VFFC, sensation V1-V3 intact,  no obvious facial asymmetry, equal elevation of palate, scm/trap 5/5, tongue is midline on protrusion. no obvious papilledema on fundoscopic exam. hearing is grossly intact.   Motor: Normal bulk, tone and strength throughout but moving uppers 5/5 > lowers 3/5   Sensation: Intact to light touch and pinprick throughout. no right/left confusion. no extinction to tactile on DSS.   Reflexes: 1+ throughout at biceps, brachioradialis, triceps, patellars and ankles bilaterally and equal. No clonus. R toe and L toe were both downgoing.  Coordination: No dysmetria on FNF     Gait: deferred     Data/Labs/Imaging which I personally reviewed.         LABS:                          12.2   7.15  )-----------( 340      ( 14 Oct 2024 07:13 )             39.8     10-14    140  |  107  |  23  ----------------------------<  71  4.9   |  21[L]  |  1.10    Ca    9.1      14 Oct 2024 07:30          Urinalysis Basic - ( 14 Oct 2024 07:30 )    Color: x / Appearance: x / SG: x / pH: x  Gluc: 71 mg/dL / Ketone: x  / Bili: x / Urobili: x   Blood: x / Protein: x / Nitrite: x   Leuk Esterase: x / RBC: x / WBC x   Sq Epi: x / Non Sq Epi: x / Bacteria: x                < from: CT Head No Cont (09.29.24 @ 16:17) >    ACC: [id number] EXAM:  CT BRAIN   ORDERED BY:  YOVANY VALDES     PROCEDURE DATE:  09/29/2024          INTERPRETATION:  CLINICAL INFORMATION: AMS, slumping to the right    COMPARISON: None.    CONTRAST:  IV Contrast: NONE  Complications: None reportedat time of study completion    TECHNIQUE:  Serial axial images were obtained from the skull base to the   vertex using multi-slice helical technique. Sagittal and coronal   reformats were obtained.    FINDINGS:  Examination limited due to patient motion.    VENTRICLES AND SULCI: Age appropriate involutional changes.  INTRA-AXIAL: No mass effect, acute hemorrhage, or midline shift.  There   are periventricular and subcortical white matter hypodensities,   consistent with microvascular type changes.  EXTRA-AXIAL: No mass or fluid collection. Basal cisterns are normal in   appearance.    VISUALIZED SINUSES:  Trace right maxillary sinus mucosal thickening.  TYMPANOMASTOID CAVITIES:  Clear.  VISUALIZED ORBITS: Bilateral lens replacement.  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 Hyperostosis frontalis interna.    MISCELLANEOUS: None.      IMPRESSION:  No acute intracranial hemorrhage, mass effect, or midline shift.       < from: MR Head No Cont (09.30.24 @ 22:24) >    ACC: [id number] EXAM:  MR BRAIN   ORDERED BY:  LEWIS MARTINEZ     PROCEDURE DATE:  09/30/2024          INTERPRETATION:  CLINICAL INDICATIONS: ams    COMPARISON: Head CT dated 9/29/2024    TECHNIQUE: MRI brain: Multiplanar, multisequence MR imaging of the brain   are obtained without the administration of intravenous Gadavist contrast.    FINDINGS:  There is no abnormal restricted diffusion to suggest acute infarction.   Scattered periventricular and subcortical white matter T2 /FLAIR   hyperintensities are seen without mass effect, nonspecific, likely   representing moderate to severe chronic microvascular changes. Normal T2   flow-voids are seen within  the intracranial vasculature. The lateral   ventricles and cortical sulci are age-appropriate in size and   configuration. There is no mass, mass effect, or extra-axial fluid   collection.    Multiple small susceptibility artifacts in left cerebellum, bilateral   occipital lobes, left worse than right, left parietal lobe, bilateral   frontallobes. Differential diagnosis includes cavernoma's, chronic   hypertensive microhemorrhages, amyloid angiopathy. Midline structures are   normal.    The patient is status post bilateral ocular lens replacement surgery.   Mild inflammatory mucosal changes are seen throughout the various   portions of the paranasal sinuses. The orbits and mastoid air cells are   unremarkable.    IMPRESSION: No acute infarction.    --- End of Report ---               < from: MR Head No Cont (09.30.24 @ 22:24) >    ACC: [id number] EXAM:  MR BRAIN   ORDERED BY:  OSCARKAREN MICHELLE     PROCEDURE DATE:  09/30/2024          INTERPRETATION:  CLINICAL INDICATIONS: ams    COMPARISON: Head CT dated 9/29/2024    TECHNIQUE: MRI brain: Multiplanar, multisequence MR imaging of the brain   are obtained without the administration of intravenous Gadavist contrast.    FINDINGS:  There is no abnormal restricted diffusion to suggest acute infarction.   Scattered periventricular and subcortical white matter T2 /FLAIR   hyperintensities are seen without mass effect, nonspecific, likely   representing moderate to severe chronic microvascular changes. Normal T2   flow-voids are seen within  the intracranial vasculature. The lateral   ventricles and cortical sulci are age-appropriate in size and   configuration. There is no mass, mass effect, or extra-axial fluid   collection.    Multiple small susceptibility artifacts in left cerebellum, bilateral   occipital lobes, left worse than right, left parietal lobe, bilateral   frontallobes. Differential diagnosis includes cavernoma's, chronic   hypertensive microhemorrhages, amyloid angiopathy. Midline structures are   normal.    The patient is status post bilateral ocular lens replacement surgery.   Mild inflammatory mucosal changes are seen throughout the various   portions of the paranasal sinuses. The orbits and mastoid air cells are   unremarkable.    IMPRESSION: No acute infarction.    --- End of Report ---            BARBARA HURLEY MD; Attending Radiologist  This document has been electronically signed. Sep 30 2024 10:39PM    < end of copied text >  
Neurology      S: patient seen. no sigificant neuro changes ; EEG running       Medications: MEDICATIONS  (STANDING):  aspirin enteric coated 81 milliGRAM(s) Oral daily  cetirizine 10 milliGRAM(s) Oral daily  cyanocobalamin 1000 MICROGram(s) Oral daily  donepezil 5 milliGRAM(s) Oral at bedtime  folic acid 1 milliGRAM(s) Oral daily  heparin   Injectable 5000 Unit(s) SubCutaneous every 8 hours  hydrALAZINE 10 milliGRAM(s) Oral two times a day  memantine 5 milliGRAM(s) Oral daily  pyridoxine 100 milliGRAM(s) Oral daily  rosuvastatin 5 milliGRAM(s) Oral at bedtime    MEDICATIONS  (PRN):  acetaminophen     Tablet .. 650 milliGRAM(s) Oral every 6 hours PRN Temp greater or equal to 38C (100.4F), Mild Pain (1 - 3)  aluminum hydroxide/magnesium hydroxide/simethicone Suspension 30 milliLiter(s) Oral every 4 hours PRN Dyspepsia  melatonin 3 milliGRAM(s) Oral at bedtime PRN Insomnia  ondansetron Injectable 4 milliGRAM(s) IV Push every 8 hours PRN Nausea and/or Vomiting       Vitals:  Vital Signs Last 24 Hrs  T(C): 36.6 (02 Oct 2024 10:21), Max: 36.6 (01 Oct 2024 20:00)  T(F): 97.9 (02 Oct 2024 10:21), Max: 97.9 (02 Oct 2024 10:21)  HR: 65 (02 Oct 2024 10:40) (64 - 71)  BP: 142/78 (02 Oct 2024 10:40) (139/64 - 181/90)  BP(mean): --  RR: 18 (02 Oct 2024 10:26) (18 - 18)  SpO2: 98% (02 Oct 2024 10:26) (98% - 100%)    Parameters below as of 02 Oct 2024 10:21  Patient On (Oxygen Delivery Method): room air            General Exam:   General Appearance: Appropriately dressed and in no acute distress       Head: Normocephalic, atraumatic and no dysmorphic features  Ear, Nose, and Throat: Moist mucous membranes  CVS: S1S2+  Resp: No SOB, no wheeze or rhonchi  GI: soft NT/ND  Extremities: No edema or cyanosis  Skin: No bruises or rashes     Neurological Exam:  Mental Status: Awake, alert and oriented x1  Able to follow simple and complex verbal commands. Able to name and repeat. fluent speech. No obvious aphasia or dysarthria noted.   Cranial Nerves: PERRL, EOMI, VFFC, sensation V1-V3 intact,  no obvious facial asymmetry, equal elevation of palate, scm/trap 5/5, tongue is midline on protrusion. no obvious papilledema on fundoscopic exam. hearing is grossly intact.   Motor: Normal bulk, tone and strength throughout but moving uppers 5/5 > lowers 3/5   Sensation: Intact to light touch and pinprick throughout. no right/left confusion. no extinction to tactile on DSS.   Reflexes: 1+ throughout at biceps, brachioradialis, triceps, patellars and ankles bilaterally and equal. No clonus. R toe and L toe were both downgoing.  Coordination: No dysmetria on FNF     Gait: deferred     Data/Labs/Imaging which I personally reviewed.     Labs:     LABS:                          11.2   9.61  )-----------( 272      ( 01 Oct 2024 07:17 )             36.1     10-01    142  |  107  |  26[H]  ----------------------------<  65[L]  4.3   |  22  |  0.88    Ca    9.1      01 Oct 2024 07:05  Phos  2.8     10-01  Mg     1.9     10-01          Urinalysis Basic - ( 01 Oct 2024 07:05 )    Color: x / Appearance: x / SG: x / pH: x  Gluc: 65 mg/dL / Ketone: x  / Bili: x / Urobili: x   Blood: x / Protein: x / Nitrite: x   Leuk Esterase: x / RBC: x / WBC x   Sq Epi: x / Non Sq Epi: x / Bacteria: x        < from: CT Head No Cont (09.29.24 @ 16:17) >    ACC: [id number] EXAM:  CT BRAIN   ORDERED BY:  YOVANY VALDES     PROCEDURE DATE:  09/29/2024          INTERPRETATION:  CLINICAL INFORMATION: AMS, slumping to the right    COMPARISON: None.    CONTRAST:  IV Contrast: NONE  Complications: None reportedat time of study completion    TECHNIQUE:  Serial axial images were obtained from the skull base to the   vertex using multi-slice helical technique. Sagittal and coronal   reformats were obtained.    FINDINGS:  Examination limited due to patient motion.    VENTRICLES AND SULCI: Age appropriate involutional changes.  INTRA-AXIAL: No mass effect, acute hemorrhage, or midline shift.  There   are periventricular and subcortical white matter hypodensities,   consistent with microvascular type changes.  EXTRA-AXIAL: No mass or fluid collection. Basal cisterns are normal in   appearance.    VISUALIZED SINUSES:  Trace right maxillary sinus mucosal thickening.  TYMPANOMASTOID CAVITIES:  Clear.  VISUALIZED ORBITS: Bilateral lens replacement.  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 Hyperostosis frontalis interna.    MISCELLANEOUS: None.      IMPRESSION:  No acute intracranial hemorrhage, mass effect, or midline shift.       < from: MR Head No Cont (09.30.24 @ 22:24) >    ACC: [id number] EXAM:  MR BRAIN   ORDERED BY:  Purewine     PROCEDURE DATE:  09/30/2024          INTERPRETATION:  CLINICAL INDICATIONS: ams    COMPARISON: Head CT dated 9/29/2024    TECHNIQUE: MRI brain: Multiplanar, multisequence MR imaging of the brain   are obtained without the administration of intravenous Gadavist contrast.    FINDINGS:  There is no abnormal restricted diffusion to suggest acute infarction.   Scattered periventricular and subcortical white matter T2 /FLAIR   hyperintensities are seen without mass effect, nonspecific, likely   representing moderate to severe chronic microvascular changes. Normal T2   flow-voids are seen within  the intracranial vasculature. The lateral   ventricles and cortical sulci are age-appropriate in size and   configuration. There is no mass, mass effect, or extra-axial fluid   collection.    Multiple small susceptibility artifacts in left cerebellum, bilateral   occipital lobes, left worse than right, left parietal lobe, bilateral   frontallobes. Differential diagnosis includes cavernoma's, chronic   hypertensive microhemorrhages, amyloid angiopathy. Midline structures are   normal.    The patient is status post bilateral ocular lens replacement surgery.   Mild inflammatory mucosal changes are seen throughout the various   portions of the paranasal sinuses. The orbits and mastoid air cells are   unremarkable.    IMPRESSION: No acute infarction.    --- End of Report ---               < from: MR Head No Cont (09.30.24 @ 22:24) >    ACC: [id number] EXAM:  MR BRAIN   ORDERED BY:  Purewine     PROCEDURE DATE:  09/30/2024          INTERPRETATION:  CLINICAL INDICATIONS: ams    COMPARISON: Head CT dated 9/29/2024    TECHNIQUE: MRI brain: Multiplanar, multisequence MR imaging of the brain   are obtained without the administration of intravenous Gadavist contrast.    FINDINGS:  There is no abnormal restricted diffusion to suggest acute infarction.   Scattered periventricular and subcortical white matter T2 /FLAIR   hyperintensities are seen without mass effect, nonspecific, likely   representing moderate to severe chronic microvascular changes. Normal T2   flow-voids are seen within  the intracranial vasculature. The lateral   ventricles and cortical sulci are age-appropriate in size and   configuration. There is no mass, mass effect, or extra-axial fluid   collection.    Multiple small susceptibility artifacts in left cerebellum, bilateral   occipital lobes, left worse than right, left parietal lobe, bilateral   frontallobes. Differential diagnosis includes cavernoma's, chronic   hypertensive microhemorrhages, amyloid angiopathy. Midline structures are   normal.    The patient is status post bilateral ocular lens replacement surgery.   Mild inflammatory mucosal changes are seen throughout the various   portions of the paranasal sinuses. The orbits and mastoid air cells are   unremarkable.    IMPRESSION: No acute infarction.    --- End of Report ---            BARBARA HURLEY MD; Attending Radiologist  This document has been electronically signed. Sep 30 2024 10:39PM    < end of copied text >  
[facility] Division of Hospital Medicine  Lizzy Erazo MD  Available via MS Teams      SUBJECTIVE / OVERNIGHT EVENTS: Pt more lethargic this morning, more alert in the afternoon.     ADDITIONAL REVIEW OF SYSTEMS: ROS reviewed and found to be negative    MEDICATIONS  (STANDING):  AQUAPHOR (petrolatum Ointment) 1 Application(s) Topical daily  aspirin enteric coated 81 milliGRAM(s) Oral daily  cetirizine 10 milliGRAM(s) Oral daily  cyanocobalamin 1000 MICROGram(s) Oral daily  dextrose 5% + sodium chloride 0.45%. 1000 milliLiter(s) (50 mL/Hr) IV Continuous <Continuous>  donepezil 5 milliGRAM(s) Oral at bedtime  folic acid 1 milliGRAM(s) Oral daily  hydrALAZINE 10 milliGRAM(s) Oral two times a day  influenza  Vaccine (HIGH DOSE) 0.5 milliLiter(s) IntraMuscular once  memantine 5 milliGRAM(s) Oral daily  pyridoxine 100 milliGRAM(s) Oral daily  rosuvastatin 5 milliGRAM(s) Oral at bedtime    MEDICATIONS  (PRN):  acetaminophen     Tablet .. 650 milliGRAM(s) Oral every 6 hours PRN Temp greater or equal to 38C (100.4F), Mild Pain (1 - 3)  aluminum hydroxide/magnesium hydroxide/simethicone Suspension 30 milliLiter(s) Oral every 4 hours PRN Dyspepsia  melatonin 3 milliGRAM(s) Oral at bedtime PRN Insomnia  ondansetron Injectable 4 milliGRAM(s) IV Push every 8 hours PRN Nausea and/or Vomiting      I&O's Summary    05 Oct 2024 07:01  -  06 Oct 2024 07:00  --------------------------------------------------------  IN: 100 mL / OUT: 0 mL / NET: 100 mL    06 Oct 2024 07:01  -  06 Oct 2024 16:02  --------------------------------------------------------  IN: 0 mL / OUT: 200 mL / NET: -200 mL        PHYSICAL EXAM:  Vital Signs Last 24 Hrs  T(C): 36.3 (06 Oct 2024 10:05), Max: 38 (05 Oct 2024 20:41)  T(F): 97.3 (06 Oct 2024 10:05), Max: 100.4 (05 Oct 2024 20:41)  HR: 66 (06 Oct 2024 10:05) (66 - 89)  BP: 126/60 (06 Oct 2024 10:05) (126/60 - 157/82)  BP(mean): --  RR: 18 (06 Oct 2024 10:05) (18 - 18)  SpO2: 96% (06 Oct 2024 10:05) (95% - 100%)    Parameters below as of 06 Oct 2024 10:05  Patient On (Oxygen Delivery Method): room air        CONSTITUTIONAL: NAD, well-developed, well-groomed, [place]  RESPIRATORY: Normal respiratory effort; lungs are clear to auscultation bilaterally  CARDIOVASCULAR: Regular rate and rhythm, normal S1 and S2, +murmur  ABDOMEN: Nontender to palpation, nondistended, soft  PSYCH: A+O to person, place confused, tired    LABS:                        11.6   8.67  )-----------( 327      ( 06 Oct 2024 06:42 )             36.5     10-06    141  |  105  |  19  ----------------------------<  87  4.1   |  24  |  0.89    Ca    9.2      06 Oct 2024 06:42    TPro  6.8  /  Alb  3.1[L]  /  TBili  0.6  /  DBili  x   /  AST  28  /  ALT  21  /  AlkPhos  81  10-06    PT/INR - ( 06 Oct 2024 06:42 )   PT: 13.5 sec;   INR: 1.18 ratio         PTT - ( 06 Oct 2024 06:42 )  PTT:33.7 sec      Urinalysis Basic - ( 06 Oct 2024 06:42 )    Color: x / Appearance: x / SG: x / pH: x  Gluc: 87 mg/dL / Ketone: x  / Bili: x / Urobili: x   Blood: x / Protein: x / Nitrite: x   Leuk Esterase: x / RBC: x / WBC x   Sq Epi: x / Non Sq Epi: x / Bacteria: x            RADIOLOGY & ADDITIONAL TESTS:  New Imaging Personally Reviewed Today:  New Electrocardiogram Personally Reviewed Today:  Other Results Reviewed Today:   Prior or Outpatient Records Reviewed Today with Summary:    COORDINATION OF CARE:  Consultant Communication and Details of Discussion (where applicable):    
Neurology      S: patient seen. no sigificant neuro change        Medications: MEDICATIONS  (STANDING):  AQUAPHOR (petrolatum Ointment) 1 Application(s) Topical daily  aspirin enteric coated 81 milliGRAM(s) Oral daily  cefTRIAXone   IVPB      cetirizine 10 milliGRAM(s) Oral daily  cyanocobalamin 1000 MICROGram(s) Oral daily  dextrose 5% + sodium chloride 0.45%. 1000 milliLiter(s) (50 mL/Hr) IV Continuous <Continuous>  donepezil 5 milliGRAM(s) Oral at bedtime  folic acid 1 milliGRAM(s) Oral daily  hydrALAZINE 10 milliGRAM(s) Oral two times a day  influenza  Vaccine (HIGH DOSE) 0.5 milliLiter(s) IntraMuscular once  memantine 5 milliGRAM(s) Oral daily  pyridoxine 100 milliGRAM(s) Oral daily  rosuvastatin 5 milliGRAM(s) Oral at bedtime    MEDICATIONS  (PRN):  acetaminophen     Tablet .. 650 milliGRAM(s) Oral every 6 hours PRN Temp greater or equal to 38C (100.4F), Mild Pain (1 - 3)  aluminum hydroxide/magnesium hydroxide/simethicone Suspension 30 milliLiter(s) Oral every 4 hours PRN Dyspepsia  melatonin 3 milliGRAM(s) Oral at bedtime PRN Insomnia  ondansetron Injectable 4 milliGRAM(s) IV Push every 8 hours PRN Nausea and/or Vomiting       Vitals:  Vital Signs Last 24 Hrs  T(C): 37.1 (07 Oct 2024 11:03), Max: 37.1 (07 Oct 2024 11:03)  T(F): 98.7 (07 Oct 2024 11:03), Max: 98.7 (07 Oct 2024 11:03)  HR: 99 (07 Oct 2024 11:03) (62 - 99)  BP: 143/76 (07 Oct 2024 11:03) (134/82 - 145/85)  BP(mean): --  RR: 18 (07 Oct 2024 11:03) (18 - 18)  SpO2: 98% (07 Oct 2024 11:03) (95% - 98%)    Parameters below as of 07 Oct 2024 11:03  Patient On (Oxygen Delivery Method): room air           General Exam:   General Appearance: Appropriately dressed and in no acute distress       Head: Normocephalic, atraumatic and no dysmorphic features  Ear, Nose, and Throat: Moist mucous membranes  CVS: S1S2+  Resp: No SOB, no wheeze or rhonchi  GI: soft NT/ND  Extremities: No edema or cyanosis  Skin: No bruises or rashes     Neurological Exam:  Mental Status: Awake, alert and oriented x1  Able to follow simple and complex verbal commands. Able to name and repeat. fluent speech. No obvious aphasia or dysarthria noted.   Cranial Nerves: PERRL, EOMI, VFFC, sensation V1-V3 intact,  no obvious facial asymmetry, equal elevation of palate, scm/trap 5/5, tongue is midline on protrusion. no obvious papilledema on fundoscopic exam. hearing is grossly intact.   Motor: Normal bulk, tone and strength throughout but moving uppers 5/5 > lowers 3/5   Sensation: Intact to light touch and pinprick throughout. no right/left confusion. no extinction to tactile on DSS.   Reflexes: 1+ throughout at biceps, brachioradialis, triceps, patellars and ankles bilaterally and equal. No clonus. R toe and L toe were both downgoing.  Coordination: No dysmetria on FNF     Gait: deferred     Data/Labs/Imaging which I personally reviewed.       LABS:                          12.4   9.35  )-----------( 339      ( 07 Oct 2024 07:22 )             39.9     10-07    139  |  105  |  20  ----------------------------<  95  4.0   |  23  |  1.00    Ca    9.0      07 Oct 2024 07:21    TPro  6.8  /  Alb  3.1[L]  /  TBili  0.6  /  DBili  x   /  AST  28  /  ALT  21  /  AlkPhos  81  10-06    LIVER FUNCTIONS - ( 06 Oct 2024 06:42 )  Alb: 3.1 g/dL / Pro: 6.8 g/dL / ALK PHOS: 81 U/L / ALT: 21 U/L / AST: 28 U/L / GGT: x           PT/INR - ( 07 Oct 2024 09:13 )   PT: 12.0 sec;   INR: 1.04 ratio         PTT - ( 06 Oct 2024 06:42 )  PTT:33.7 sec  Urinalysis Basic - ( 07 Oct 2024 07:21 )    Color: x / Appearance: x / SG: x / pH: x  Gluc: 95 mg/dL / Ketone: x  / Bili: x / Urobili: x   Blood: x / Protein: x / Nitrite: x   Leuk Esterase: x / RBC: x / WBC x   Sq Epi: x / Non Sq Epi: x / Bacteria: x            < from: CT Head No Cont (09.29.24 @ 16:17) >    ACC: [id number] EXAM:  CT BRAIN   ORDERED BY:  YOVANY VALDES     PROCEDURE DATE:  09/29/2024          INTERPRETATION:  CLINICAL INFORMATION: AMS, slumping to the right    COMPARISON: None.    CONTRAST:  IV Contrast: NONE  Complications: None reportedat time of study completion    TECHNIQUE:  Serial axial images were obtained from the skull base to the   vertex using multi-slice helical technique. Sagittal and coronal   reformats were obtained.    FINDINGS:  Examination limited due to patient motion.    VENTRICLES AND SULCI: Age appropriate involutional changes.  INTRA-AXIAL: No mass effect, acute hemorrhage, or midline shift.  There   are periventricular and subcortical white matter hypodensities,   consistent with microvascular type changes.  EXTRA-AXIAL: No mass or fluid collection. Basal cisterns are normal in   appearance.    VISUALIZED SINUSES:  Trace right maxillary sinus mucosal thickening.  TYMPANOMASTOID CAVITIES:  Clear.  VISUALIZED ORBITS: Bilateral lens replacement.  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 Hyperostosis frontalis interna.    MISCELLANEOUS: None.      IMPRESSION:  No acute intracranial hemorrhage, mass effect, or midline shift.       < from: MR Head No Cont (09.30.24 @ 22:24) >    ACC: [id number] EXAM:  MR BRAIN   ORDERED BY:  LEWIS MARTINEZ     PROCEDURE DATE:  09/30/2024          INTERPRETATION:  CLINICAL INDICATIONS: ams    COMPARISON: Head CT dated 9/29/2024    TECHNIQUE: MRI brain: Multiplanar, multisequence MR imaging of the brain   are obtained without the administration of intravenous Gadavist contrast.    FINDINGS:  There is no abnormal restricted diffusion to suggest acute infarction.   Scattered periventricular and subcortical white matter T2 /FLAIR   hyperintensities are seen without mass effect, nonspecific, likely   representing moderate to severe chronic microvascular changes. Normal T2   flow-voids are seen within  the intracranial vasculature. The lateral   ventricles and cortical sulci are age-appropriate in size and   configuration. There is no mass, mass effect, or extra-axial fluid   collection.    Multiple small susceptibility artifacts in left cerebellum, bilateral   occipital lobes, left worse than right, left parietal lobe, bilateral   frontallobes. Differential diagnosis includes cavernoma's, chronic   hypertensive microhemorrhages, amyloid angiopathy. Midline structures are   normal.    The patient is status post bilateral ocular lens replacement surgery.   Mild inflammatory mucosal changes are seen throughout the various   portions of the paranasal sinuses. The orbits and mastoid air cells are   unremarkable.    IMPRESSION: No acute infarction.    --- End of Report ---               < from: MR Head No Cont (09.30.24 @ 22:24) >    ACC: [id number] EXAM:  MR BRAIN   ORDERED BY:  LEWIS MARTINEZ     PROCEDURE DATE:  09/30/2024          INTERPRETATION:  CLINICAL INDICATIONS: ams    COMPARISON: Head CT dated 9/29/2024    TECHNIQUE: MRI brain: Multiplanar, multisequence MR imaging of the brain   are obtained without the administration of intravenous Gadavist contrast.    FINDINGS:  There is no abnormal restricted diffusion to suggest acute infarction.   Scattered periventricular and subcortical white matter T2 /FLAIR   hyperintensities are seen without mass effect, nonspecific, likely   representing moderate to severe chronic microvascular changes. Normal T2   flow-voids are seen within  the intracranial vasculature. The lateral   ventricles and cortical sulci are age-appropriate in size and   configuration. There is no mass, mass effect, or extra-axial fluid   collection.    Multiple small susceptibility artifacts in left cerebellum, bilateral   occipital lobes, left worse than right, left parietal lobe, bilateral   frontallobes. Differential diagnosis includes cavernoma's, chronic   hypertensive microhemorrhages, amyloid angiopathy. Midline structures are   normal.    The patient is status post bilateral ocular lens replacement surgery.   Mild inflammatory mucosal changes are seen throughout the various   portions of the paranasal sinuses. The orbits and mastoid air cells are   unremarkable.    IMPRESSION: No acute infarction.    --- End of Report ---            BARBARA HURLEY MD; Attending Radiologist  This document has been electronically signed. Sep 30 2024 10:39PM    < end of copied text >  
Neurology      S: patient seen. no sigificant neuro change      Medications: MEDICATIONS  (STANDING):  AQUAPHOR (petrolatum Ointment) 1 Application(s) Topical daily  aspirin enteric coated 81 milliGRAM(s) Oral daily  cefTRIAXone   IVPB 1000 milliGRAM(s) IV Intermittent every 24 hours  cefTRIAXone   IVPB      cetirizine 10 milliGRAM(s) Oral daily  cyanocobalamin 1000 MICROGram(s) Oral daily  dextrose 5% + sodium chloride 0.45%. 1000 milliLiter(s) (50 mL/Hr) IV Continuous <Continuous>  donepezil 5 milliGRAM(s) Oral at bedtime  folic acid 1 milliGRAM(s) Oral daily  hydrALAZINE 10 milliGRAM(s) Oral two times a day  influenza  Vaccine (HIGH DOSE) 0.5 milliLiter(s) IntraMuscular once  memantine 5 milliGRAM(s) Oral daily  pyridoxine 100 milliGRAM(s) Oral daily  rosuvastatin 5 milliGRAM(s) Oral at bedtime    MEDICATIONS  (PRN):  acetaminophen     Tablet .. 650 milliGRAM(s) Oral every 6 hours PRN Temp greater or equal to 38C (100.4F), Mild Pain (1 - 3)  aluminum hydroxide/magnesium hydroxide/simethicone Suspension 30 milliLiter(s) Oral every 4 hours PRN Dyspepsia  melatonin 3 milliGRAM(s) Oral at bedtime PRN Insomnia  ondansetron Injectable 4 milliGRAM(s) IV Push every 8 hours PRN Nausea and/or Vomiting       Vitals:  Vital Signs Last 24 Hrs  T(C): 36.7 (08 Oct 2024 05:21), Max: 37.2 (07 Oct 2024 20:31)  T(F): 98.1 (08 Oct 2024 05:21), Max: 99 (07 Oct 2024 20:31)  HR: 71 (08 Oct 2024 05:21) (70 - 99)  BP: 168/85 (08 Oct 2024 05:21) (143/76 - 168/85)  BP(mean): --  RR: 18 (08 Oct 2024 05:21) (18 - 18)  SpO2: 94% (08 Oct 2024 05:21) (94% - 98%)    Parameters below as of 08 Oct 2024 05:21  Patient On (Oxygen Delivery Method): room air          General Exam:   General Appearance: Appropriately dressed and in no acute distress       Head: Normocephalic, atraumatic and no dysmorphic features  Ear, Nose, and Throat: Moist mucous membranes  CVS: S1S2+  Resp: No SOB, no wheeze or rhonchi  GI: soft NT/ND  Extremities: No edema or cyanosis  Skin: No bruises or rashes     Neurological Exam:  Mental Status: Awake, alert and oriented x1  Able to follow simple and complex verbal commands. Able to name and repeat. fluent speech. No obvious aphasia or dysarthria noted.   Cranial Nerves: PERRL, EOMI, VFFC, sensation V1-V3 intact,  no obvious facial asymmetry, equal elevation of palate, scm/trap 5/5, tongue is midline on protrusion. no obvious papilledema on fundoscopic exam. hearing is grossly intact.   Motor: Normal bulk, tone and strength throughout but moving uppers 5/5 > lowers 3/5   Sensation: Intact to light touch and pinprick throughout. no right/left confusion. no extinction to tactile on DSS.   Reflexes: 1+ throughout at biceps, brachioradialis, triceps, patellars and ankles bilaterally and equal. No clonus. R toe and L toe were both downgoing.  Coordination: No dysmetria on FNF     Gait: deferred     Data/Labs/Imaging which I personally reviewed.       LABS:                          12.5   11.08 )-----------( 347      ( 08 Oct 2024 06:34 )             40.1     10-08    139  |  103  |  18  ----------------------------<  83  3.8   |  25  |  0.91    Ca    9.2      08 Oct 2024 06:34    TPro  7.1  /  Alb  3.4  /  TBili  0.7  /  DBili  x   /  AST  25  /  ALT  22  /  AlkPhos  99  10-08    LIVER FUNCTIONS - ( 08 Oct 2024 06:34 )  Alb: 3.4 g/dL / Pro: 7.1 g/dL / ALK PHOS: 99 U/L / ALT: 22 U/L / AST: 25 U/L / GGT: x           PT/INR - ( 07 Oct 2024 09:13 )   PT: 12.0 sec;   INR: 1.04 ratio           Urinalysis Basic - ( 08 Oct 2024 06:34 )    Color: x / Appearance: x / SG: x / pH: x  Gluc: 83 mg/dL / Ketone: x  / Bili: x / Urobili: x   Blood: x / Protein: x / Nitrite: x   Leuk Esterase: x / RBC: x / WBC x   Sq Epi: x / Non Sq Epi: x / Bacteria: x              < from: CT Head No Cont (09.29.24 @ 16:17) >    ACC: [id number] EXAM:  CT BRAIN   ORDERED BY:  YOVANY VALDES     PROCEDURE DATE:  09/29/2024          INTERPRETATION:  CLINICAL INFORMATION: AMS, slumping to the right    COMPARISON: None.    CONTRAST:  IV Contrast: NONE  Complications: None reportedat time of study completion    TECHNIQUE:  Serial axial images were obtained from the skull base to the   vertex using multi-slice helical technique. Sagittal and coronal   reformats were obtained.    FINDINGS:  Examination limited due to patient motion.    VENTRICLES AND SULCI: Age appropriate involutional changes.  INTRA-AXIAL: No mass effect, acute hemorrhage, or midline shift.  There   are periventricular and subcortical white matter hypodensities,   consistent with microvascular type changes.  EXTRA-AXIAL: No mass or fluid collection. Basal cisterns are normal in   appearance.    VISUALIZED SINUSES:  Trace right maxillary sinus mucosal thickening.  TYMPANOMASTOID CAVITIES:  Clear.  VISUALIZED ORBITS: Bilateral lens replacement.  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 Hyperostosis frontalis interna.    MISCELLANEOUS: None.      IMPRESSION:  No acute intracranial hemorrhage, mass effect, or midline shift.       < from: MR Head No Cont (09.30.24 @ 22:24) >    ACC: [id number] EXAM:  MR BRAIN   ORDERED BY:  LEWIS MARTINEZ     PROCEDURE DATE:  09/30/2024          INTERPRETATION:  CLINICAL INDICATIONS: ams    COMPARISON: Head CT dated 9/29/2024    TECHNIQUE: MRI brain: Multiplanar, multisequence MR imaging of the brain   are obtained without the administration of intravenous Gadavist contrast.    FINDINGS:  There is no abnormal restricted diffusion to suggest acute infarction.   Scattered periventricular and subcortical white matter T2 /FLAIR   hyperintensities are seen without mass effect, nonspecific, likely   representing moderate to severe chronic microvascular changes. Normal T2   flow-voids are seen within  the intracranial vasculature. The lateral   ventricles and cortical sulci are age-appropriate in size and   configuration. There is no mass, mass effect, or extra-axial fluid   collection.    Multiple small susceptibility artifacts in left cerebellum, bilateral   occipital lobes, left worse than right, left parietal lobe, bilateral   frontallobes. Differential diagnosis includes cavernoma's, chronic   hypertensive microhemorrhages, amyloid angiopathy. Midline structures are   normal.    The patient is status post bilateral ocular lens replacement surgery.   Mild inflammatory mucosal changes are seen throughout the various   portions of the paranasal sinuses. The orbits and mastoid air cells are   unremarkable.    IMPRESSION: No acute infarction.    --- End of Report ---               < from: MR Head No Cont (09.30.24 @ 22:24) >    ACC: [id number] EXAM:  MR BRAIN   ORDERED BY:  LEWIS MARTINEZ     PROCEDURE DATE:  09/30/2024          INTERPRETATION:  CLINICAL INDICATIONS: ams    COMPARISON: Head CT dated 9/29/2024    TECHNIQUE: MRI brain: Multiplanar, multisequence MR imaging of the brain   are obtained without the administration of intravenous Gadavist contrast.    FINDINGS:  There is no abnormal restricted diffusion to suggest acute infarction.   Scattered periventricular and subcortical white matter T2 /FLAIR   hyperintensities are seen without mass effect, nonspecific, likely   representing moderate to severe chronic microvascular changes. Normal T2   flow-voids are seen within  the intracranial vasculature. The lateral   ventricles and cortical sulci are age-appropriate in size and   configuration. There is no mass, mass effect, or extra-axial fluid   collection.    Multiple small susceptibility artifacts in left cerebellum, bilateral   occipital lobes, left worse than right, left parietal lobe, bilateral   frontallobes. Differential diagnosis includes cavernoma's, chronic   hypertensive microhemorrhages, amyloid angiopathy. Midline structures are   normal.    The patient is status post bilateral ocular lens replacement surgery.   Mild inflammatory mucosal changes are seen throughout the various   portions of the paranasal sinuses. The orbits and mastoid air cells are   unremarkable.    IMPRESSION: No acute infarction.    --- End of Report ---            BARBARA HURLEY MD; Attending Radiologist  This document has been electronically signed. Sep 30 2024 10:39PM    < end of copied text >  
[facility] Division of Hospital Medicine  Lizzy Erazo MD  Available via MS Teams      SUBJECTIVE / OVERNIGHT EVENTS: No acute events overnight. pt with EEG monitor on    ADDITIONAL REVIEW OF SYSTEMS: Negative on review    MEDICATIONS  (STANDING):  AQUAPHOR (petrolatum Ointment) 1 Application(s) Topical daily  aspirin enteric coated 81 milliGRAM(s) Oral daily  cetirizine 10 milliGRAM(s) Oral daily  cyanocobalamin 1000 MICROGram(s) Oral daily  donepezil 5 milliGRAM(s) Oral at bedtime  folic acid 1 milliGRAM(s) Oral daily  heparin   Injectable 5000 Unit(s) SubCutaneous once  hydrALAZINE 10 milliGRAM(s) Oral two times a day  influenza  Vaccine (HIGH DOSE) 0.5 milliLiter(s) IntraMuscular once  memantine 5 milliGRAM(s) Oral daily  pyridoxine 100 milliGRAM(s) Oral daily  rosuvastatin 5 milliGRAM(s) Oral at bedtime    MEDICATIONS  (PRN):  acetaminophen     Tablet .. 650 milliGRAM(s) Oral every 6 hours PRN Temp greater or equal to 38C (100.4F), Mild Pain (1 - 3)  aluminum hydroxide/magnesium hydroxide/simethicone Suspension 30 milliLiter(s) Oral every 4 hours PRN Dyspepsia  melatonin 3 milliGRAM(s) Oral at bedtime PRN Insomnia  ondansetron Injectable 4 milliGRAM(s) IV Push every 8 hours PRN Nausea and/or Vomiting      I&O's Summary    02 Oct 2024 07:01  -  03 Oct 2024 07:00  --------------------------------------------------------  IN: 200 mL / OUT: 0 mL / NET: 200 mL    03 Oct 2024 07:01  -  03 Oct 2024 17:42  --------------------------------------------------------  IN: 240 mL / OUT: 0 mL / NET: 240 mL        PHYSICAL EXAM:  Vital Signs Last 24 Hrs  T(C): 36.7 (03 Oct 2024 12:15), Max: 36.9 (02 Oct 2024 20:49)  T(F): 98.1 (03 Oct 2024 12:15), Max: 98.5 (02 Oct 2024 20:49)  HR: 71 (03 Oct 2024 14:58) (56 - 94)  BP: 163/77 (03 Oct 2024 17:20) (98/63 - 163/77)  BP(mean): --  RR: 19 (03 Oct 2024 12:15) (18 - 19)  SpO2: 96% (03 Oct 2024 14:58) (95% - 96%)    Parameters below as of 03 Oct 2024 14:58  Patient On (Oxygen Delivery Method): room air      CONSTITUTIONAL: NAD, well-developed, well-groomed, [place]  RESPIRATORY: Normal respiratory effort; lungs are clear to auscultation bilaterally  CARDIOVASCULAR: Regular rate and rhythm, normal S1 and S2, +murmur  ABDOMEN: Nontender to palpation, nondistended, soft  PSYCH: A+O to person, place  confused    LABS:                        RADIOLOGY & ADDITIONAL TESTS:  New Imaging Personally Reviewed Today:  New Electrocardiogram Personally Reviewed Today:  Other Results Reviewed Today:   Prior or Outpatient Records Reviewed Today with Summary:    COORDINATION OF CARE:  Consultant Communication and Details of Discussion (where applicable):    
[facility] Division of Hospital Medicine  Parth Adams DO  Pager (M-F, 8A-5P):  MS Teams PREFERRED        SUBJECTIVE / OVERNIGHT EVENTS:  Patient seen at bedside in no acute distress  No overnight events.     MEDICATIONS  (STANDING):  AQUAPHOR (petrolatum Ointment) 1 Application(s) Topical daily  aspirin enteric coated 81 milliGRAM(s) Oral daily  cefTRIAXone   IVPB 1000 milliGRAM(s) IV Intermittent every 24 hours  cefTRIAXone   IVPB      cetirizine 10 milliGRAM(s) Oral daily  cyanocobalamin 1000 MICROGram(s) Oral daily  dextrose 5% + sodium chloride 0.45%. 1000 milliLiter(s) (50 mL/Hr) IV Continuous <Continuous>  donepezil 5 milliGRAM(s) Oral at bedtime  folic acid 1 milliGRAM(s) Oral daily  hydrALAZINE 10 milliGRAM(s) Oral two times a day  influenza  Vaccine (HIGH DOSE) 0.5 milliLiter(s) IntraMuscular once  memantine 5 milliGRAM(s) Oral daily  pyridoxine 100 milliGRAM(s) Oral daily  rosuvastatin 5 milliGRAM(s) Oral at bedtime    MEDICATIONS  (PRN):  acetaminophen     Tablet .. 650 milliGRAM(s) Oral every 6 hours PRN Temp greater or equal to 38C (100.4F), Mild Pain (1 - 3)  aluminum hydroxide/magnesium hydroxide/simethicone Suspension 30 milliLiter(s) Oral every 4 hours PRN Dyspepsia  melatonin 3 milliGRAM(s) Oral at bedtime PRN Insomnia  ondansetron Injectable 4 milliGRAM(s) IV Push every 8 hours PRN Nausea and/or Vomiting      I&O's Summary    07 Oct 2024 07:01  -  08 Oct 2024 07:00  --------------------------------------------------------  IN: 50 mL / OUT: 100 mL / NET: -50 mL        PHYSICAL EXAM:  Vital Signs Last 24 Hrs  T(C): 36.9 (08 Oct 2024 10:19), Max: 37.2 (07 Oct 2024 20:31)  T(F): 98.5 (08 Oct 2024 10:19), Max: 99 (07 Oct 2024 20:31)  HR: 61 (08 Oct 2024 10:19) (61 - 85)  BP: 137/69 (08 Oct 2024 10:19) (137/69 - 168/85)  BP(mean): --  RR: 18 (08 Oct 2024 10:19) (18 - 18)  SpO2: 95% (08 Oct 2024 10:19) (94% - 96%)    Parameters below as of 08 Oct 2024 10:19  Patient On (Oxygen Delivery Method): room air    CONSTITUTIONAL: NAD, well-developed, well-groomed, [place]  RESPIRATORY: Normal respiratory effort; lungs are clear to auscultation bilaterally  CARDIOVASCULAR: Regular rate and rhythm, normal S1 and S2, +murmur  ABDOMEN: Nontender to palpation, nondistended, soft  PSYCH: A+O to person, place confused, tired      LABS:                        12.5   11.08 )-----------( 347      ( 08 Oct 2024 06:34 )             40.1     10-08    139  |  103  |  18  ----------------------------<  83  3.8   |  25  |  0.91    Ca    9.2      08 Oct 2024 06:34    TPro  7.1  /  Alb  3.4  /  TBili  0.7  /  DBili  x   /  AST  25  /  ALT  22  /  AlkPhos  99  10-08    PT/INR - ( 07 Oct 2024 09:13 )   PT: 12.0 sec;   INR: 1.04 ratio               Urinalysis Basic - ( 08 Oct 2024 06:34 )    Color: x / Appearance: x / SG: x / pH: x  Gluc: 83 mg/dL / Ketone: x  / Bili: x / Urobili: x   Blood: x / Protein: x / Nitrite: x   Leuk Esterase: x / RBC: x / WBC x   Sq Epi: x / Non Sq Epi: x / Bacteria: x          RADIOLOGY & ADDITIONAL TESTS:  Results Reviewed: CBC/CMP personally reviewed by me Hgb 12.5 Cr 0.91  Imaging Personally Reviewed:  Electrocardiogram Personally Reviewed:    COORDINATION OF CARE:  Care Discussed with Consultants/Other Providers [Y/N]: Y  Prior or Outpatient Records Reviewed [Y/N]: Y  
[facility] Division of Hospital Medicine  Parth Adams DO  Pager (M-F, 8A-5P):  MS Teams PREFERRED        SUBJECTIVE / OVERNIGHT EVENTS:  Patient seen at bedside in no acute distress  Requests assistance with positioning(provided with extra pillow and bed position changed)      MEDICATIONS  (STANDING):  AQUAPHOR (petrolatum Ointment) 1 Application(s) Topical daily  aspirin enteric coated 81 milliGRAM(s) Oral daily  cefTRIAXone   IVPB 1000 milliGRAM(s) IV Intermittent every 24 hours  cefTRIAXone   IVPB      cetirizine 10 milliGRAM(s) Oral daily  cyanocobalamin 1000 MICROGram(s) Oral daily  dextrose 5% + sodium chloride 0.45%. 1000 milliLiter(s) (50 mL/Hr) IV Continuous <Continuous>  donepezil 5 milliGRAM(s) Oral at bedtime  folic acid 1 milliGRAM(s) Oral daily  hydrALAZINE 10 milliGRAM(s) Oral two times a day  influenza  Vaccine (HIGH DOSE) 0.5 milliLiter(s) IntraMuscular once  memantine 5 milliGRAM(s) Oral daily  pyridoxine 100 milliGRAM(s) Oral daily  rosuvastatin 5 milliGRAM(s) Oral at bedtime    MEDICATIONS  (PRN):  acetaminophen     Tablet .. 650 milliGRAM(s) Oral every 6 hours PRN Temp greater or equal to 38C (100.4F), Mild Pain (1 - 3)  aluminum hydroxide/magnesium hydroxide/simethicone Suspension 30 milliLiter(s) Oral every 4 hours PRN Dyspepsia  melatonin 3 milliGRAM(s) Oral at bedtime PRN Insomnia  ondansetron Injectable 4 milliGRAM(s) IV Push every 8 hours PRN Nausea and/or Vomiting      I&O's Summary    08 Oct 2024 07:01  -  09 Oct 2024 07:00  --------------------------------------------------------  IN: 120 mL / OUT: 0 mL / NET: 120 mL        PHYSICAL EXAM:  Vital Signs Last 24 Hrs  T(C): 36.7 (09 Oct 2024 04:54), Max: 36.7 (08 Oct 2024 20:27)  T(F): 98.1 (09 Oct 2024 04:54), Max: 98.1 (08 Oct 2024 20:27)  HR: 59 (09 Oct 2024 04:54) (59 - 87)  BP: 145/87 (09 Oct 2024 04:54) (113/74 - 146/85)  BP(mean): --  RR: 18 (09 Oct 2024 04:54) (18 - 18)  SpO2: 96% (09 Oct 2024 04:54) (94% - 96%)    Parameters below as of 09 Oct 2024 04:54  Patient On (Oxygen Delivery Method): room air      CONSTITUTIONAL: NAD, well-developed, well-groomed, [place]  RESPIRATORY: Normal respiratory effort; lungs are clear to auscultation bilaterally  CARDIOVASCULAR: Regular rate and rhythm, normal S1 and S2, +murmur  ABDOMEN: Nontender to palpation, nondistended, soft  PSYCH: A+O to person, place confused, tired    LABS:                        12.8   8.78  )-----------( 344      ( 09 Oct 2024 07:51 )             41.1     10-08    139  |  103  |  18  ----------------------------<  83  3.8   |  25  |  0.91    Ca    9.2      08 Oct 2024 06:34    TPro  7.1  /  Alb  3.4  /  TBili  0.7  /  DBili  x   /  AST  25  /  ALT  22  /  AlkPhos  99  10-08    PT/INR - ( 09 Oct 2024 07:51 )   PT: 14.3 sec;   INR: 1.25 ratio         PTT - ( 09 Oct 2024 07:51 )  PTT:35.7 sec      Urinalysis Basic - ( 08 Oct 2024 06:34 )    Color: x / Appearance: x / SG: x / pH: x  Gluc: 83 mg/dL / Ketone: x  / Bili: x / Urobili: x   Blood: x / Protein: x / Nitrite: x   Leuk Esterase: x / RBC: x / WBC x   Sq Epi: x / Non Sq Epi: x / Bacteria: x          RADIOLOGY & ADDITIONAL TESTS:  Results Reviewed: CBC/CMP personally reviewed by me Hgb 12.8 Cr 0.91  Imaging Personally Reviewed:  Electrocardiogram Personally Reviewed:    COORDINATION OF CARE:  Care Discussed with Consultants/Other Providers [Y/N]: Y  Prior or Outpatient Records Reviewed [Y/N]: Y  
[facility] Division of Hospital Medicine  Lizzy Erazo MD  Available via MS Teams      SUBJECTIVE / OVERNIGHT EVENTS: No acute events overnight pt remains confused    ADDITIONAL REVIEW OF SYSTEMS: Unable to gather d/t confusion    MEDICATIONS  (STANDING):  AQUAPHOR (petrolatum Ointment) 1 Application(s) Topical daily  aspirin enteric coated 81 milliGRAM(s) Oral daily  cetirizine 10 milliGRAM(s) Oral daily  cyanocobalamin 1000 MICROGram(s) Oral daily  dextrose 5% + sodium chloride 0.45%. 1000 milliLiter(s) (75 mL/Hr) IV Continuous <Continuous>  donepezil 5 milliGRAM(s) Oral at bedtime  folic acid 1 milliGRAM(s) Oral daily  hydrALAZINE 10 milliGRAM(s) Oral two times a day  influenza  Vaccine (HIGH DOSE) 0.5 milliLiter(s) IntraMuscular once  memantine 5 milliGRAM(s) Oral daily  pyridoxine 100 milliGRAM(s) Oral daily  rosuvastatin 5 milliGRAM(s) Oral at bedtime    MEDICATIONS  (PRN):  acetaminophen     Tablet .. 650 milliGRAM(s) Oral every 6 hours PRN Temp greater or equal to 38C (100.4F), Mild Pain (1 - 3)  aluminum hydroxide/magnesium hydroxide/simethicone Suspension 30 milliLiter(s) Oral every 4 hours PRN Dyspepsia  melatonin 3 milliGRAM(s) Oral at bedtime PRN Insomnia  ondansetron Injectable 4 milliGRAM(s) IV Push every 8 hours PRN Nausea and/or Vomiting      I&O's Summary    04 Oct 2024 07:01  -  05 Oct 2024 07:00  --------------------------------------------------------  IN: 150 mL / OUT: 400 mL / NET: -250 mL        PHYSICAL EXAM:  Vital Signs Last 24 Hrs  T(C): 36.9 (05 Oct 2024 05:40), Max: 36.9 (05 Oct 2024 05:40)  T(F): 98.4 (05 Oct 2024 05:40), Max: 98.4 (05 Oct 2024 05:40)  HR: 60 (05 Oct 2024 05:40) (60 - 100)  BP: 152/73 (05 Oct 2024 05:40) (134/75 - 162/82)  BP(mean): --  RR: 18 (05 Oct 2024 05:40) (18 - 18)  SpO2: 96% (05 Oct 2024 05:40) (95% - 96%)    Parameters below as of 05 Oct 2024 05:40  Patient On (Oxygen Delivery Method): room air      CONSTITUTIONAL: NAD, well-developed, well-groomed, [place]  RESPIRATORY: Normal respiratory effort; lungs are clear to auscultation bilaterally  CARDIOVASCULAR: Regular rate and rhythm, normal S1 and S2, +murmur  ABDOMEN: Nontender to palpation, nondistended, soft  PSYCH: A+O to person, place confused    LABS:                        12.4   8.33  )-----------( 336      ( 04 Oct 2024 08:51 )             39.7     10-04    141  |  107  |  24[H]  ----------------------------<  86  4.7   |  20[L]  |  0.91    Ca    8.8      04 Oct 2024 06:59    TPro  6.5  /  Alb  3.0[L]  /  TBili  0.6  /  DBili  x   /  AST  36  /  ALT  18  /  AlkPhos  70  10-04    PT/INR - ( 04 Oct 2024 06:59 )   PT: 24.4 sec;   INR: 2.14 ratio         PTT - ( 04 Oct 2024 06:59 )  PTT:97.9 sec      Urinalysis Basic - ( 04 Oct 2024 06:59 )    Color: x / Appearance: x / SG: x / pH: x  Gluc: 86 mg/dL / Ketone: x  / Bili: x / Urobili: x   Blood: x / Protein: x / Nitrite: x   Leuk Esterase: x / RBC: x / WBC x   Sq Epi: x / Non Sq Epi: x / Bacteria: x            RADIOLOGY & ADDITIONAL TESTS:  New Imaging Personally Reviewed Today:  New Electrocardiogram Personally Reviewed Today:  Other Results Reviewed Today:   Prior or Outpatient Records Reviewed Today with Summary:    COORDINATION OF CARE:  Consultant Communication and Details of Discussion (where applicable):    
[facility] Division of Hospital Medicine  Parth Adams DO  Pager (M-F, 8A-5P):  MS Teams PREFERRED        SUBJECTIVE / OVERNIGHT EVENTS:  Patient seen at bedside in no acute distress  Family at bedside  Confused.     MEDICATIONS  (STANDING):  AQUAPHOR (petrolatum Ointment) 1 Application(s) Topical daily  aspirin enteric coated 81 milliGRAM(s) Oral daily  cefTRIAXone   IVPB      cetirizine 10 milliGRAM(s) Oral daily  cyanocobalamin 1000 MICROGram(s) Oral daily  dextrose 5% + sodium chloride 0.45%. 1000 milliLiter(s) (50 mL/Hr) IV Continuous <Continuous>  donepezil 5 milliGRAM(s) Oral at bedtime  folic acid 1 milliGRAM(s) Oral daily  hydrALAZINE 10 milliGRAM(s) Oral two times a day  influenza  Vaccine (HIGH DOSE) 0.5 milliLiter(s) IntraMuscular once  memantine 5 milliGRAM(s) Oral daily  pyridoxine 100 milliGRAM(s) Oral daily  rosuvastatin 5 milliGRAM(s) Oral at bedtime    MEDICATIONS  (PRN):  acetaminophen     Tablet .. 650 milliGRAM(s) Oral every 6 hours PRN Temp greater or equal to 38C (100.4F), Mild Pain (1 - 3)  aluminum hydroxide/magnesium hydroxide/simethicone Suspension 30 milliLiter(s) Oral every 4 hours PRN Dyspepsia  melatonin 3 milliGRAM(s) Oral at bedtime PRN Insomnia  ondansetron Injectable 4 milliGRAM(s) IV Push every 8 hours PRN Nausea and/or Vomiting      I&O's Summary    06 Oct 2024 07:01  -  07 Oct 2024 07:00  --------------------------------------------------------  IN: 0 mL / OUT: 200 mL / NET: -200 mL        PHYSICAL EXAM:  Vital Signs Last 24 Hrs  T(C): 37.1 (07 Oct 2024 11:03), Max: 37.1 (07 Oct 2024 11:03)  T(F): 98.7 (07 Oct 2024 11:03), Max: 98.7 (07 Oct 2024 11:03)  HR: 99 (07 Oct 2024 11:03) (62 - 99)  BP: 143/76 (07 Oct 2024 11:03) (134/82 - 145/85)  BP(mean): --  RR: 18 (07 Oct 2024 11:03) (18 - 18)  SpO2: 98% (07 Oct 2024 11:03) (95% - 98%)    Parameters below as of 07 Oct 2024 11:03  Patient On (Oxygen Delivery Method): room air          LABS:                        12.4   9.35  )-----------( 339      ( 07 Oct 2024 07:22 )             39.9     10-07    139  |  105  |  20  ----------------------------<  95  4.0   |  23  |  1.00    Ca    9.0      07 Oct 2024 07:21    TPro  6.8  /  Alb  3.1[L]  /  TBili  0.6  /  DBili  x   /  AST  28  /  ALT  21  /  AlkPhos  81  10-06    PT/INR - ( 07 Oct 2024 09:13 )   PT: 12.0 sec;   INR: 1.04 ratio         PTT - ( 06 Oct 2024 06:42 )  PTT:33.7 sec      Urinalysis Basic - ( 07 Oct 2024 07:21 )    Color: x / Appearance: x / SG: x / pH: x  Gluc: 95 mg/dL / Ketone: x  / Bili: x / Urobili: x   Blood: x / Protein: x / Nitrite: x   Leuk Esterase: x / RBC: x / WBC x   Sq Epi: x / Non Sq Epi: x / Bacteria: x          RADIOLOGY & ADDITIONAL TESTS:  Results Reviewed: CBC/CMP personally reviewed by me Hgb 12.4 Cr 1.00   Imaging Personally Reviewed:  Electrocardiogram Personally Reviewed:    COORDINATION OF CARE:  Care Discussed with Consultants/Other Providers [Y/N]: Y  Prior or Outpatient Records Reviewed [Y/N]: Y  
[facility] Division of Hospital Medicine  Cortez Garcia DO  Reachable on [employer] Teams    Patient is a 83y old  Female who presents with a chief complaint of AMS (02 Oct 2024 10:29)    SUBJECTIVE / OVERNIGHT EVENTS: No acute events overnight. Patient seen and examined at bedside this morning, unable to obtain ROS due to AMS.    ADDITIONAL REVIEW OF SYSTEMS: Unable to obtain as above.    MEDICATIONS  (STANDING):  aspirin enteric coated 81 milliGRAM(s) Oral daily  cetirizine 10 milliGRAM(s) Oral daily  cyanocobalamin 1000 MICROGram(s) Oral daily  donepezil 5 milliGRAM(s) Oral at bedtime  folic acid 1 milliGRAM(s) Oral daily  heparin   Injectable 5000 Unit(s) SubCutaneous every 8 hours  hydrALAZINE 10 milliGRAM(s) Oral two times a day  memantine 5 milliGRAM(s) Oral daily  pyridoxine 100 milliGRAM(s) Oral daily  rosuvastatin 5 milliGRAM(s) Oral at bedtime    MEDICATIONS  (PRN):  acetaminophen     Tablet .. 650 milliGRAM(s) Oral every 6 hours PRN Temp greater or equal to 38C (100.4F), Mild Pain (1 - 3)  aluminum hydroxide/magnesium hydroxide/simethicone Suspension 30 milliLiter(s) Oral every 4 hours PRN Dyspepsia  melatonin 3 milliGRAM(s) Oral at bedtime PRN Insomnia  ondansetron Injectable 4 milliGRAM(s) IV Push every 8 hours PRN Nausea and/or Vomiting      CAPILLARY BLOOD GLUCOSE      POCT Blood Glucose.: 80 mg/dL (02 Oct 2024 11:04)  POCT Blood Glucose.: 79 mg/dL (02 Oct 2024 06:19)  POCT Blood Glucose.: 86 mg/dL (01 Oct 2024 23:50)  POCT Blood Glucose.: 91 mg/dL (01 Oct 2024 19:00)    I&O's Summary    01 Oct 2024 07:01  -  02 Oct 2024 07:00  --------------------------------------------------------  IN: 240 mL / OUT: 800 mL / NET: -560 mL        PHYSICAL EXAM:  Vital Signs Last 24 Hrs  T(C): 36.6 (02 Oct 2024 10:21), Max: 36.6 (01 Oct 2024 20:00)  T(F): 97.9 (02 Oct 2024 10:21), Max: 97.9 (02 Oct 2024 10:21)  HR: 65 (02 Oct 2024 10:40) (64 - 71)  BP: 142/78 (02 Oct 2024 10:40) (139/64 - 181/90)  BP(mean): --  RR: 18 (02 Oct 2024 10:26) (18 - 18)  SpO2: 98% (02 Oct 2024 10:26) (98% - 100%)    Parameters below as of 02 Oct 2024 10:21  Patient On (Oxygen Delivery Method): room air    CONSTITUTIONAL: NAD, well-developed, well-groomed, [place]  RESPIRATORY: Normal respiratory effort; lungs are clear to auscultation bilaterally  CARDIOVASCULAR: Regular rate and rhythm, normal S1 and S2, +murmur  ABDOMEN: Nontender to palpation, nondistended, soft  PSYCH: A+O to person, place but not time, confused    LABS:                        11.2   9.61  )-----------( 272      ( 01 Oct 2024 07:17 )             36.1     10-01    142  |  107  |  26[H]  ----------------------------<  65[L]  4.3   |  22  |  0.88    Ca    9.1      01 Oct 2024 07:05  Phos  2.8     10-01  Mg     1.9     10-01            Urinalysis Basic - ( 01 Oct 2024 07:05 )    Color: x / Appearance: x / SG: x / pH: x  Gluc: 65 mg/dL / Ketone: x  / Bili: x / Urobili: x   Blood: x / Protein: x / Nitrite: x   Leuk Esterase: x / RBC: x / WBC x   Sq Epi: x / Non Sq Epi: x / Bacteria: x        Culture - Urine (collected 29 Sep 2024 21:08)  Source: Clean Catch Clean Catch (Midstream)  Final Report (01 Oct 2024 14:25):    <10,000 CFU/mL Normal Urogenital Gabbi
[facility] Division of Hospital Medicine  Parth Adams DO  Pager (M-F, 8A-5P):  MS Teams PREFERRED        SUBJECTIVE / OVERNIGHT EVENTS:  Patient seen at bedside in no acute distress  Pt removed IV overnight per nursing staff      MEDICATIONS  (STANDING):  AQUAPHOR (petrolatum Ointment) 1 Application(s) Topical daily  aspirin enteric coated 81 milliGRAM(s) Oral daily  cefuroxime   Tablet 500 milliGRAM(s) Oral every 12 hours  cetirizine 10 milliGRAM(s) Oral daily  cyanocobalamin 1000 MICROGram(s) Oral daily  dextrose 5% + sodium chloride 0.45%. 1000 milliLiter(s) (50 mL/Hr) IV Continuous <Continuous>  donepezil 5 milliGRAM(s) Oral at bedtime  folic acid 1 milliGRAM(s) Oral daily  hydrALAZINE 10 milliGRAM(s) Oral two times a day  influenza  Vaccine (HIGH DOSE) 0.5 milliLiter(s) IntraMuscular once  memantine 5 milliGRAM(s) Oral daily  pyridoxine 100 milliGRAM(s) Oral daily  rosuvastatin 5 milliGRAM(s) Oral at bedtime    MEDICATIONS  (PRN):  acetaminophen     Tablet .. 650 milliGRAM(s) Oral every 6 hours PRN Temp greater or equal to 38C (100.4F), Mild Pain (1 - 3)  aluminum hydroxide/magnesium hydroxide/simethicone Suspension 30 milliLiter(s) Oral every 4 hours PRN Dyspepsia  melatonin 3 milliGRAM(s) Oral at bedtime PRN Insomnia  ondansetron Injectable 4 milliGRAM(s) IV Push every 8 hours PRN Nausea and/or Vomiting      I&O's Summary    10 Oct 2024 07:01  -  11 Oct 2024 07:00  --------------------------------------------------------  IN: 60 mL / OUT: 200 mL / NET: -140 mL    11 Oct 2024 07:01  -  11 Oct 2024 15:18  --------------------------------------------------------  IN: 400 mL / OUT: 0 mL / NET: 400 mL        PHYSICAL EXAM:  Vital Signs Last 24 Hrs  T(C): 36.1 (11 Oct 2024 12:48), Max: 37.2 (10 Oct 2024 15:46)  T(F): 97 (11 Oct 2024 12:48), Max: 98.9 (10 Oct 2024 15:46)  HR: 58 (11 Oct 2024 12:48) (58 - 103)  BP: 153/80 (11 Oct 2024 12:48) (138/68 - 171/80)  BP(mean): --  RR: 18 (11 Oct 2024 12:48) (18 - 18)  SpO2: 95% (11 Oct 2024 12:48) (95% - 100%)    Parameters below as of 11 Oct 2024 12:48  Patient On (Oxygen Delivery Method): room air      CONSTITUTIONAL: NAD, well-developed, well-groomed  RESPIRATORY: Normal respiratory effort; lungs are clear to auscultation bilaterally  CARDIOVASCULAR: Regular rate and rhythm, normal S1 and S2, no murmur/rub/gallop; No lower extremity edema; Peripheral pulses are 2+ bilaterally  MUSCULOSKELETAL:  no clubbing or cyanosis of digits; no joint swelling or tenderness to palpation  PSYCH: A+O to person,  NEUROLOGY:  no gross sensory deficits   SKIN: No rashes; no palpable lesions    LABS:                        11.4   10.13 )-----------( 330      ( 10 Oct 2024 07:23 )             36.8     10-10    140  |  105  |  22  ----------------------------<  72  4.2   |  23  |  1.07    Ca    9.1      10 Oct 2024 07:23    TPro  6.5  /  Alb  3.0[L]  /  TBili  0.5  /  DBili  x   /  AST  23  /  ALT  16  /  AlkPhos  91  10-10    PT/INR - ( 10 Oct 2024 07:23 )   PT: 14.9 sec;   INR: 1.31 ratio         PTT - ( 10 Oct 2024 07:23 )  PTT:34.2 sec      Urinalysis Basic - ( 10 Oct 2024 07:23 )    Color: x / Appearance: x / SG: x / pH: x  Gluc: 72 mg/dL / Ketone: x  / Bili: x / Urobili: x   Blood: x / Protein: x / Nitrite: x   Leuk Esterase: x / RBC: x / WBC x   Sq Epi: x / Non Sq Epi: x / Bacteria: x        Culture - CSF with Gram Stain (collected 10 Oct 2024 12:07)  Source: .CSF CSF  Gram Stain (10 Oct 2024 18:38):    No polymorphonuclear cells seen    No organisms seen    by cytocentrifuge        RADIOLOGY & ADDITIONAL TESTS:  Results Reviewed:   Imaging Personally Reviewed:  Electrocardiogram Personally Reviewed:    COORDINATION OF CARE:  Care Discussed with Consultants/Other Providers [Y/N]:  Prior or Outpatient Records Reviewed [Y/N]:  
[facility] Division of Hospital Medicine  Parth Adams DO  Pager (M-F, 8A-5P):  MS Teams PREFERRED  Patient seen at bedside in no acute distress        SUBJECTIVE / OVERNIGHT EVENTS:  no overnight events  Pt arousable during morning rounds, confused but redirectable.    MEDICATIONS  (STANDING):  AQUAPHOR (petrolatum Ointment) 1 Application(s) Topical daily  aspirin enteric coated 81 milliGRAM(s) Oral daily  cetirizine 10 milliGRAM(s) Oral daily  cyanocobalamin 1000 MICROGram(s) Oral daily  dextrose 5% + sodium chloride 0.45%. 1000 milliLiter(s) (50 mL/Hr) IV Continuous <Continuous>  donepezil 5 milliGRAM(s) Oral at bedtime  folic acid 1 milliGRAM(s) Oral daily  hydrALAZINE 10 milliGRAM(s) Oral two times a day  influenza  Vaccine (HIGH DOSE) 0.5 milliLiter(s) IntraMuscular once  memantine 5 milliGRAM(s) Oral daily  pyridoxine 100 milliGRAM(s) Oral daily  rosuvastatin 5 milliGRAM(s) Oral at bedtime    MEDICATIONS  (PRN):  acetaminophen     Tablet .. 650 milliGRAM(s) Oral every 6 hours PRN Temp greater or equal to 38C (100.4F), Mild Pain (1 - 3)  aluminum hydroxide/magnesium hydroxide/simethicone Suspension 30 milliLiter(s) Oral every 4 hours PRN Dyspepsia  melatonin 3 milliGRAM(s) Oral at bedtime PRN Insomnia  ondansetron Injectable 4 milliGRAM(s) IV Push every 8 hours PRN Nausea and/or Vomiting      I&O's Summary    12 Oct 2024 07:01  -  13 Oct 2024 07:00  --------------------------------------------------------  IN: 476 mL / OUT: 300 mL / NET: 176 mL        PHYSICAL EXAM:  Vital Signs Last 24 Hrs  T(C): 36.4 (13 Oct 2024 12:54), Max: 37.1 (13 Oct 2024 04:50)  T(F): 97.5 (13 Oct 2024 12:54), Max: 98.7 (13 Oct 2024 04:50)  HR: 67 (13 Oct 2024 12:54) (63 - 70)  BP: 135/69 (13 Oct 2024 12:54) (132/68 - 150/73)  BP(mean): --  RR: 18 (13 Oct 2024 12:54) (18 - 18)  SpO2: 94% (13 Oct 2024 12:54) (92% - 94%)    Parameters below as of 13 Oct 2024 12:54  Patient On (Oxygen Delivery Method): room air      CONSTITUTIONAL: NAD, well-developed, well-groomed  RESPIRATORY: Normal respiratory effort; lungs are clear to auscultation bilaterally  CARDIOVASCULAR: Regular rate and rhythm, normal S1 and S2, no murmur/rub/gallop; No lower extremity edema; Peripheral pulses are 2+ bilaterally  MUSCULOSKELETAL:  no clubbing or cyanosis of digits; no joint swelling or tenderness to palpation  PSYCH: A+O to person,  NEUROLOGY:  no gross sensory deficits   SKIN: No rashes; no palpable lesions        LABS:                        11.9   8.84  )-----------( 360      ( 12 Oct 2024 08:31 )             38.6     10-12    144  |  106  |  24[H]  ----------------------------<  75  4.0   |  25  |  1.03    Ca    9.2      12 Oct 2024 08:33            Urinalysis Basic - ( 12 Oct 2024 08:33 )    Color: x / Appearance: x / SG: x / pH: x  Gluc: 75 mg/dL / Ketone: x  / Bili: x / Urobili: x   Blood: x / Protein: x / Nitrite: x   Leuk Esterase: x / RBC: x / WBC x   Sq Epi: x / Non Sq Epi: x / Bacteria: x          RADIOLOGY & ADDITIONAL TESTS:  Results Reviewed:   Imaging Personally Reviewed:  Electrocardiogram Personally Reviewed:    COORDINATION OF CARE:  Care Discussed with Consultants/Other Providers [Y/N]:  Prior or Outpatient Records Reviewed [Y/N]:  
[facility] Division of Hospital Medicine  Parth Adams DO  Pager (M-F, 8A-5P):  MS Teams PREFERRED        SUBJECTIVE / OVERNIGHT EVENTS:  Pt seen at bedside during morning rounds  Being prepped for Lumbar Puncture  No events overnight.     MEDICATIONS  (STANDING):  AQUAPHOR (petrolatum Ointment) 1 Application(s) Topical daily  aspirin enteric coated 81 milliGRAM(s) Oral daily  cetirizine 10 milliGRAM(s) Oral daily  cyanocobalamin 1000 MICROGram(s) Oral daily  dextrose 5% + sodium chloride 0.45%. 1000 milliLiter(s) (50 mL/Hr) IV Continuous <Continuous>  donepezil 5 milliGRAM(s) Oral at bedtime  folic acid 1 milliGRAM(s) Oral daily  hydrALAZINE 10 milliGRAM(s) Oral two times a day  influenza  Vaccine (HIGH DOSE) 0.5 milliLiter(s) IntraMuscular once  memantine 5 milliGRAM(s) Oral daily  pyridoxine 100 milliGRAM(s) Oral daily  rosuvastatin 5 milliGRAM(s) Oral at bedtime    MEDICATIONS  (PRN):  acetaminophen     Tablet .. 650 milliGRAM(s) Oral every 6 hours PRN Temp greater or equal to 38C (100.4F), Mild Pain (1 - 3)  aluminum hydroxide/magnesium hydroxide/simethicone Suspension 30 milliLiter(s) Oral every 4 hours PRN Dyspepsia  melatonin 3 milliGRAM(s) Oral at bedtime PRN Insomnia  ondansetron Injectable 4 milliGRAM(s) IV Push every 8 hours PRN Nausea and/or Vomiting      I&O's Summary      PHYSICAL EXAM:  Vital Signs Last 24 Hrs  T(C): 36.2 (10 Oct 2024 12:30), Max: 36.7 (10 Oct 2024 04:58)  T(F): 97.2 (10 Oct 2024 12:30), Max: 98.1 (10 Oct 2024 04:58)  HR: 60 (10 Oct 2024 12:30) (54 - 83)  BP: 158/76 (10 Oct 2024 12:30) (127/67 - 171/79)  BP(mean): 111 (10 Oct 2024 12:15) (89 - 114)  RR: 16 (10 Oct 2024 12:30) (15 - 18)  SpO2: 100% (10 Oct 2024 12:30) (94% - 100%)    Parameters below as of 10 Oct 2024 11:30  Patient On (Oxygen Delivery Method): room air    CONSTITUTIONAL: NAD, well-developed, well-groomed, [place]  RESPIRATORY: Normal respiratory effort; lungs are clear to auscultation bilaterally  CARDIOVASCULAR: Regular rate and rhythm, normal S1 and S2, +murmur  ABDOMEN: Nontender to palpation, nondistended, soft  PSYCH: A+O to person, place confused, tired    LABS:                        11.4   10.13 )-----------( 330      ( 10 Oct 2024 07:23 )             36.8     10-10    140  |  105  |  22  ----------------------------<  72  4.2   |  23  |  1.07    Ca    9.1      10 Oct 2024 07:23    TPro  6.5  /  Alb  3.0[L]  /  TBili  0.5  /  DBili  x   /  AST  23  /  ALT  16  /  AlkPhos  91  10-10    PT/INR - ( 10 Oct 2024 07:23 )   PT: 14.9 sec;   INR: 1.31 ratio         PTT - ( 10 Oct 2024 07:23 )  PTT:34.2 sec      Urinalysis Basic - ( 10 Oct 2024 07:23 )    Color: x / Appearance: x / SG: x / pH: x  Gluc: 72 mg/dL / Ketone: x  / Bili: x / Urobili: x   Blood: x / Protein: x / Nitrite: x   Leuk Esterase: x / RBC: x / WBC x   Sq Epi: x / Non Sq Epi: x / Bacteria: x          RADIOLOGY & ADDITIONAL TESTS:  Results Reviewed:   Imaging Personally Reviewed:  Electrocardiogram Personally Reviewed:    COORDINATION OF CARE:  Care Discussed with Consultants/Other Providers [Y/N]:  Prior or Outpatient Records Reviewed [Y/N]:  
[facility] Division of Hospital Medicine  Cortez Garcia DO  Reachable on [email or web address] Teams    Patient is a 83y old  Female who presents with a chief complaint of AMS (01 Oct 2024 09:34)    SUBJECTIVE / OVERNIGHT EVENTS: No acute events overnight. Patient seen and examined at bedside this morning, unable to obtain review of systems due to AMS.    ADDITIONAL REVIEW OF SYSTEMS: Unable to obtain due to AMS.    MEDICATIONS  (STANDING):  aspirin enteric coated 81 milliGRAM(s) Oral daily  cetirizine 10 milliGRAM(s) Oral daily  cyanocobalamin 1000 MICROGram(s) Oral daily  donepezil 5 milliGRAM(s) Oral at bedtime  folic acid 1 milliGRAM(s) Oral daily  heparin   Injectable 5000 Unit(s) SubCutaneous every 8 hours  hydrALAZINE 10 milliGRAM(s) Oral two times a day  memantine 5 milliGRAM(s) Oral daily  pyridoxine 100 milliGRAM(s) Oral daily  rosuvastatin 5 milliGRAM(s) Oral at bedtime    MEDICATIONS  (PRN):  acetaminophen     Tablet .. 650 milliGRAM(s) Oral every 6 hours PRN Temp greater or equal to 38C (100.4F), Mild Pain (1 - 3)  aluminum hydroxide/magnesium hydroxide/simethicone Suspension 30 milliLiter(s) Oral every 4 hours PRN Dyspepsia  melatonin 3 milliGRAM(s) Oral at bedtime PRN Insomnia  ondansetron Injectable 4 milliGRAM(s) IV Push every 8 hours PRN Nausea and/or Vomiting      CAPILLARY BLOOD GLUCOSE        I&O's Summary    30 Sep 2024 07:01  -  01 Oct 2024 07:00  --------------------------------------------------------  IN: 200 mL / OUT: 650 mL / NET: -450 mL        PHYSICAL EXAM:  Vital Signs Last 24 Hrs  T(C): 36.6 (01 Oct 2024 09:57), Max: 36.9 (30 Sep 2024 14:58)  T(F): 97.9 (01 Oct 2024 09:57), Max: 98.5 (30 Sep 2024 14:58)  HR: 87 (01 Oct 2024 09:57) (65 - 87)  BP: 179/83 (01 Oct 2024 09:57) (144/62 - 179/83)  BP(mean): --  RR: 18 (01 Oct 2024 09:57) (18 - 18)  SpO2: 100% (01 Oct 2024 09:57) (94% - 100%)    Parameters below as of 01 Oct 2024 09:57  Patient On (Oxygen Delivery Method): room air    CONSTITUTIONAL: NAD, well-developed, well-groomed, hard of hearing  RESPIRATORY: Normal respiratory effort; lungs are clear to auscultation bilaterally  CARDIOVASCULAR: Regular rate and rhythm, normal S1 and S2, +murmur  ABDOMEN: Nontender to palpation, nondistended, soft  PSYCH: A+O to person, not place or time, confused    LABS:                        11.2   9.61  )-----------( 272      ( 01 Oct 2024 07:17 )             36.1     10-01    142  |  107  |  26[H]  ----------------------------<  65[L]  4.3   |  22  |  0.88    Ca    9.1      01 Oct 2024 07:05  Phos  2.8     10-01  Mg     1.9     10-01    TPro  7.4  /  Alb  3.7  /  TBili  0.5  /  DBili  x   /  AST  23  /  ALT  16  /  AlkPhos  64  09-29          Urinalysis Basic - ( 01 Oct 2024 07:05 )    Color: x / Appearance: x / SG: x / pH: x  Gluc: 65 mg/dL / Ketone: x  / Bili: x / Urobili: x   Blood: x / Protein: x / Nitrite: x   Leuk Esterase: x / RBC: x / WBC x   Sq Epi: x / Non Sq Epi: x / Bacteria: x  
[facility] Division of Hospital Medicine  Lizzy Erazo MD  Available via MS Teams      SUBJECTIVE / OVERNIGHT EVENTS: No acute events overnight. Pt remains alert but confused     ADDITIONAL REVIEW OF SYSTEMS: unable to gather    MEDICATIONS  (STANDING):  AQUAPHOR (petrolatum Ointment) 1 Application(s) Topical daily  aspirin enteric coated 81 milliGRAM(s) Oral daily  cetirizine 10 milliGRAM(s) Oral daily  cyanocobalamin 1000 MICROGram(s) Oral daily  dextrose 5% + sodium chloride 0.45%. 1000 milliLiter(s) (75 mL/Hr) IV Continuous <Continuous>  donepezil 5 milliGRAM(s) Oral at bedtime  folic acid 1 milliGRAM(s) Oral daily  hydrALAZINE 10 milliGRAM(s) Oral two times a day  influenza  Vaccine (HIGH DOSE) 0.5 milliLiter(s) IntraMuscular once  memantine 5 milliGRAM(s) Oral daily  phytonadione   Solution 5 milliGRAM(s) Oral once  pyridoxine 100 milliGRAM(s) Oral daily  rosuvastatin 5 milliGRAM(s) Oral at bedtime    MEDICATIONS  (PRN):  acetaminophen     Tablet .. 650 milliGRAM(s) Oral every 6 hours PRN Temp greater or equal to 38C (100.4F), Mild Pain (1 - 3)  aluminum hydroxide/magnesium hydroxide/simethicone Suspension 30 milliLiter(s) Oral every 4 hours PRN Dyspepsia  melatonin 3 milliGRAM(s) Oral at bedtime PRN Insomnia  ondansetron Injectable 4 milliGRAM(s) IV Push every 8 hours PRN Nausea and/or Vomiting      I&O's Summary    03 Oct 2024 07:01  -  04 Oct 2024 07:00  --------------------------------------------------------  IN: 360 mL / OUT: 200 mL / NET: 160 mL    04 Oct 2024 07:01  -  04 Oct 2024 13:56  --------------------------------------------------------  IN: 0 mL / OUT: 400 mL / NET: -400 mL        PHYSICAL EXAM:  Vital Signs Last 24 Hrs  T(C): 36.7 (04 Oct 2024 11:20), Max: 37.1 (03 Oct 2024 20:11)  T(F): 98.1 (04 Oct 2024 11:20), Max: 98.8 (03 Oct 2024 20:11)  HR: 100 (04 Oct 2024 11:20) (55 - 100)  BP: 134/75 (04 Oct 2024 11:20) (98/63 - 166/80)  BP(mean): --  RR: 18 (04 Oct 2024 11:20) (18 - 18)  SpO2: 96% (04 Oct 2024 11:20) (92% - 96%)    Parameters below as of 04 Oct 2024 11:20  Patient On (Oxygen Delivery Method): room air      CONSTITUTIONAL: NAD, well-developed, well-groomed, [place]  RESPIRATORY: Normal respiratory effort; lungs are clear to auscultation bilaterally  CARDIOVASCULAR: Regular rate and rhythm, normal S1 and S2, +murmur  ABDOMEN: Nontender to palpation, nondistended, soft  PSYCH: A+O to person, place confused    LABS:                        12.4   8.33  )-----------( 336      ( 04 Oct 2024 08:51 )             39.7     10-04    141  |  107  |  24[H]  ----------------------------<  86  4.7   |  20[L]  |  0.91    Ca    8.8      04 Oct 2024 06:59    TPro  6.5  /  Alb  3.0[L]  /  TBili  0.6  /  DBili  x   /  AST  36  /  ALT  18  /  AlkPhos  70  10-04    PT/INR - ( 04 Oct 2024 06:59 )   PT: 24.4 sec;   INR: 2.14 ratio         PTT - ( 04 Oct 2024 06:59 )  PTT:97.9 sec      Urinalysis Basic - ( 04 Oct 2024 06:59 )    Color: x / Appearance: x / SG: x / pH: x  Gluc: 86 mg/dL / Ketone: x  / Bili: x / Urobili: x   Blood: x / Protein: x / Nitrite: x   Leuk Esterase: x / RBC: x / WBC x   Sq Epi: x / Non Sq Epi: x / Bacteria: x            RADIOLOGY & ADDITIONAL TESTS:  New Imaging Personally Reviewed Today:  New Electrocardiogram Personally Reviewed Today:  Other Results Reviewed Today:   Prior or Outpatient Records Reviewed Today with Summary:    COORDINATION OF CARE:  Consultant Communication and Details of Discussion (where applicable):    
[facility] Division of Hospital Medicine  Cortez Garcia DO  [employer] on [employer] Teams    Patient is a 83y old  Female who presents with a chief complaint of AMS (29 Sep 2024 23:47)    SUBJECTIVE / OVERNIGHT EVENTS: Patient admitted overnight with AMS. Patient seen and examined at bedside this morning, unable to obtain ROS due to AMS.    ADDITIONAL REVIEW OF SYSTEMS: Unable to obtain as above.    MEDICATIONS  (STANDING):  aspirin enteric coated 81 milliGRAM(s) Oral daily  cetirizine 10 milliGRAM(s) Oral daily  donepezil 5 milliGRAM(s) Oral at bedtime  folic acid 1 milliGRAM(s) Oral daily  heparin   Injectable 5000 Unit(s) SubCutaneous every 8 hours  hydrALAZINE 10 milliGRAM(s) Oral two times a day  memantine 5 milliGRAM(s) Oral daily  pyridoxine 100 milliGRAM(s) Oral daily  rosuvastatin 5 milliGRAM(s) Oral at bedtime    MEDICATIONS  (PRN):  acetaminophen     Tablet .. 650 milliGRAM(s) Oral every 6 hours PRN Temp greater or equal to 38C (100.4F), Mild Pain (1 - 3)  aluminum hydroxide/magnesium hydroxide/simethicone Suspension 30 milliLiter(s) Oral every 4 hours PRN Dyspepsia  melatonin 3 milliGRAM(s) Oral at bedtime PRN Insomnia  ondansetron Injectable 4 milliGRAM(s) IV Push every 8 hours PRN Nausea and/or Vomiting      CAPILLARY BLOOD GLUCOSE        I&O's Summary      PHYSICAL EXAM:  Vital Signs Last 24 Hrs  T(C): 37 (30 Sep 2024 04:30), Max: 37.4 (29 Sep 2024 23:12)  T(F): 98.6 (30 Sep 2024 04:30), Max: 99.4 (29 Sep 2024 23:12)  HR: 63 (30 Sep 2024 09:05) (62 - 78)  BP: 176/84 (30 Sep 2024 09:05) (137/70 - 176/84)  BP(mean): 89 (29 Sep 2024 23:12) (89 - 104)  RR: 18 (30 Sep 2024 09:05) (17 - 20)  SpO2: 94% (30 Sep 2024 09:05) (93% - 99%)    Parameters below as of 30 Sep 2024 09:05  Patient On (Oxygen Delivery Method): room air    CONSTITUTIONAL: NAD, well-developed, well-groomed, hard of hearing  RESPIRATORY: Normal respiratory effort; lungs are clear to auscultation bilaterally  CARDIOVASCULAR: Regular rate and rhythm, normal S1 and S2, +murmur  ABDOMEN: Nontender to palpation, nondistended, soft  PSYCH: A+O to person, not place or time, confused    LABS:                        11.6   12.51 )-----------( 275      ( 29 Sep 2024 13:48 )             37.0         142  |  107  |  42[H]  ----------------------------<  108[H]  4.4   |  24  |  1.03    Ca    9.4      29 Sep 2024 13:48    TPro  7.4  /  Alb  3.7  /  TBili  0.5  /  DBili  x   /  AST  23  /  ALT  16  /  AlkPhos  64            Urinalysis Basic - ( 29 Sep 2024 21:08 )    Color: Yellow / Appearance: Cloudy / S.023 / pH: x  Gluc: x / Ketone: Negative mg/dL  / Bili: Negative / Urobili: 1.0 mg/dL   Blood: x / Protein: 100 mg/dL / Nitrite: Negative   Leuk Esterase: Negative / RBC: 1 /HPF / WBC 0 /HPF   Sq Epi: x / Non Sq Epi: 2 /HPF / Bacteria: Negative /HPF  
[facility] Division of Hospital Medicine  Parth Adams DO  Pager (M-F, 8A-5P):  MS Teams PREFERRED        SUBJECTIVE / OVERNIGHT EVENTS:  Pt seen at bedside during morning rounds. Pt is more alert today.   Has confusion to where she is  Denies any acute distress  Per nurse tried climbing out of bed      MEDICATIONS  (STANDING):  AQUAPHOR (petrolatum Ointment) 1 Application(s) Topical daily  aspirin enteric coated 81 milliGRAM(s) Oral daily  cetirizine 10 milliGRAM(s) Oral daily  cyanocobalamin 1000 MICROGram(s) Oral daily  dextrose 5% + sodium chloride 0.45%. 1000 milliLiter(s) (50 mL/Hr) IV Continuous <Continuous>  donepezil 5 milliGRAM(s) Oral at bedtime  folic acid 1 milliGRAM(s) Oral daily  hydrALAZINE 10 milliGRAM(s) Oral two times a day  influenza  Vaccine (HIGH DOSE) 0.5 milliLiter(s) IntraMuscular once  memantine 5 milliGRAM(s) Oral daily  pyridoxine 100 milliGRAM(s) Oral daily  rosuvastatin 5 milliGRAM(s) Oral at bedtime    MEDICATIONS  (PRN):  acetaminophen     Tablet .. 650 milliGRAM(s) Oral every 6 hours PRN Temp greater or equal to 38C (100.4F), Mild Pain (1 - 3)  aluminum hydroxide/magnesium hydroxide/simethicone Suspension 30 milliLiter(s) Oral every 4 hours PRN Dyspepsia  melatonin 3 milliGRAM(s) Oral at bedtime PRN Insomnia  ondansetron Injectable 4 milliGRAM(s) IV Push every 8 hours PRN Nausea and/or Vomiting      I&O's Summary    11 Oct 2024 07:01  -  12 Oct 2024 07:00  --------------------------------------------------------  IN: 400 mL / OUT: 0 mL / NET: 400 mL    12 Oct 2024 07:01  -  12 Oct 2024 14:43  --------------------------------------------------------  IN: 236 mL / OUT: 0 mL / NET: 236 mL        PHYSICAL EXAM:  Vital Signs Last 24 Hrs  T(C): 36.6 (12 Oct 2024 11:27), Max: 36.6 (12 Oct 2024 04:40)  T(F): 97.9 (12 Oct 2024 11:27), Max: 97.9 (12 Oct 2024 04:40)  HR: 66 (12 Oct 2024 11:27) (62 - 71)  BP: 132/63 (12 Oct 2024 11:27) (132/63 - 153/80)  BP(mean): --  RR: 18 (12 Oct 2024 11:27) (18 - 18)  SpO2: 93% (12 Oct 2024 11:27) (93% - 99%)    Parameters below as of 12 Oct 2024 11:27  Patient On (Oxygen Delivery Method): room air      CONSTITUTIONAL: NAD, well-developed, well-groomed  RESPIRATORY: Normal respiratory effort; lungs are clear to auscultation bilaterally  CARDIOVASCULAR: Regular rate and rhythm, normal S1 and S2, no murmur/rub/gallop; No lower extremity edema; Peripheral pulses are 2+ bilaterally  ABDOMEN: Nontender to palpation, normoactive bowel sounds, no rebound/guarding; No hepatosplenomegaly  MUSCULOSKELETAL:  Normal gait; no clubbing or cyanosis of digits; no joint swelling or tenderness to palpation  PSYCH: A+O to person,  NEUROLOGY:  no gross sensory deficits   SKIN: No rashes; no palpable lesions    LABS:                        11.9   8.84  )-----------( 360      ( 12 Oct 2024 08:31 )             38.6     10-12    144  |  106  |  24[H]  ----------------------------<  75  4.0   |  25  |  1.03    Ca    9.2      12 Oct 2024 08:33            Urinalysis Basic - ( 12 Oct 2024 08:33 )    Color: x / Appearance: x / SG: x / pH: x  Gluc: 75 mg/dL / Ketone: x  / Bili: x / Urobili: x   Blood: x / Protein: x / Nitrite: x   Leuk Esterase: x / RBC: x / WBC x   Sq Epi: x / Non Sq Epi: x / Bacteria: x        Culture - CSF with Gram Stain (collected 10 Oct 2024 12:07)  Source: .CSF CSF  Gram Stain (10 Oct 2024 18:38):    No polymorphonuclear cells seen    No organisms seen    by cytocentrifuge  Preliminary Report (11 Oct 2024 16:09):    No growth        RADIOLOGY & ADDITIONAL TESTS:  Results Reviewed: CBC/CMP personally reviewed by me Hgb 11.9 Cr 1.03  Imaging Personally Reviewed:  Electrocardiogram Personally Reviewed:    COORDINATION OF CARE:  Care Discussed with Consultants/Other Providers [Y/N]:  Prior or Outpatient Records Reviewed [Y/N]:

## 2024-10-15 LAB
CULTURE RESULTS: SIGNIFICANT CHANGE UP
SPECIMEN SOURCE: SIGNIFICANT CHANGE UP
WNV IGG CSF IA-ACNC: NEGATIVE — SIGNIFICANT CHANGE UP
WNV IGM CSF IA-ACNC: NEGATIVE — SIGNIFICANT CHANGE UP

## 2024-10-21 LAB
AMPHIPHYSIN AB TITR CSF: NEGATIVE — SIGNIFICANT CHANGE UP
CV2 IGG TITR CSF: NEGATIVE — SIGNIFICANT CHANGE UP
GLIAL NUC TYPE 1 AB TITR CSF: NEGATIVE — SIGNIFICANT CHANGE UP
HU1 AB TITR CSF IF: NEGATIVE — SIGNIFICANT CHANGE UP
HU2 AB TITR CSF IF: NEGATIVE — SIGNIFICANT CHANGE UP
HU3 AB TITR CSF: NEGATIVE — SIGNIFICANT CHANGE UP
IFA NOTES: SIGNIFICANT CHANGE UP
PARANEOPLASTIC INTERPRETATION, CSF: SIGNIFICANT CHANGE UP
PCA-TR AB TITR CSF: NEGATIVE — SIGNIFICANT CHANGE UP
PURKINJE CELL CYTOPLASMIC AB TYPE 2: NEGATIVE — SIGNIFICANT CHANGE UP
PURKINJE CELLS AB TITR CSF IF: NEGATIVE — SIGNIFICANT CHANGE UP

## 2024-10-29 LAB — 14-3-3 AG CSF-MCNC: SIGNIFICANT CHANGE UP

## 2024-12-14 ENCOUNTER — EMERGENCY (EMERGENCY)
Facility: HOSPITAL | Age: 84
LOS: 1 days | Discharge: HOME | End: 2024-12-14
Attending: EMERGENCY MEDICINE | Admitting: EMERGENCY MEDICINE
Payer: MEDICARE

## 2024-12-14 VITALS
RESPIRATION RATE: 16 BRPM | OXYGEN SATURATION: 97 % | SYSTOLIC BLOOD PRESSURE: 128 MMHG | TEMPERATURE: 99 F | HEIGHT: 64 IN | WEIGHT: 169.98 LBS | HEART RATE: 66 BPM | DIASTOLIC BLOOD PRESSURE: 78 MMHG

## 2024-12-14 VITALS
HEART RATE: 60 BPM | DIASTOLIC BLOOD PRESSURE: 60 MMHG | SYSTOLIC BLOOD PRESSURE: 174 MMHG | RESPIRATION RATE: 16 BRPM | OXYGEN SATURATION: 97 %

## 2024-12-14 PROBLEM — I10 ESSENTIAL (PRIMARY) HYPERTENSION: Chronic | Status: ACTIVE | Noted: 2024-09-30

## 2024-12-14 PROBLEM — I10 ESSENTIAL (PRIMARY) HYPERTENSION: Chronic | Status: ACTIVE | Noted: 2024-09-26

## 2024-12-14 PROBLEM — F03.90 UNSPECIFIED DEMENTIA, UNSPECIFIED SEVERITY, WITHOUT BEHAVIORAL DISTURBANCE, PSYCHOTIC DISTURBANCE, MOOD DISTURBANCE, AND ANXIETY: Chronic | Status: ACTIVE | Noted: 2024-09-30

## 2024-12-14 PROCEDURE — 72170 X-RAY EXAM OF PELVIS: CPT | Mod: 26

## 2024-12-14 PROCEDURE — 72125 CT NECK SPINE W/O DYE: CPT | Mod: 26,MC

## 2024-12-14 PROCEDURE — 73060 X-RAY EXAM OF HUMERUS: CPT | Mod: 26,LT

## 2024-12-14 PROCEDURE — 70450 CT HEAD/BRAIN W/O DYE: CPT | Mod: MC

## 2024-12-14 PROCEDURE — 70450 CT HEAD/BRAIN W/O DYE: CPT | Mod: 26,MC

## 2024-12-14 PROCEDURE — 73030 X-RAY EXAM OF SHOULDER: CPT | Mod: 26,LT

## 2024-12-14 PROCEDURE — 73060 X-RAY EXAM OF HUMERUS: CPT

## 2024-12-14 PROCEDURE — 71045 X-RAY EXAM CHEST 1 VIEW: CPT | Mod: 26

## 2024-12-14 PROCEDURE — 71045 X-RAY EXAM CHEST 1 VIEW: CPT

## 2024-12-14 PROCEDURE — 99285 EMERGENCY DEPT VISIT HI MDM: CPT

## 2024-12-14 PROCEDURE — 73030 X-RAY EXAM OF SHOULDER: CPT

## 2024-12-14 PROCEDURE — 99284 EMERGENCY DEPT VISIT MOD MDM: CPT | Mod: 25

## 2024-12-14 PROCEDURE — 72125 CT NECK SPINE W/O DYE: CPT | Mod: MC

## 2024-12-14 PROCEDURE — 72170 X-RAY EXAM OF PELVIS: CPT

## 2024-12-14 RX ORDER — BACITRACIN ZINC 500 UNIT/G
1 OINTMENT (GRAM) TOPICAL ONCE
Refills: 0 | Status: COMPLETED | OUTPATIENT
Start: 2024-12-14 | End: 2024-12-14

## 2024-12-14 RX ADMIN — Medication 1 APPLICATION(S): at 08:10

## 2024-12-14 NOTE — ED PROVIDER NOTE - PHYSICAL EXAMINATION
Vitals: I have reviewed the patients vital signs  General: nontoxic appearing, confused. alert and oriented x 1  HEENT: normocephalic, airway patent, abrasion to right cheek and right lower lip. No active bleeding.   Eyes: EOMI, tracking appropriately, PERRL  Neck: no tracheal deviation  Chest/Lungs: no trauma, symmetric chest rise,  no resp distress  Heart: skin and extremities well perfused, regular rate and rhythm  Abd: Soft NT ND  Neuro: A+Ox1, appears non focal  MSK: strength at baseline in all extremities, no muscle wasting or atrophy  Skin: no cyanosis, no jaundice, abrasion to right face/cheek and right lower lip. No active bleeding. Skin tear 2cm to right forearm. Old abrasions to b/l legs.

## 2024-12-14 NOTE — ED PROVIDER NOTE - CARE PLAN
Principal Discharge DX:	CHI (closed head injury), initial encounter  Secondary Diagnosis:	Skin tear of right forearm without complication  Secondary Diagnosis:	Abrasion of skin   1

## 2024-12-14 NOTE — ED PROVIDER NOTE - PATIENT PORTAL LINK FT
You can access the FollowMyHealth Patient Portal offered by [facility] by registering at the following website: [email or web address]. By joining [employer]s FollowMyHealth portal, you will also be able to view your health information using other applications (apps) compatible with our system.

## 2024-12-14 NOTE — ED PROVIDER NOTE - CLINICAL SUMMARY MEDICAL DECISION MAKING FREE TEXT BOX
83-year-old female with history of dementia alert and oriented x 1 at baseline presents to the emergency department after an unwitnessed fall at facility.  Patient with signs of head injury and that there is abrasions to the right cheek and face.  Blood noted around the right side of the mouth.  Skin tear to the right forearm.    Exam as stated. Plan for imaging: CT brain/ C Spine/ Xray shoulder and humerus, Chest and Pelvis. 83-year-old female with history of dementia alert and oriented x 1 at baseline presents to the emergency department after an unwitnessed fall at facility.  Patient with signs of head injury and that there is abrasions to the right cheek and face.  Blood noted around the right side of the mouth.  Skin tear to the right forearm.    Exam as stated. Plan for imaging: CT brain/ C Spine/ Xray shoulder and humerus, Chest and Pelvis.  Results reviewed.  Patient stable for discharge.  C-collar cleared

## 2024-12-14 NOTE — ED PROVIDER NOTE - OBJECTIVE STATEMENT
83-year-old female with history of dementia alert and oriented x 1 at baseline presents to the emergency department after an unwitnessed fall at facility.  Patient with signs of head injury and that there is abrasions to the right cheek and face.  Blood noted around the right side of the mouth.  Skin tear to the right forearm.  Circumstances of fall unknown.

## 2024-12-14 NOTE — ED ADULT NURSE NOTE - OBJECTIVE STATEMENT
Patient BIBA from [facility] s/p unwitnessed fall, unknown LOC. Patient bleeding in mouth, right arm pain, right leg pain. Patient placed in c collar by EMS. Patient axox1 with history of dementia. abrasions present to face, skin evulsion on rt forearm. wounds cleaned and dress per provider orders. safety maintained,

## 2024-12-14 NOTE — ED ADULT TRIAGE NOTE - CHIEF COMPLAINT QUOTE
Patient BIBA from [facility] s/p unwitnessed fall, unknown LOC. Patient bleeding in mouth, right arm pain, right leg pain. Patient placed in c collar by EMS. Patient axox1 with history of dementia

## 2024-12-14 NOTE — ED PROVIDER NOTE - NSFOLLOWUPINSTRUCTIONS_ED_ALL_ED_FT
Fall Prevention    AMBULATORY CARE:    Fall prevention includes ways to make your home and other areas safer. Prevention also includes ways you can move more carefully to prevent a fall. Health conditions that cause changes in your blood pressure, vision, or muscle strength and coordination may increase your risk for falls. Medicines may also increase your risk for falls if they make you dizzy, weak, or sleepy.    Arrange to have someone call your local emergency number (911 in the [place]) if:    You have fallen and are found unconscious.    You have fallen and cannot move part of your body.  Call your doctor if:    You have fallen and have pain or a headache.    You have questions or concerns about your condition or care.  Fall prevention tips:    Stand or sit up slowly. This may help you keep your balance and prevent falls.    Use assistive devices as directed. Your healthcare provider may suggest that you use a cane or walker to help you keep your balance. You may need to have grab bars put in your bathroom near the toilet or in the shower.    Wear shoes that fit well and have soles that . Wear shoes both inside and outside. Use slippers with good . Do not wear shoes with high heels.    Stay active. Exercise can help strengthen your muscles and improve your balance. Your healthcare provider may recommend water aerobics or walking. He or she may also recommend physical therapy to improve your coordination. Never start an exercise program without talking to your healthcare provider first.  [employer] Walking for Exercise      Manage your medical conditions. Keep all appointments with your healthcare providers. Visit your eye doctor as directed.  Home safety tips:  Fall Prevention for Adults    Wear a personal alarm. This is a device that allows you to call for help if you fall. Ask your healthcare provider for more information.    Add items to prevent falls in the bathroom. Put nonslip strips on your bath or shower floor to prevent you from slipping. Use a bath mat if you do not have carpet in the bathroom. This will prevent you from falling when you step out of the bath or shower. Use a shower seat so you do not need to stand while you shower. Sit on the toilet or a chair in your bathroom to dry yourself and put on clothing. This will prevent you from losing your balance from drying or dressing yourself while you are standing.    Keep paths clear. Remove books, shoes, and other objects from walkways and stairs. Place cords for telephones and lamps out of the way so that you do not need to walk over them. Tape them down if you cannot move them. Remove small rugs. If you cannot remove a rug, secure it with double-sided tape. This will prevent you from tripping.    Install bright lights in your home. Use night lights to help light paths to the bathroom or kitchen. Always turn on the light before you start walking.    Keep items you use often on shelves within reach. Do not use a step stool to help you reach an item.    Paint or place reflective tape on the edges of your stairs. This will help you see the stairs better.  Plan ahead in case you do fall: Talk with family members, friends, and neighbors to create a fall plan. Someone will need to call for emergency help if you are injured or found unconscious. If possible, keep a mobile phone with you at all times, or wear an emergency alert device. You can contact emergency services by pressing a button on the device. Ask your healthcare provider for more information.    Follow up with your doctor as directed: Write down your questions so you remember to ask them during your visits. Fall Prevention    The CAT scans do not show any acute traumatic findings. Arthritic findings on CAT scan for the neck. The x-rays are preliminarily negative.  If any new findings after radiology report is placed, we will notify facility.  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  Please follow-up with your primary medical doctor.    AMBULATORY CARE:    Fall prevention includes ways to make your home and other areas safer. Prevention also includes ways you can move more carefully to prevent a fall. Health conditions that cause changes in your blood pressure, vision, or muscle strength and coordination may increase your risk for falls. Medicines may also increase your risk for falls if they make you dizzy, weak, or sleepy.    Arrange to have someone call your local emergency number (911 in the [place]) if:    You have fallen and are found unconscious.    You have fallen and cannot move part of your body.  Call your doctor if:    You have fallen and have pain or a headache.    You have questions or concerns about your condition or care.  Fall prevention tips:    Stand or sit up slowly. This may help you keep your balance and prevent falls.    Use assistive devices as directed. Your healthcare provider may suggest that you use a cane or walker to help you keep your balance. You may need to have grab bars put in your bathroom near the toilet or in the shower.    Wear shoes that fit well and have soles that . Wear shoes both inside and outside. Use slippers with good . Do not wear shoes with high heels.    Stay active. Exercise can help strengthen your muscles and improve your balance. Your healthcare provider may recommend water aerobics or walking. He or she may also recommend physical therapy to improve your coordination. Never start an exercise program without talking to your healthcare provider first.  [employer] Walking for Exercise      Manage your medical conditions. Keep all appointments with your healthcare providers. Visit your eye doctor as directed.  Home safety tips:  Fall Prevention for Adults    Wear a personal alarm. This is a device that allows you to call for help if you fall. Ask your healthcare provider for more information.    Add items to prevent falls in the bathroom. Put nonslip strips on your bath or shower floor to prevent you from slipping. Use a bath mat if you do not have carpet in the bathroom. This will prevent you from falling when you step out of the bath or shower. Use a shower seat so you do not need to stand while you shower. Sit on the toilet or a chair in your bathroom to dry yourself and put on clothing. This will prevent you from losing your balance from drying or dressing yourself while you are standing.    Keep paths clear. Remove books, shoes, and other objects from walkways and stairs. Place cords for telephones and lamps out of the way so that you do not need to walk over them. Tape them down if you cannot move them. Remove small rugs. If you cannot remove a rug, secure it with double-sided tape. This will prevent you from tripping.    Install bright lights in your home. Use night lights to help light paths to the bathroom or kitchen. Always turn on the light before you start walking.    Keep items you use often on shelves within reach. Do not use a step stool to help you reach an item.    Paint or place reflective tape on the edges of your stairs. This will help you see the stairs better.  Plan ahead in case you do fall: Talk with family members, friends, and neighbors to create a fall plan. Someone will need to call for emergency help if you are injured or found unconscious. If possible, keep a mobile phone with you at all times, or wear an emergency alert device. You can contact emergency services by pressing a button on the device. Ask your healthcare provider for more information.    Follow up with your doctor as directed: Write down your questions so you remember to ask them during your visits.

## 2024-12-20 NOTE — ED ADULT NURSE NOTE - OBJECTIVE STATEMENT
83y female, Baseline AAOx2-3, presents with 1 week of AMS, seen at  and [facility] with unremarkable workup, recent fall a few days ago, normally wheelkchair bound but able to stand with assist, denies chest pain, shortness of breath, abdominal pain, n/v/d, placed in gown, side rails up for safety, bed in lowest position, call bell within reach, patient and family educated on plan of care, comfort and safety provided.
never used

## 2024-12-28 ENCOUNTER — TRANSCRIPTION ENCOUNTER (OUTPATIENT)
Age: 84
End: 2024-12-28

## 2025-01-01 ENCOUNTER — INPATIENT (INPATIENT)
Facility: HOSPITAL | Age: 85
LOS: 6 days | DRG: 66 | End: 2025-01-25
Attending: HOSPITALIST | Admitting: INTERNAL MEDICINE
Payer: MEDICARE

## 2025-01-01 VITALS
DIASTOLIC BLOOD PRESSURE: 87 MMHG | OXYGEN SATURATION: 91 % | SYSTOLIC BLOOD PRESSURE: 158 MMHG | HEART RATE: 74 BPM | RESPIRATION RATE: 20 BRPM | TEMPERATURE: 99 F

## 2025-01-01 VITALS
TEMPERATURE: 98 F | SYSTOLIC BLOOD PRESSURE: 151 MMHG | DIASTOLIC BLOOD PRESSURE: 61 MMHG | HEART RATE: 70 BPM | HEIGHT: 64 IN | RESPIRATION RATE: 16 BRPM | OXYGEN SATURATION: 97 %

## 2025-01-01 DIAGNOSIS — R09.89 OTHER SPECIFIED SYMPTOMS AND SIGNS INVOLVING THE CIRCULATORY AND RESPIRATORY SYSTEMS: ICD-10-CM

## 2025-01-01 DIAGNOSIS — R53.2 FUNCTIONAL QUADRIPLEGIA: ICD-10-CM

## 2025-01-01 DIAGNOSIS — R45.1 RESTLESSNESS AND AGITATION: ICD-10-CM

## 2025-01-01 DIAGNOSIS — I61.9 NONTRAUMATIC INTRACEREBRAL HEMORRHAGE, UNSPECIFIED: ICD-10-CM

## 2025-01-01 DIAGNOSIS — Z51.5 ENCOUNTER FOR PALLIATIVE CARE: ICD-10-CM

## 2025-01-01 DIAGNOSIS — Z71.89 OTHER SPECIFIED COUNSELING: ICD-10-CM

## 2025-01-01 DIAGNOSIS — R52 PAIN, UNSPECIFIED: ICD-10-CM

## 2025-01-01 DIAGNOSIS — R06.00 DYSPNEA, UNSPECIFIED: ICD-10-CM

## 2025-01-01 DIAGNOSIS — R50.9 FEVER, UNSPECIFIED: ICD-10-CM

## 2025-01-01 DIAGNOSIS — G93.41 METABOLIC ENCEPHALOPATHY: ICD-10-CM

## 2025-01-01 LAB
-  AMOXICILLIN/CLAVULANIC ACID: SIGNIFICANT CHANGE UP
-  AMPICILLIN/SULBACTAM: SIGNIFICANT CHANGE UP
-  AMPICILLIN: SIGNIFICANT CHANGE UP
-  AZTREONAM: SIGNIFICANT CHANGE UP
-  CEFAZOLIN: SIGNIFICANT CHANGE UP
-  CEFEPIME: SIGNIFICANT CHANGE UP
-  CEFOXITIN: SIGNIFICANT CHANGE UP
-  CEFTRIAXONE: SIGNIFICANT CHANGE UP
-  CEFUROXIME: SIGNIFICANT CHANGE UP
-  CIPROFLOXACIN: SIGNIFICANT CHANGE UP
-  ERTAPENEM: SIGNIFICANT CHANGE UP
-  GENTAMICIN: SIGNIFICANT CHANGE UP
-  IMIPENEM: SIGNIFICANT CHANGE UP
-  LEVOFLOXACIN: SIGNIFICANT CHANGE UP
-  MEROPENEM: SIGNIFICANT CHANGE UP
-  NITROFURANTOIN: SIGNIFICANT CHANGE UP
-  PIPERACILLIN/TAZOBACTAM: SIGNIFICANT CHANGE UP
-  TOBRAMYCIN: SIGNIFICANT CHANGE UP
-  TRIMETHOPRIM/SULFAMETHOXAZOLE: SIGNIFICANT CHANGE UP
A1C WITH ESTIMATED AVERAGE GLUCOSE RESULT: 5.4 % — SIGNIFICANT CHANGE UP (ref 4–5.6)
ADD ON TEST-SPECIMEN IN LAB: SIGNIFICANT CHANGE UP
ALBUMIN SERPL ELPH-MCNC: 3.6 G/DL — SIGNIFICANT CHANGE UP (ref 3.3–5)
ALP SERPL-CCNC: 61 U/L — SIGNIFICANT CHANGE UP (ref 40–120)
ALT FLD-CCNC: 11 U/L — SIGNIFICANT CHANGE UP (ref 10–45)
AMPHET UR-MCNC: NEGATIVE — SIGNIFICANT CHANGE UP
ANION GAP SERPL CALC-SCNC: 12 MMOL/L — SIGNIFICANT CHANGE UP (ref 5–17)
ANION GAP SERPL CALC-SCNC: 13 MMOL/L — SIGNIFICANT CHANGE UP (ref 5–17)
ANION GAP SERPL CALC-SCNC: 14 MMOL/L — SIGNIFICANT CHANGE UP (ref 5–17)
ANION GAP SERPL CALC-SCNC: 15 MMOL/L — SIGNIFICANT CHANGE UP (ref 5–17)
APPEARANCE UR: CLEAR — SIGNIFICANT CHANGE UP
APTT BLD: 33.1 SEC — SIGNIFICANT CHANGE UP (ref 24.5–35.6)
AST SERPL-CCNC: 23 U/L — SIGNIFICANT CHANGE UP (ref 10–40)
BACTERIA # UR AUTO: ABNORMAL /HPF
BARBITURATES UR SCN-MCNC: NEGATIVE — SIGNIFICANT CHANGE UP
BASOPHILS # BLD AUTO: 0.05 K/UL — SIGNIFICANT CHANGE UP (ref 0–0.2)
BASOPHILS NFR BLD AUTO: 0.6 % — SIGNIFICANT CHANGE UP (ref 0–2)
BENZODIAZ UR-MCNC: POSITIVE
BILIRUB SERPL-MCNC: 0.3 MG/DL — SIGNIFICANT CHANGE UP (ref 0.2–1.2)
BILIRUB UR-MCNC: NEGATIVE — SIGNIFICANT CHANGE UP
BUN SERPL-MCNC: 27 MG/DL — HIGH (ref 7–23)
BUN SERPL-MCNC: 29 MG/DL — HIGH (ref 7–23)
BUN SERPL-MCNC: 30 MG/DL — HIGH (ref 7–23)
BUN SERPL-MCNC: 39 MG/DL — HIGH (ref 7–23)
CALCIUM SERPL-MCNC: 8.7 MG/DL — SIGNIFICANT CHANGE UP (ref 8.4–10.5)
CALCIUM SERPL-MCNC: 8.8 MG/DL — SIGNIFICANT CHANGE UP (ref 8.4–10.5)
CALCIUM SERPL-MCNC: 8.8 MG/DL — SIGNIFICANT CHANGE UP (ref 8.4–10.5)
CALCIUM SERPL-MCNC: 9.4 MG/DL — SIGNIFICANT CHANGE UP (ref 8.4–10.5)
CAST: 1 /LPF — SIGNIFICANT CHANGE UP (ref 0–4)
CHLORIDE SERPL-SCNC: 107 MMOL/L — SIGNIFICANT CHANGE UP (ref 96–108)
CHLORIDE SERPL-SCNC: 107 MMOL/L — SIGNIFICANT CHANGE UP (ref 96–108)
CHLORIDE SERPL-SCNC: 108 MMOL/L — SIGNIFICANT CHANGE UP (ref 96–108)
CHLORIDE SERPL-SCNC: 109 MMOL/L — HIGH (ref 96–108)
CHOLEST SERPL-MCNC: 113 MG/DL — SIGNIFICANT CHANGE UP
CO2 SERPL-SCNC: 22 MMOL/L — SIGNIFICANT CHANGE UP (ref 22–31)
CO2 SERPL-SCNC: 23 MMOL/L — SIGNIFICANT CHANGE UP (ref 22–31)
COCAINE METAB.OTHER UR-MCNC: NEGATIVE — SIGNIFICANT CHANGE UP
COLOR SPEC: YELLOW — SIGNIFICANT CHANGE UP
CREAT SERPL-MCNC: 0.96 MG/DL — SIGNIFICANT CHANGE UP (ref 0.5–1.3)
CREAT SERPL-MCNC: 1 MG/DL — SIGNIFICANT CHANGE UP (ref 0.5–1.3)
CREAT SERPL-MCNC: 1 MG/DL — SIGNIFICANT CHANGE UP (ref 0.5–1.3)
CREAT SERPL-MCNC: 1.17 MG/DL — SIGNIFICANT CHANGE UP (ref 0.5–1.3)
CULTURE RESULTS: ABNORMAL
CULTURE RESULTS: SIGNIFICANT CHANGE UP
CULTURE RESULTS: SIGNIFICANT CHANGE UP
DIFF PNL FLD: NEGATIVE — SIGNIFICANT CHANGE UP
EGFR: 46 ML/MIN/1.73M2 — LOW
EGFR: 55 ML/MIN/1.73M2 — LOW
EGFR: 55 ML/MIN/1.73M2 — LOW
EGFR: 58 ML/MIN/1.73M2 — LOW
EOSINOPHIL # BLD AUTO: 0.28 K/UL — SIGNIFICANT CHANGE UP (ref 0–0.5)
EOSINOPHIL NFR BLD AUTO: 3.4 % — SIGNIFICANT CHANGE UP (ref 0–6)
ESTIMATED AVERAGE GLUCOSE: 108 MG/DL — SIGNIFICANT CHANGE UP (ref 68–114)
GAS PNL BLDV: SIGNIFICANT CHANGE UP
GLUCOSE SERPL-MCNC: 82 MG/DL — SIGNIFICANT CHANGE UP (ref 70–99)
GLUCOSE SERPL-MCNC: 82 MG/DL — SIGNIFICANT CHANGE UP (ref 70–99)
GLUCOSE SERPL-MCNC: 86 MG/DL — SIGNIFICANT CHANGE UP (ref 70–99)
GLUCOSE SERPL-MCNC: 88 MG/DL — SIGNIFICANT CHANGE UP (ref 70–99)
GLUCOSE UR QL: NEGATIVE MG/DL — SIGNIFICANT CHANGE UP
HCT VFR BLD CALC: 32.1 % — LOW (ref 34.5–45)
HDLC SERPL-MCNC: 57 MG/DL — SIGNIFICANT CHANGE UP
HGB BLD-MCNC: 9.9 G/DL — LOW (ref 11.5–15.5)
IMM GRANULOCYTES NFR BLD AUTO: 0.5 % — SIGNIFICANT CHANGE UP (ref 0–0.9)
INR BLD: 1.18 RATIO — HIGH (ref 0.85–1.16)
KETONES UR-MCNC: NEGATIVE MG/DL — SIGNIFICANT CHANGE UP
LEUKOCYTE ESTERASE UR-ACNC: NEGATIVE — SIGNIFICANT CHANGE UP
LIPID PNL WITH DIRECT LDL SERPL: 45 MG/DL — SIGNIFICANT CHANGE UP
LYMPHOCYTES # BLD AUTO: 2.03 K/UL — SIGNIFICANT CHANGE UP (ref 1–3.3)
LYMPHOCYTES # BLD AUTO: 24.3 % — SIGNIFICANT CHANGE UP (ref 13–44)
MAGNESIUM SERPL-MCNC: 1.8 MG/DL — SIGNIFICANT CHANGE UP (ref 1.6–2.6)
MAGNESIUM SERPL-MCNC: 2 MG/DL — SIGNIFICANT CHANGE UP (ref 1.6–2.6)
MAGNESIUM SERPL-MCNC: 2.3 MG/DL — SIGNIFICANT CHANGE UP (ref 1.6–2.6)
MCHC RBC-ENTMCNC: 27.6 PG — SIGNIFICANT CHANGE UP (ref 27–34)
MCHC RBC-ENTMCNC: 30.8 G/DL — LOW (ref 32–36)
MCV RBC AUTO: 89.4 FL — SIGNIFICANT CHANGE UP (ref 80–100)
METHADONE UR-MCNC: NEGATIVE — SIGNIFICANT CHANGE UP
METHOD TYPE: SIGNIFICANT CHANGE UP
MONOCYTES # BLD AUTO: 1.06 K/UL — HIGH (ref 0–0.9)
MONOCYTES NFR BLD AUTO: 12.7 % — SIGNIFICANT CHANGE UP (ref 2–14)
MRSA PCR RESULT.: DETECTED
NEUTROPHILS # BLD AUTO: 4.88 K/UL — SIGNIFICANT CHANGE UP (ref 1.8–7.4)
NEUTROPHILS NFR BLD AUTO: 58.5 % — SIGNIFICANT CHANGE UP (ref 43–77)
NITRITE UR-MCNC: POSITIVE
NON HDL CHOLESTEROL: 55 MG/DL — SIGNIFICANT CHANGE UP
NRBC # BLD: 0 /100 WBCS — SIGNIFICANT CHANGE UP (ref 0–0)
NRBC BLD-RTO: 0 /100 WBCS — SIGNIFICANT CHANGE UP (ref 0–0)
NT-PROBNP SERPL-SCNC: 1066 PG/ML — HIGH (ref 0–300)
OPIATES UR-MCNC: NEGATIVE — SIGNIFICANT CHANGE UP
ORGANISM # SPEC MICROSCOPIC CNT: ABNORMAL
ORGANISM # SPEC MICROSCOPIC CNT: ABNORMAL
OXYCODONE UR-MCNC: NEGATIVE — SIGNIFICANT CHANGE UP
PCP SPEC-MCNC: SIGNIFICANT CHANGE UP
PCP UR-MCNC: NEGATIVE — SIGNIFICANT CHANGE UP
PH UR: 5.5 — SIGNIFICANT CHANGE UP (ref 5–8)
PHOSPHATE SERPL-MCNC: 3.9 MG/DL — SIGNIFICANT CHANGE UP (ref 2.5–4.5)
PHOSPHATE SERPL-MCNC: 4 MG/DL — SIGNIFICANT CHANGE UP (ref 2.5–4.5)
PHOSPHATE SERPL-MCNC: 4 MG/DL — SIGNIFICANT CHANGE UP (ref 2.5–4.5)
PLATELET # BLD AUTO: 291 K/UL — SIGNIFICANT CHANGE UP (ref 150–400)
PLATELET RESPONSE ASPIRIN RESULT: 457 ARU — SIGNIFICANT CHANGE UP
POTASSIUM SERPL-MCNC: 3.9 MMOL/L — SIGNIFICANT CHANGE UP (ref 3.5–5.3)
POTASSIUM SERPL-MCNC: 4 MMOL/L — SIGNIFICANT CHANGE UP (ref 3.5–5.3)
POTASSIUM SERPL-MCNC: 4.4 MMOL/L — SIGNIFICANT CHANGE UP (ref 3.5–5.3)
POTASSIUM SERPL-MCNC: 4.8 MMOL/L — SIGNIFICANT CHANGE UP (ref 3.5–5.3)
POTASSIUM SERPL-SCNC: 3.9 MMOL/L — SIGNIFICANT CHANGE UP (ref 3.5–5.3)
POTASSIUM SERPL-SCNC: 4 MMOL/L — SIGNIFICANT CHANGE UP (ref 3.5–5.3)
POTASSIUM SERPL-SCNC: 4.4 MMOL/L — SIGNIFICANT CHANGE UP (ref 3.5–5.3)
POTASSIUM SERPL-SCNC: 4.8 MMOL/L — SIGNIFICANT CHANGE UP (ref 3.5–5.3)
PROT SERPL-MCNC: 7 G/DL — SIGNIFICANT CHANGE UP (ref 6–8.3)
PROT UR-MCNC: NEGATIVE MG/DL — SIGNIFICANT CHANGE UP
PROTHROM AB SERPL-ACNC: 13.4 SEC — SIGNIFICANT CHANGE UP (ref 9.9–13.4)
RBC # BLD: 3.59 M/UL — LOW (ref 3.8–5.2)
RBC # FLD: 15.9 % — HIGH (ref 10.3–14.5)
RBC CASTS # UR COMP ASSIST: 4 /HPF — SIGNIFICANT CHANGE UP (ref 0–4)
S AUREUS DNA NOSE QL NAA+PROBE: DETECTED
SODIUM SERPL-SCNC: 143 MMOL/L — SIGNIFICANT CHANGE UP (ref 135–145)
SODIUM SERPL-SCNC: 143 MMOL/L — SIGNIFICANT CHANGE UP (ref 135–145)
SODIUM SERPL-SCNC: 144 MMOL/L — SIGNIFICANT CHANGE UP (ref 135–145)
SODIUM SERPL-SCNC: 144 MMOL/L — SIGNIFICANT CHANGE UP (ref 135–145)
SP GR SPEC: 1.01 — SIGNIFICANT CHANGE UP (ref 1–1.03)
SPECIMEN SOURCE: SIGNIFICANT CHANGE UP
SQUAMOUS # UR AUTO: 1 /HPF — SIGNIFICANT CHANGE UP (ref 0–5)
T3 SERPL-MCNC: 89 NG/DL — SIGNIFICANT CHANGE UP (ref 80–200)
T4 AB SER-ACNC: 5.8 UG/DL — SIGNIFICANT CHANGE UP (ref 4.6–12)
T4 FREE SERPL-MCNC: 1.2 NG/DL — SIGNIFICANT CHANGE UP (ref 0.9–1.7)
THC UR QL: NEGATIVE — SIGNIFICANT CHANGE UP
TRIGL SERPL-MCNC: 40 MG/DL — SIGNIFICANT CHANGE UP
TROPONIN T, HIGH SENSITIVITY RESULT: 37 NG/L — SIGNIFICANT CHANGE UP (ref 0–51)
TSH SERPL-MCNC: 2.11 UIU/ML — SIGNIFICANT CHANGE UP (ref 0.27–4.2)
UROBILINOGEN FLD QL: 0.2 MG/DL — SIGNIFICANT CHANGE UP (ref 0.2–1)
WBC # BLD: 8.34 K/UL — SIGNIFICANT CHANGE UP (ref 3.8–10.5)
WBC # FLD AUTO: 8.34 K/UL — SIGNIFICANT CHANGE UP (ref 3.8–10.5)
WBC UR QL: 1 /HPF — SIGNIFICANT CHANGE UP (ref 0–5)

## 2025-01-01 PROCEDURE — 94640 AIRWAY INHALATION TREATMENT: CPT

## 2025-01-01 PROCEDURE — 84100 ASSAY OF PHOSPHORUS: CPT

## 2025-01-01 PROCEDURE — 81001 URINALYSIS AUTO W/SCOPE: CPT

## 2025-01-01 PROCEDURE — 80053 COMPREHEN METABOLIC PANEL: CPT

## 2025-01-01 PROCEDURE — 85730 THROMBOPLASTIN TIME PARTIAL: CPT

## 2025-01-01 PROCEDURE — 99223 1ST HOSP IP/OBS HIGH 75: CPT

## 2025-01-01 PROCEDURE — 93970 EXTREMITY STUDY: CPT | Mod: 26

## 2025-01-01 PROCEDURE — 82435 ASSAY OF BLOOD CHLORIDE: CPT

## 2025-01-01 PROCEDURE — 70450 CT HEAD/BRAIN W/O DYE: CPT | Mod: 26,77

## 2025-01-01 PROCEDURE — 99285 EMERGENCY DEPT VISIT HI MDM: CPT | Mod: GC

## 2025-01-01 PROCEDURE — 82947 ASSAY GLUCOSE BLOOD QUANT: CPT

## 2025-01-01 PROCEDURE — 83605 ASSAY OF LACTIC ACID: CPT

## 2025-01-01 PROCEDURE — 87186 SC STD MICRODIL/AGAR DIL: CPT

## 2025-01-01 PROCEDURE — 84295 ASSAY OF SERUM SODIUM: CPT

## 2025-01-01 PROCEDURE — 99232 SBSQ HOSP IP/OBS MODERATE 35: CPT

## 2025-01-01 PROCEDURE — 97161 PT EVAL LOW COMPLEX 20 MIN: CPT

## 2025-01-01 PROCEDURE — 72125 CT NECK SPINE W/O DYE: CPT | Mod: MC

## 2025-01-01 PROCEDURE — 85610 PROTHROMBIN TIME: CPT

## 2025-01-01 PROCEDURE — 82330 ASSAY OF CALCIUM: CPT

## 2025-01-01 PROCEDURE — 96375 TX/PRO/DX INJ NEW DRUG ADDON: CPT

## 2025-01-01 PROCEDURE — 84443 ASSAY THYROID STIM HORMONE: CPT

## 2025-01-01 PROCEDURE — 83036 HEMOGLOBIN GLYCOSYLATED A1C: CPT

## 2025-01-01 PROCEDURE — 80307 DRUG TEST PRSMV CHEM ANLYZR: CPT

## 2025-01-01 PROCEDURE — 96374 THER/PROPH/DIAG INJ IV PUSH: CPT

## 2025-01-01 PROCEDURE — 85014 HEMATOCRIT: CPT

## 2025-01-01 PROCEDURE — 99285 EMERGENCY DEPT VISIT HI MDM: CPT

## 2025-01-01 PROCEDURE — 80048 BASIC METABOLIC PNL TOTAL CA: CPT

## 2025-01-01 PROCEDURE — 84480 ASSAY TRIIODOTHYRONINE (T3): CPT

## 2025-01-01 PROCEDURE — 93970 EXTREMITY STUDY: CPT

## 2025-01-01 PROCEDURE — 87077 CULTURE AEROBIC IDENTIFY: CPT

## 2025-01-01 PROCEDURE — 99233 SBSQ HOSP IP/OBS HIGH 50: CPT | Mod: FS,GC

## 2025-01-01 PROCEDURE — 71045 X-RAY EXAM CHEST 1 VIEW: CPT | Mod: 26

## 2025-01-01 PROCEDURE — 71250 CT THORAX DX C-: CPT | Mod: 26

## 2025-01-01 PROCEDURE — 70450 CT HEAD/BRAIN W/O DYE: CPT | Mod: MC

## 2025-01-01 PROCEDURE — 72125 CT NECK SPINE W/O DYE: CPT | Mod: 26

## 2025-01-01 PROCEDURE — 71250 CT THORAX DX C-: CPT | Mod: MC

## 2025-01-01 PROCEDURE — 99232 SBSQ HOSP IP/OBS MODERATE 35: CPT | Mod: FS,GC

## 2025-01-01 PROCEDURE — 85576 BLOOD PLATELET AGGREGATION: CPT

## 2025-01-01 PROCEDURE — 84132 ASSAY OF SERUM POTASSIUM: CPT

## 2025-01-01 PROCEDURE — 74176 CT ABD & PELVIS W/O CONTRAST: CPT | Mod: 26

## 2025-01-01 PROCEDURE — 87640 STAPH A DNA AMP PROBE: CPT

## 2025-01-01 PROCEDURE — 82803 BLOOD GASES ANY COMBINATION: CPT

## 2025-01-01 PROCEDURE — 71045 X-RAY EXAM CHEST 1 VIEW: CPT

## 2025-01-01 PROCEDURE — 70450 CT HEAD/BRAIN W/O DYE: CPT | Mod: 26

## 2025-01-01 PROCEDURE — 87641 MR-STAPH DNA AMP PROBE: CPT

## 2025-01-01 PROCEDURE — 97166 OT EVAL MOD COMPLEX 45 MIN: CPT

## 2025-01-01 PROCEDURE — 84484 ASSAY OF TROPONIN QUANT: CPT

## 2025-01-01 PROCEDURE — 83880 ASSAY OF NATRIURETIC PEPTIDE: CPT

## 2025-01-01 PROCEDURE — 83735 ASSAY OF MAGNESIUM: CPT

## 2025-01-01 PROCEDURE — 74176 CT ABD & PELVIS W/O CONTRAST: CPT | Mod: MC

## 2025-01-01 PROCEDURE — 87086 URINE CULTURE/COLONY COUNT: CPT

## 2025-01-01 PROCEDURE — 84436 ASSAY OF TOTAL THYROXINE: CPT

## 2025-01-01 PROCEDURE — 99291 CRITICAL CARE FIRST HOUR: CPT

## 2025-01-01 PROCEDURE — 84439 ASSAY OF FREE THYROXINE: CPT

## 2025-01-01 PROCEDURE — 85018 HEMOGLOBIN: CPT

## 2025-01-01 PROCEDURE — 82550 ASSAY OF CK (CPK): CPT

## 2025-01-01 PROCEDURE — 82962 GLUCOSE BLOOD TEST: CPT

## 2025-01-01 PROCEDURE — 80061 LIPID PANEL: CPT

## 2025-01-01 PROCEDURE — 36415 COLL VENOUS BLD VENIPUNCTURE: CPT

## 2025-01-01 PROCEDURE — 85025 COMPLETE CBC W/AUTO DIFF WBC: CPT

## 2025-01-01 PROCEDURE — 99497 ADVNCD CARE PLAN 30 MIN: CPT | Mod: 25

## 2025-01-01 PROCEDURE — 87040 BLOOD CULTURE FOR BACTERIA: CPT

## 2025-01-01 RX ORDER — MORPHINE SULFATE 60 MG/1
2 TABLET, FILM COATED, EXTENDED RELEASE ORAL
Refills: 0 | Status: DISCONTINUED | OUTPATIENT
Start: 2025-01-01 | End: 2025-01-01

## 2025-01-01 RX ORDER — KETOROLAC TROMETHAMINE 10 MG
15 TABLET ORAL ONCE
Refills: 0 | Status: DISCONTINUED | OUTPATIENT
Start: 2025-01-01 | End: 2025-01-01

## 2025-01-01 RX ORDER — ANTISEPTIC SURGICAL SCRUB 0.04 MG/ML
1 SOLUTION TOPICAL
Refills: 0 | Status: DISCONTINUED | OUTPATIENT
Start: 2025-01-01 | End: 2025-01-01

## 2025-01-01 RX ORDER — ACETAMINOPHEN 160 MG/5ML
1000 SUSPENSION ORAL ONCE
Refills: 0 | Status: COMPLETED | OUTPATIENT
Start: 2025-01-01 | End: 2025-01-01

## 2025-01-01 RX ORDER — DEXMEDETOMIDINE HYDROCHLORIDE 4 UG/ML
0.3 INJECTION, SOLUTION INTRAVENOUS
Qty: 200 | Refills: 0 | Status: DISCONTINUED | OUTPATIENT
Start: 2025-01-01 | End: 2025-01-01

## 2025-01-01 RX ORDER — PYRIDOXINE HCL (VITAMIN B6) 25 MG
50 TABLET ORAL
Refills: 0 | Status: DISCONTINUED | OUTPATIENT
Start: 2025-01-01 | End: 2025-01-01

## 2025-01-01 RX ORDER — MORPHINE SULFATE 60 MG/1
4 TABLET, FILM COATED, EXTENDED RELEASE ORAL
Refills: 0 | Status: DISCONTINUED | OUTPATIENT
Start: 2025-01-01 | End: 2025-01-01

## 2025-01-01 RX ORDER — PYRIDOXINE HCL (VITAMIN B6) 25 MG
100 TABLET ORAL DAILY
Refills: 0 | Status: DISCONTINUED | OUTPATIENT
Start: 2025-01-01 | End: 2025-01-01

## 2025-01-01 RX ORDER — CYANOCOBALAMIN (VITAMIN B-12) 1000MCG/ML
1000 VIAL (ML) INJECTION DAILY
Refills: 0 | Status: DISCONTINUED | OUTPATIENT
Start: 2025-01-01 | End: 2025-01-01

## 2025-01-01 RX ORDER — BACTERIOSTATIC SODIUM CHLORIDE 0.9 %
4 VIAL (ML) INJECTION EVERY 6 HOURS
Refills: 0 | Status: DISCONTINUED | OUTPATIENT
Start: 2025-01-01 | End: 2025-01-01

## 2025-01-01 RX ORDER — BACTERIOSTATIC SODIUM CHLORIDE 0.9 %
500 VIAL (ML) INJECTION ONCE
Refills: 0 | Status: COMPLETED | OUTPATIENT
Start: 2025-01-01 | End: 2025-01-01

## 2025-01-01 RX ORDER — HYDRALAZINE HCL 100 MG
10 TABLET ORAL
Refills: 0 | Status: DISCONTINUED | OUTPATIENT
Start: 2025-01-01 | End: 2025-01-01

## 2025-01-01 RX ORDER — ROSUVASTATIN CALCIUM 10 MG/1
5 TABLET, FILM COATED ORAL AT BEDTIME
Refills: 0 | Status: DISCONTINUED | OUTPATIENT
Start: 2025-01-01 | End: 2025-01-01

## 2025-01-01 RX ORDER — OLANZAPINE 10 MG/1
5 TABLET, FILM COATED ORAL ONCE
Refills: 0 | Status: DISCONTINUED | OUTPATIENT
Start: 2025-01-01 | End: 2025-01-01

## 2025-01-01 RX ORDER — MAGNESIUM SULFATE 0.8 MEQ/ML
2 AMPUL (ML) INJECTION ONCE
Refills: 0 | Status: COMPLETED | OUTPATIENT
Start: 2025-01-01 | End: 2025-01-01

## 2025-01-01 RX ORDER — FOLIC ACID 1 MG
1 TABLET ORAL DAILY
Refills: 0 | Status: DISCONTINUED | OUTPATIENT
Start: 2025-01-01 | End: 2025-01-01

## 2025-01-01 RX ORDER — POTASSIUM CHLORIDE 750 MG/1
10 TABLET, EXTENDED RELEASE ORAL
Refills: 0 | Status: COMPLETED | OUTPATIENT
Start: 2025-01-01 | End: 2025-01-01

## 2025-01-01 RX ORDER — ACETAMINOPHEN 160 MG/5ML
650 SUSPENSION ORAL EVERY 6 HOURS
Refills: 0 | Status: DISCONTINUED | OUTPATIENT
Start: 2025-01-01 | End: 2025-01-01

## 2025-01-01 RX ORDER — HYDRALAZINE HCL 100 MG
5 TABLET ORAL ONCE
Refills: 0 | Status: COMPLETED | OUTPATIENT
Start: 2025-01-01 | End: 2025-01-01

## 2025-01-01 RX ORDER — BISACODYL 5 MG
10 TABLET, DELAYED RELEASE (ENTERIC COATED) ORAL DAILY
Refills: 0 | Status: DISCONTINUED | OUTPATIENT
Start: 2025-01-01 | End: 2025-01-01

## 2025-01-01 RX ORDER — CLOSTRIDIUM TETANI TOXOID ANTIGEN (FORMALDEHYDE INACTIVATED), CORYNEBACTERIUM DIPHTHERIAE TOXOID ANTIGEN (FORMALDEHYDE INACTIVATED), BORDETELLA PERTUSSIS TOXOID ANTIGEN (GLUTARALDEHYDE INACTIVATED), BORDETELLA PERTUSSIS FILAMENTOUS HEMAGGLUTININ ANTIGEN (FORMALDEHYDE INACTIVATED), BORDETELLA PERTUSSIS PERTACTIN ANTIGEN, AND BORDETELLA PERTUSSIS FIMBRIAE 2/3 ANTIGEN 5; 2; 2.5; 5; 3; 5 [LF]/.5ML; [LF]/.5ML; UG/.5ML; UG/.5ML; UG/.5ML; UG/.5ML
0.5 INJECTION, SUSPENSION INTRAMUSCULAR ONCE
Refills: 0 | Status: COMPLETED | OUTPATIENT
Start: 2025-01-01 | End: 2025-01-01

## 2025-01-01 RX ORDER — MEMANTINE HYDROCHLORIDE 7 MG/1
5 CAPSULE, EXTENDED RELEASE ORAL DAILY
Refills: 0 | Status: DISCONTINUED | OUTPATIENT
Start: 2025-01-01 | End: 2025-01-01

## 2025-01-01 RX ORDER — CEFTRIAXONE 250 MG/1
1000 INJECTION, POWDER, FOR SOLUTION INTRAMUSCULAR; INTRAVENOUS EVERY 24 HOURS
Refills: 0 | Status: DISCONTINUED | OUTPATIENT
Start: 2025-01-01 | End: 2025-01-01

## 2025-01-01 RX ORDER — MORPHINE SULFATE 60 MG/1
2 TABLET, FILM COATED, EXTENDED RELEASE ORAL EVERY 4 HOURS
Refills: 0 | Status: DISCONTINUED | OUTPATIENT
Start: 2025-01-01 | End: 2025-01-01

## 2025-01-01 RX ORDER — DESMOPRESSIN ACETATE 4 UG/ML
16 INJECTION, SOLUTION INTRAVENOUS; SUBCUTANEOUS ONCE
Refills: 0 | Status: COMPLETED | OUTPATIENT
Start: 2025-01-01 | End: 2025-01-01

## 2025-01-01 RX ORDER — SODIUM CHLORIDE 5 % 5 %
1000 INTRAVENOUS SOLUTION INTRAVENOUS
Refills: 0 | Status: DISCONTINUED | OUTPATIENT
Start: 2025-01-01 | End: 2025-01-01

## 2025-01-01 RX ORDER — BACTERIOSTATIC SODIUM CHLORIDE 0.9 %
1000 VIAL (ML) INJECTION
Refills: 0 | Status: DISCONTINUED | OUTPATIENT
Start: 2025-01-01 | End: 2025-01-01

## 2025-01-01 RX ORDER — ONDANSETRON 4 MG/1
4 TABLET, ORALLY DISINTEGRATING ORAL EVERY 6 HOURS
Refills: 0 | Status: DISCONTINUED | OUTPATIENT
Start: 2025-01-01 | End: 2025-01-01

## 2025-01-01 RX ORDER — DEXAMETHASONE SODIUM PHOSPHATE 4 MG/ML
4 INJECTION, SOLUTION INTRA-ARTICULAR; INTRALESIONAL; INTRAMUSCULAR; INTRAVENOUS; SOFT TISSUE ONCE
Refills: 0 | Status: COMPLETED | OUTPATIENT
Start: 2025-01-01 | End: 2025-01-01

## 2025-01-01 RX ORDER — LEVETIRACETAM 750 MG/1
250 TABLET, FILM COATED ORAL EVERY 12 HOURS
Refills: 0 | Status: DISCONTINUED | OUTPATIENT
Start: 2025-01-01 | End: 2025-01-01

## 2025-01-01 RX ORDER — LEVETIRACETAM 750 MG/1
1000 TABLET, FILM COATED ORAL ONCE
Refills: 0 | Status: COMPLETED | OUTPATIENT
Start: 2025-01-01 | End: 2025-01-01

## 2025-01-01 RX ORDER — MORPHINE SULFATE 60 MG/1
1 TABLET, FILM COATED, EXTENDED RELEASE ORAL
Refills: 0 | Status: DISCONTINUED | OUTPATIENT
Start: 2025-01-01 | End: 2025-01-01

## 2025-01-01 RX ORDER — POLYETHYLENE GLYCOL 3350 17 G/17G
17 POWDER, FOR SOLUTION ORAL
Refills: 0 | Status: DISCONTINUED | OUTPATIENT
Start: 2025-01-01 | End: 2025-01-01

## 2025-01-01 RX ORDER — LEVETIRACETAM 750 MG/1
500 TABLET, FILM COATED ORAL
Refills: 0 | Status: DISCONTINUED | OUTPATIENT
Start: 2025-01-01 | End: 2025-01-01

## 2025-01-01 RX ORDER — DONEPEZIL HYDROCHLORIDE 10 MG/1
5 TABLET, FILM COATED ORAL AT BEDTIME
Refills: 0 | Status: DISCONTINUED | OUTPATIENT
Start: 2025-01-01 | End: 2025-01-01

## 2025-01-01 RX ORDER — MUPIROCIN 2 G/100G
1 CREAM TOPICAL
Refills: 0 | Status: DISCONTINUED | OUTPATIENT
Start: 2025-01-01 | End: 2025-01-01

## 2025-01-01 RX ORDER — SENNOSIDES 8.6 MG
2 TABLET ORAL AT BEDTIME
Refills: 0 | Status: DISCONTINUED | OUTPATIENT
Start: 2025-01-01 | End: 2025-01-01

## 2025-01-01 RX ORDER — VANCOMYCIN HYDROCHLORIDE 50 MG/ML
1250 KIT ORAL EVERY 24 HOURS
Refills: 0 | Status: DISCONTINUED | OUTPATIENT
Start: 2025-01-01 | End: 2025-01-01

## 2025-01-01 RX ADMIN — DEXMEDETOMIDINE HYDROCHLORIDE 6.9 MICROGRAM(S)/KG/HR: 4 INJECTION, SOLUTION INTRAVENOUS at 08:36

## 2025-01-01 RX ADMIN — MORPHINE SULFATE 2 MILLIGRAM(S): 60 TABLET, FILM COATED, EXTENDED RELEASE ORAL at 09:34

## 2025-01-01 RX ADMIN — Medication 0.5 MILLIGRAM(S): at 11:00

## 2025-01-01 RX ADMIN — Medication 5 MILLIGRAM(S): at 01:37

## 2025-01-01 RX ADMIN — MORPHINE SULFATE 2 MILLIGRAM(S): 60 TABLET, FILM COATED, EXTENDED RELEASE ORAL at 01:17

## 2025-01-01 RX ADMIN — CEFTRIAXONE 100 MILLIGRAM(S): 250 INJECTION, POWDER, FOR SOLUTION INTRAMUSCULAR; INTRAVENOUS at 09:08

## 2025-01-01 RX ADMIN — LEVETIRACETAM 400 MILLIGRAM(S): 750 TABLET, FILM COATED ORAL at 06:24

## 2025-01-01 RX ADMIN — MORPHINE SULFATE 2 MILLIGRAM(S): 60 TABLET, FILM COATED, EXTENDED RELEASE ORAL at 05:00

## 2025-01-01 RX ADMIN — Medication 0.4 MILLIGRAM(S): at 16:08

## 2025-01-01 RX ADMIN — MORPHINE SULFATE 2 MILLIGRAM(S): 60 TABLET, FILM COATED, EXTENDED RELEASE ORAL at 08:45

## 2025-01-01 RX ADMIN — LEVETIRACETAM 250 MILLIGRAM(S): 750 TABLET, FILM COATED ORAL at 05:25

## 2025-01-01 RX ADMIN — ANTISEPTIC SURGICAL SCRUB 1 APPLICATION(S): 0.04 SOLUTION TOPICAL at 22:00

## 2025-01-01 RX ADMIN — MORPHINE SULFATE 2 MILLIGRAM(S): 60 TABLET, FILM COATED, EXTENDED RELEASE ORAL at 18:55

## 2025-01-01 RX ADMIN — MORPHINE SULFATE 2 MILLIGRAM(S): 60 TABLET, FILM COATED, EXTENDED RELEASE ORAL at 13:19

## 2025-01-01 RX ADMIN — MORPHINE SULFATE 2 MILLIGRAM(S): 60 TABLET, FILM COATED, EXTENDED RELEASE ORAL at 01:22

## 2025-01-01 RX ADMIN — DEXAMETHASONE SODIUM PHOSPHATE 4 MILLIGRAM(S): 4 INJECTION, SOLUTION INTRA-ARTICULAR; INTRALESIONAL; INTRAMUSCULAR; INTRAVENOUS; SOFT TISSUE at 12:53

## 2025-01-01 RX ADMIN — ACETAMINOPHEN 400 MILLIGRAM(S): 160 SUSPENSION ORAL at 18:24

## 2025-01-01 RX ADMIN — MORPHINE SULFATE 2 MILLIGRAM(S): 60 TABLET, FILM COATED, EXTENDED RELEASE ORAL at 05:24

## 2025-01-01 RX ADMIN — LEVETIRACETAM 250 MILLIGRAM(S): 750 TABLET, FILM COATED ORAL at 17:19

## 2025-01-01 RX ADMIN — ACETAMINOPHEN 400 MILLIGRAM(S): 160 SUSPENSION ORAL at 17:00

## 2025-01-01 RX ADMIN — Medication 0.4 MILLIGRAM(S): at 01:17

## 2025-01-01 RX ADMIN — Medication 500 MILLILITER(S): at 23:46

## 2025-01-01 RX ADMIN — DESMOPRESSIN ACETATE 216 MICROGRAM(S): 4 INJECTION, SOLUTION INTRAVENOUS; SUBCUTANEOUS at 06:24

## 2025-01-01 RX ADMIN — LEVETIRACETAM 250 MILLIGRAM(S): 750 TABLET, FILM COATED ORAL at 17:58

## 2025-01-01 RX ADMIN — ANTISEPTIC SURGICAL SCRUB 1 APPLICATION(S): 0.04 SOLUTION TOPICAL at 21:14

## 2025-01-01 RX ADMIN — ANTISEPTIC SURGICAL SCRUB 1 APPLICATION(S): 0.04 SOLUTION TOPICAL at 23:46

## 2025-01-01 RX ADMIN — MUPIROCIN 1 APPLICATION(S): 2 CREAM TOPICAL at 05:02

## 2025-01-01 RX ADMIN — MORPHINE SULFATE 2 MILLIGRAM(S): 60 TABLET, FILM COATED, EXTENDED RELEASE ORAL at 02:40

## 2025-01-01 RX ADMIN — LEVETIRACETAM 250 MILLIGRAM(S): 750 TABLET, FILM COATED ORAL at 05:29

## 2025-01-01 RX ADMIN — LEVETIRACETAM 250 MILLIGRAM(S): 750 TABLET, FILM COATED ORAL at 17:28

## 2025-01-01 RX ADMIN — Medication 0.4 MILLIGRAM(S): at 11:25

## 2025-01-01 RX ADMIN — Medication 20 MILLIGRAM(S): at 11:28

## 2025-01-01 RX ADMIN — Medication 0.4 MILLIGRAM(S): at 10:17

## 2025-01-01 RX ADMIN — Medication 0.5 MILLIGRAM(S): at 01:34

## 2025-01-01 RX ADMIN — LEVETIRACETAM 250 MILLIGRAM(S): 750 TABLET, FILM COATED ORAL at 06:46

## 2025-01-01 RX ADMIN — Medication 50 MILLILITER(S): at 07:35

## 2025-01-01 RX ADMIN — ACETAMINOPHEN 400 MILLIGRAM(S): 160 SUSPENSION ORAL at 16:08

## 2025-01-01 RX ADMIN — Medication 2 MILLIGRAM(S): at 02:12

## 2025-01-01 RX ADMIN — MORPHINE SULFATE 2 MILLIGRAM(S): 60 TABLET, FILM COATED, EXTENDED RELEASE ORAL at 21:35

## 2025-01-01 RX ADMIN — ACETAMINOPHEN 1000 MILLIGRAM(S): 160 SUSPENSION ORAL at 18:05

## 2025-01-01 RX ADMIN — Medication 1 MILLIGRAM(S): at 12:55

## 2025-01-01 RX ADMIN — MORPHINE SULFATE 2 MILLIGRAM(S): 60 TABLET, FILM COATED, EXTENDED RELEASE ORAL at 18:02

## 2025-01-01 RX ADMIN — POTASSIUM CHLORIDE 100 MILLIEQUIVALENT(S): 750 TABLET, EXTENDED RELEASE ORAL at 06:40

## 2025-01-01 RX ADMIN — MORPHINE SULFATE 2 MILLIGRAM(S): 60 TABLET, FILM COATED, EXTENDED RELEASE ORAL at 13:04

## 2025-01-01 RX ADMIN — MORPHINE SULFATE 2 MILLIGRAM(S): 60 TABLET, FILM COATED, EXTENDED RELEASE ORAL at 08:15

## 2025-01-01 RX ADMIN — MORPHINE SULFATE 2 MILLIGRAM(S): 60 TABLET, FILM COATED, EXTENDED RELEASE ORAL at 23:45

## 2025-01-01 RX ADMIN — LEVETIRACETAM 250 MILLIGRAM(S): 750 TABLET, FILM COATED ORAL at 06:18

## 2025-01-01 RX ADMIN — Medication 4 MILLILITER(S): at 11:57

## 2025-01-01 RX ADMIN — Medication 0.5 MILLIGRAM(S): at 17:01

## 2025-01-01 RX ADMIN — MORPHINE SULFATE 2 MILLIGRAM(S): 60 TABLET, FILM COATED, EXTENDED RELEASE ORAL at 14:16

## 2025-01-01 RX ADMIN — LEVETIRACETAM 400 MILLIGRAM(S): 750 TABLET, FILM COATED ORAL at 17:03

## 2025-01-01 RX ADMIN — MORPHINE SULFATE 2 MILLIGRAM(S): 60 TABLET, FILM COATED, EXTENDED RELEASE ORAL at 17:58

## 2025-01-01 RX ADMIN — MORPHINE SULFATE 2 MILLIGRAM(S): 60 TABLET, FILM COATED, EXTENDED RELEASE ORAL at 05:15

## 2025-01-01 RX ADMIN — MORPHINE SULFATE 2 MILLIGRAM(S): 60 TABLET, FILM COATED, EXTENDED RELEASE ORAL at 18:13

## 2025-01-01 RX ADMIN — ACETAMINOPHEN 650 MILLIGRAM(S): 160 SUSPENSION ORAL at 09:42

## 2025-01-01 RX ADMIN — Medication 0.4 MILLIGRAM(S): at 17:47

## 2025-01-01 RX ADMIN — MORPHINE SULFATE 2 MILLIGRAM(S): 60 TABLET, FILM COATED, EXTENDED RELEASE ORAL at 02:55

## 2025-01-01 RX ADMIN — ACETAMINOPHEN 650 MILLIGRAM(S): 160 SUSPENSION ORAL at 15:15

## 2025-01-01 RX ADMIN — MORPHINE SULFATE 2 MILLIGRAM(S): 60 TABLET, FILM COATED, EXTENDED RELEASE ORAL at 17:47

## 2025-01-01 RX ADMIN — ACETAMINOPHEN 1000 MILLIGRAM(S): 160 SUSPENSION ORAL at 12:45

## 2025-01-01 RX ADMIN — MORPHINE SULFATE 2 MILLIGRAM(S): 60 TABLET, FILM COATED, EXTENDED RELEASE ORAL at 18:40

## 2025-01-01 RX ADMIN — ACETAMINOPHEN 400 MILLIGRAM(S): 160 SUSPENSION ORAL at 12:30

## 2025-01-01 RX ADMIN — MORPHINE SULFATE 2 MILLIGRAM(S): 60 TABLET, FILM COATED, EXTENDED RELEASE ORAL at 21:50

## 2025-01-01 RX ADMIN — Medication 15 MILLIGRAM(S): at 18:24

## 2025-01-01 RX ADMIN — ACETAMINOPHEN 650 MILLIGRAM(S): 160 SUSPENSION ORAL at 12:57

## 2025-01-01 RX ADMIN — ACETAMINOPHEN 1000 MILLIGRAM(S): 160 SUSPENSION ORAL at 19:13

## 2025-01-01 RX ADMIN — MORPHINE SULFATE 2 MILLIGRAM(S): 60 TABLET, FILM COATED, EXTENDED RELEASE ORAL at 23:30

## 2025-01-01 RX ADMIN — LEVETIRACETAM 250 MILLIGRAM(S): 750 TABLET, FILM COATED ORAL at 05:02

## 2025-01-01 RX ADMIN — ACETAMINOPHEN 400 MILLIGRAM(S): 160 SUSPENSION ORAL at 21:09

## 2025-01-01 RX ADMIN — ACETAMINOPHEN 400 MILLIGRAM(S): 160 SUSPENSION ORAL at 23:46

## 2025-01-01 RX ADMIN — MORPHINE SULFATE 2 MILLIGRAM(S): 60 TABLET, FILM COATED, EXTENDED RELEASE ORAL at 21:17

## 2025-01-01 RX ADMIN — MORPHINE SULFATE 2 MILLIGRAM(S): 60 TABLET, FILM COATED, EXTENDED RELEASE ORAL at 14:31

## 2025-01-01 RX ADMIN — LEVETIRACETAM 250 MILLIGRAM(S): 750 TABLET, FILM COATED ORAL at 18:40

## 2025-01-01 RX ADMIN — ACETAMINOPHEN 400 MILLIGRAM(S): 160 SUSPENSION ORAL at 02:43

## 2025-01-01 RX ADMIN — VANCOMYCIN HYDROCHLORIDE 166.67 MILLIGRAM(S): KIT at 17:03

## 2025-01-01 RX ADMIN — LEVETIRACETAM 250 MILLIGRAM(S): 750 TABLET, FILM COATED ORAL at 17:46

## 2025-01-01 RX ADMIN — MORPHINE SULFATE 2 MILLIGRAM(S): 60 TABLET, FILM COATED, EXTENDED RELEASE ORAL at 05:39

## 2025-01-01 RX ADMIN — MORPHINE SULFATE 2 MILLIGRAM(S): 60 TABLET, FILM COATED, EXTENDED RELEASE ORAL at 09:43

## 2025-01-01 RX ADMIN — ACETAMINOPHEN 650 MILLIGRAM(S): 160 SUSPENSION ORAL at 11:38

## 2025-01-01 RX ADMIN — CEFTRIAXONE 100 MILLIGRAM(S): 250 INJECTION, POWDER, FOR SOLUTION INTRAMUSCULAR; INTRAVENOUS at 09:16

## 2025-01-01 RX ADMIN — Medication 25 GRAM(S): at 08:09

## 2025-01-01 RX ADMIN — MORPHINE SULFATE 2 MILLIGRAM(S): 60 TABLET, FILM COATED, EXTENDED RELEASE ORAL at 01:32

## 2025-01-01 RX ADMIN — LEVETIRACETAM 400 MILLIGRAM(S): 750 TABLET, FILM COATED ORAL at 02:41

## 2025-01-01 RX ADMIN — MORPHINE SULFATE 2 MILLIGRAM(S): 60 TABLET, FILM COATED, EXTENDED RELEASE ORAL at 12:01

## 2025-01-01 RX ADMIN — MORPHINE SULFATE 2 MILLIGRAM(S): 60 TABLET, FILM COATED, EXTENDED RELEASE ORAL at 09:19

## 2025-01-01 RX ADMIN — MUPIROCIN 1 APPLICATION(S): 2 CREAM TOPICAL at 17:04

## 2025-01-01 RX ADMIN — Medication 50 MILLILITER(S): at 20:59

## 2025-01-01 RX ADMIN — Medication 0.4 MILLIGRAM(S): at 00:21

## 2025-01-01 RX ADMIN — Medication 0.5 MILLIGRAM(S): at 00:38

## 2025-01-01 RX ADMIN — POTASSIUM CHLORIDE 100 MILLIEQUIVALENT(S): 750 TABLET, EXTENDED RELEASE ORAL at 06:39

## 2025-01-18 NOTE — ED PROVIDER NOTE - NS ED MD DISPO DIVISION
LV 6/2022. History of RUQ pain and bloating for at least 1 year, s/p oliver. 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 Possible constipation as well. Largely normal CT 8/2021. She has been having some RUQ discomfort, pulsation, over the past year or more. Lots of bloating, gas, distention. Some constipation, but better now with oatmeal, teas. She had an EGD 3 years ago in [facility]. She tells me she has chronic gastritis. Has been on omeprazole short times. Not a lot of reflux symptoms. No burning type pain. EGD showed atrophic gastritis with bx positive for gastric IM but neg for Hpylori, and otherwise normal exam. Colon with a single aphthous ulceration in the TI, diverticulosis, int hems, bx w acute on chronic ileitis. Repeat EGD suggested in 3 years, and avoid NSAIDs. FU now. She is feeling okay, occasional twinge of pain. She has taken NSAIDs in the past. No colonic symptoms. She continues with bloating, gas.
[facility]

## 2025-01-18 NOTE — ED PROVIDER NOTE - CLINICAL SUMMARY MEDICAL DECISION MAKING FREE TEXT BOX
RGUJRAL 83yo female hx dementia aox1 at baseline BIB EMS s/p unwitnessed fall and found down at the assisted living. + trauma to the head. On exam, patient is oriented x 1. + L frontal dressing for hematoma, no active bleeding. Patient is awake, alert.   Neck: C Collar in place, Changed to [employer] j.   Chest is clear to auscultation. +S1S2.  Abdomen is soft nondistended/nontender +BS. No rebound or guarding.   Pelvis is stable.   Check labs, UA, CT to eval for injury and fall. no previous record of tdap, pain control and monitor.

## 2025-01-18 NOTE — ED PROVIDER NOTE - PROGRESS NOTE DETAILS
Attending Morgan Garrison:  Patient was signed out to me, here s/p unwitnessed fall at nursing facility, hx of dementia aaox1 to person at baseline, with scalp hematoma. Agitated in ED. Given 2mg IV versed to facilitate medical imaging. I, Dr. Morgan Garrison, independently interpreted the : CTH with large Left IPH with shift. Neurosurg consulted. discussed with HCP Gabriel Noonan @ [phone]. DNR/DNI, no invasive procedures. Okay with medical therapy for now. Discussed likely poor functional and neurologic outcome given large bleed with dementia. Would like to proceed with medical therapies for now, pending rpt CTH. Ordered for keppra and ddavp. Attending Morgan Garrison:  discussed with neurosurg. admit to nscu.

## 2025-01-19 NOTE — PHYSICAL THERAPY INITIAL EVALUATION ADULT - GENERAL OBSERVATIONS, REHAB EVAL
Received in R sidelying with nursing staff, AxOx0, not responding to cues or opening eyes, noxious stimuli only.

## 2025-01-19 NOTE — CONSULT NOTE ADULT - SUBJECTIVE AND OBJECTIVE BOX
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 CTH showing new L temporal IPH. Patient admitted to NSCU and s/p ASA reversal with DDAVP.     Review of Systems:  unable to obtain as patient obtunded    Allergies:  No Known Allergies      PMHx/PSHx/Family Hx: As above, otherwise see below   HTN (hypertension)  Dementia  HTN (hypertension)        Social Hx:  No current use of tobacco, alcohol, or illicit drugs  Lives at facility    Medications:  MEDICATIONS  (STANDING):  cefTRIAXone   IVPB 1000 milliGRAM(s) IV Intermittent every 24 hours  chlorhexidine 4% Liquid 1 Application(s) Topical <User Schedule>  cyanocobalamin 1000 MICROGram(s) Oral daily  dexMEDEtomidine Infusion 0.3 MICROgram(s)/kG/Hr (6.9 mL/Hr) IV Continuous <Continuous>  donepezil 5 milliGRAM(s) Oral at bedtime  folic acid 1 milliGRAM(s) Oral daily  hydrALAZINE 10 milliGRAM(s) Oral two times a day  levETIRAcetam  IVPB 500 milliGRAM(s) IV Intermittent two times a day  memantine 5 milliGRAM(s) Oral daily  polyethylene glycol 3350 17 Gram(s) Oral two times a day  pyridoxine 50 milliGRAM(s) Oral two times a day  rosuvastatin 5 milliGRAM(s) Oral at bedtime  senna 2 Tablet(s) Oral at bedtime  sodium chloride 0.9%. 1000 milliLiter(s) (50 mL/Hr) IV Continuous <Continuous>    MEDICATIONS  (PRN):  acetaminophen     Tablet .. 650 milliGRAM(s) Oral every 6 hours PRN Temp greater or equal to 38C (100.4F), Mild Pain (1 - 3)      Vitals:  T(C): 36 (01-19-25 @ 11:00), Max: 36.7 (01-18-25 @ 22:05)  HR: 58 (01-19-25 @ 11:00) (55 - 78)  BP: 106/53 (01-19-25 @ 11:00) (74/42 - 151/61)  RR: 18 (01-19-25 @ 11:00) (15 - 49)  SpO2: 95% (01-19-25 @ 11:00) (93% - 98%)    Physical Examination:    General - female patient, in bed, no acute respiratory distress  Eyes: Conjunctiva and sclera clear  Neurologic:  - Mental Status: obtunded; does not awaken to verbal or tactile stimuli, moans to sternal rub without opening eyes  - Cranial Nerves II-XII:  Pupils are 1 mm, equal, round, and reactive to light. R pupil eccentric. no BTT b/l. corneal reflex present. Gag reflex deferred  - Motor:  No decorticate or decerebrate posturing on exam.  patient moves LUE >RUE on sternal rub,  L hand, moves all extremities to noxious stimuli, L side stronger than right in UE and LE  - Reflexes deferred  - Sensory: Grimaces to pain in all extremities.  - Coordination & Gait: Unable to obtain.    Labs:                        9.9    8.34  )-----------( 291      ( 18 Jan 2025 23:01 )             32.1     01-18    144  |  107  |  39[H]  ----------------------------<  82  4.8   |  22  |  1.17    Ca    9.4      18 Jan 2025 23:01  Phos  4.0     01-18  Mg     2.0     01-18    TPro  7.0  /  Alb  3.6  /  TBili  0.3  /  DBili  x   /  AST  23  /  ALT  11  /  AlkPhos  61  01-18    CAPILLARY BLOOD GLUCOSE      POCT Blood Glucose.: 75 mg/dL (18 Jan 2025 23:44)    LIVER FUNCTIONS - ( 18 Jan 2025 23:01 )  Alb: 3.6 g/dL / Pro: 7.0 g/dL / ALK PHOS: 61 U/L / ALT: 11 U/L / AST: 23 U/L / GGT: x             PT/INR - ( 18 Jan 2025 23:01 )   PT: 13.4 sec;   INR: 1.18 ratio         PTT - ( 18 Jan 2025 23:01 )  PTT:33.1 sec  CSF:        Radiology:  CT Head No Cont:  (19 Jan 2025 02:35)         CT BRAIN    CT CERVICAL SPINE 01/19/2025     CLINICAL INFORMATION: Trauma status post fall    CT BRAIN:    Motion degraded study. Study is partially limited secondary to patient positioning.  VENTRICLES AND SULCI: Normal in size and configuration.  INTRA-AXIAL: There is a left temporoparietal 7.3 x 4.5 x 6.2 cm intraparenchymal hemorrhage with mild surrounding edema exerting mass effect on the left occipital horn.  EXTRA-AXIAL: No mass or fluid collection. Basal cisterns are normal in appearance.  VISUALIZED SINUSES: Clear.  TYMPANOMASTOID CAVITIES: Clear.  VISUALIZED ORBITS: Bilateral lens replacement.  CALVARIUM: Intact.  MISCELLANEOUS: None.      CT CERVICAL SPINE:  These partially limited secondary to patient positioning.  VERTEBRAE: Normal in height. No acute fracture. Multilevel degenerative changes including marginal osteophytes.  ALIGNMENT: No subluxation or scoliosis.  INTERVERTEBRAL DISC SPACES: There is loss of disc height from C3 to C7 and associated degenerative endplate changes. There is mild spinal canal narrowing secondary to posterior osteophytic/disc complexes at C3-C7, especially with similar ventral cord compression at C4-C5 and C5-C6. Multilevel bilateral neuroforaminal narrowing, especially at C4-C5, and C5-C6., Secondary to uncovertebral spurring and facet hypertrophy.    VISUALIZED LUNGS: Clear.    MISCELLANEOUS: Similar left thyroid mass, now measuring 4.4 x 3.4 cm, previously measuring 4.8 x 5.1 cm, with cysts mild right tracheal deviation. Tracheal polyp.    IMPRESSION:    CT HEAD:  Intraparenchymal hemorrhage as described above.    CT CERVICAL SPINE:  No acute fracture or traumatic subluxation.    Multi-level degenerative changes.  Similar-appearing left thyroid mass with mild tracheal deviation. This would be amenable to FNA if not previously done.

## 2025-01-19 NOTE — OCCUPATIONAL THERAPY INITIAL EVALUATION ADULT - GENERAL OBSERVATIONS, REHAB EVAL
Pt received semi supine in bed, +lethargic with eyes closed, +IVL, +tele, +BP cuff, +pulse ox, +posey vest, +bilateral wrist restraint

## 2025-01-19 NOTE — CHART NOTE - NSCHARTNOTEFT_GEN_A_CORE
CAPRINI SCORE [CLOT] Score on Admission for     AGE RELATED RISK FACTORS                                                       MOBILITY RELATED FACTORS  [ ] Age 41-60 years                                            (1 Point)                  [ ] Bed rest                                                        (1 Point)  [ ] Age 61-74 years                                           (2 Points)                 [ ] Plaster cast                                                   (2 Points)  [x] Age > 75 years                                              (3 Points)                 [ ] Bed bound for more than 72 hours         (2 Points)    DISEASE RELATED RISK FACTORS                                               GENDER SPECIFIC FACTORS  [ ] Edema in the lower extremities                       (1 Point)           [ ] Pregnancy                                                          (1 Point)  [ ] Varicose veins                                               (1 Point)                  [ ] Post-partum < 6 weeks                                   (1 Point)             [ ] BMI > 25 Kg/m2                                            (1 Point)                  [ ] Hormonal therapy  or oral contraception   (1 Point)                 [ ] Sepsis (in the previous month)                        (1 Point)            [ ] History of pregnancy complications             (1 point)  [ ] Pneumonia or serious lung disease                                            [ ] Unexplained or recurrent               (1 Point)           (in the previous month)                               (1 Point)  [ ] Abnormal pulmonary function test                     (1 Point)                 SURGERY RELATED RISK FACTORS (include planned surgeries)  [ ] Acute myocardial infarction                              (1 Point)                 [ ]  Section                                             (1 Point)  [ ] Congestive heart failure (in the previous month)  (1 Point)        [ ] Minor surgery                                                  (1 Point)   [ ] Inflammatory bowel disease                             (1 Point)                 [ ] Arthroscopic surgery                                        (2 Points)  [ ] Central venous access                                      (2 Points)  [ ] History / presence of malignancy                   (2 points)   [ ] General surgery lasting more than 45 minutes   (2 Points)       [ ] Stroke (in the previous month)                          (5 Points)               [ ] Elective arthroplasty                                         (5 Points)                                                                                                                                               HEMATOLOGY RELATED FACTORS                                                 TRAUMA RELATED RISK FACTORS  [ ] Prior episodes of VTE                                     (3 Points)                [ ] Fracture of the hip, pelvis, or leg                       (5 Points)  [ ] Positive family history for VTE                         (3 Points)             [ ] Acute spinal cord injury (in the previous month)  (5 Points)  [ ] Prothrombin 24891 A                                     (3 Points)                [ ] Paralysis  (less than 1 month)                             (5 Points)  [ ] Factor V Leiden                                             (3 Points)                   [ ] Multiple Trauma within 1 month                        (5 Points)  [ ] Lupus anticoagulants                                     (3 Points)                                                           [ ] Anticardiolipin antibodies                               (3 Points)                                                       [ ] High homocysteine in the blood                      (3 Points)                                             [ ] Other congenital or acquired thrombophilia      (3 Points)                                                [ ] Heparin induced thrombocytopenia                  (3 Points)                                          Total Score [3]    Risk:  Very low 0   Low 1 to 2   Moderate 3 to 4   High =5       VTE Prophylaxis Recommendations:  [x] mechanical pneumatic compression devices                                      [ ] contraindicated: _____________________  [ ] chemoprophylaxis                                                                                    [ ] contraindicated: _____________________    **** HIGH LIKELIHOOD DVT PRESENT ON ADMISSION  [ ] (please order LE dopplers within 24 hours of admission)

## 2025-01-19 NOTE — PROGRESS NOTE ADULT - SUBJECTIVE AND OBJECTIVE BOX
SUMMARY:  84F DNR/DNI, hx dementia Ox1 at baseline, multiple falls on ASA81 presents from living facility for unwitnessed fall, found on floor with scalp hematoma. 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    ICU COURSE:  1/19: admitted to NSCU.    ADMISSION SCORES:   GCS: 9  ICH Score: 4    REVIEW OF SYSTEMS: None other than stated in HPI    ALLERGIES: Allergies    No Known Allergies    Intolerances        VITALS/DATA/ORDERS:    T(C): 36 (01-19-25 @ 04:30), Max: 36.7 (01-18-25 @ 22:05)  HR: 72 (01-19-25 @ 05:00) (69 - 78)  BP: 147/81 (01-19-25 @ 04:30) (128/74 - 151/61)  RR: 20 (01-19-25 @ 05:00) (15 - 25)  SpO2: 95% (01-19-25 @ 05:00) (93% - 98%)                          9.9    8.34  )-----------( 291      ( 18 Jan 2025 23:01 )             32.1       01-18    144  |  107  |  39[H]  ----------------------------<  82  4.8   |  22  |  1.17    Ca    9.4      18 Jan 2025 23:01  Mg     2.0     01-18    TPro  7.0  /  Alb  3.6  /  TBili  0.3  /  DBili  x   /  AST  23  /  ALT  11  /  AlkPhos  61  01-18              PT/INR - ( 18 Jan 2025 23:01 )   PT: 13.4 sec;   INR: 1.18 ratio         PTT - ( 18 Jan 2025 23:01 )  PTT:33.1 sec              acetaminophen     Tablet .. 650 milliGRAM(s) Oral every 6 hours PRN  chlorhexidine 4% Liquid 1 Application(s) Topical <User Schedule>  cyanocobalamin 1000 MICROGram(s) Oral daily  desmopressin IVPB 16 MICROGram(s) IV Intermittent Once  donepezil 5 milliGRAM(s) Oral at bedtime  folic acid 1 milliGRAM(s) Oral daily  hydrALAZINE 10 milliGRAM(s) Oral two times a day  levETIRAcetam  IVPB 500 milliGRAM(s) IV Intermittent two times a day  memantine 5 milliGRAM(s) Oral daily  polyethylene glycol 3350 17 Gram(s) Oral two times a day  pyridoxine 50 milliGRAM(s) Oral two times a day  rosuvastatin 5 milliGRAM(s) Oral at bedtime  senna 2 Tablet(s) Oral at bedtime  sodium chloride 0.9%. 1000 milliLiter(s) IV Continuous <Continuous>      EXAMINATION:  General: No acute distress  HEENT: Anicteric sclerae  Cardiac: [id number]  Lungs: Clear  Abdomen: Soft, non-tender, +BS  Extremities: No c/c/e  Skin/Incision Site: Clean, dry and intact  Neurologic: Awake, alert, fully oriented, follows commands, PERRL, VFFtc, EOMI, face symmetric, tongue midline, no drift, full strength SUMMARY:  84F DNR/DNI, hx dementia Ox1 at baseline, multiple falls on ASA81 presents from living facility for unwitnessed fall, found on floor with scalp hematoma. 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    ICU COURSE:  1/19: admitted to NSCU.    ADMISSION SCORES:   GCS: 9  ICH Score: 4    REVIEW OF SYSTEMS: None other than stated in HPI    ALLERGIES: Allergies    No Known Allergies    Intolerances        VITALS/DATA/ORDERS:    T(C): 36 (01-19-25 @ 04:30), Max: 36.7 (01-18-25 @ 22:05)  HR: 72 (01-19-25 @ 05:00) (69 - 78)  BP: 147/81 (01-19-25 @ 04:30) (128/74 - 151/61)  RR: 20 (01-19-25 @ 05:00) (15 - 25)  SpO2: 95% (01-19-25 @ 05:00) (93% - 98%)                          9.9    8.34  )-----------( 291      ( 18 Jan 2025 23:01 )             32.1       01-18    144  |  107  |  39[H]  ----------------------------<  82  4.8   |  22  |  1.17    Ca    9.4      18 Jan 2025 23:01  Mg     2.0     01-18    TPro  7.0  /  Alb  3.6  /  TBili  0.3  /  DBili  x   /  AST  23  /  ALT  11  /  AlkPhos  61  01-18              PT/INR - ( 18 Jan 2025 23:01 )   PT: 13.4 sec;   INR: 1.18 ratio         PTT - ( 18 Jan 2025 23:01 )  PTT:33.1 sec              acetaminophen     Tablet .. 650 milliGRAM(s) Oral every 6 hours PRN  chlorhexidine 4% Liquid 1 Application(s) Topical <User Schedule>  cyanocobalamin 1000 MICROGram(s) Oral daily  desmopressin IVPB 16 MICROGram(s) IV Intermittent Once  donepezil 5 milliGRAM(s) Oral at bedtime  folic acid 1 milliGRAM(s) Oral daily  hydrALAZINE 10 milliGRAM(s) Oral two times a day  levETIRAcetam  IVPB 500 milliGRAM(s) IV Intermittent two times a day  memantine 5 milliGRAM(s) Oral daily  polyethylene glycol 3350 17 Gram(s) Oral two times a day  pyridoxine 50 milliGRAM(s) Oral two times a day  rosuvastatin 5 milliGRAM(s) Oral at bedtime  senna 2 Tablet(s) Oral at bedtime  sodium chloride 0.9%. 1000 milliLiter(s) IV Continuous <Continuous>      EXAMINATION:  General: No acute distress  HEENT: Anicteric sclerae  Cardiac: [id number]  Lungs: Clear  Abdomen: Soft, non-tender, +BS  Extremities: No c/c/e  Skin/Incision Site: Clean, dry and intact  Neurologic: agitated, no EO, no FC, PERRL, BUE localize, BLE 2/5

## 2025-01-19 NOTE — PHYSICAL THERAPY INITIAL EVALUATION ADULT - ADDITIONAL COMMENTS
pt is poor historian, hx obtained from daughter at bedside; from [facility] memory care unit; non ambulatory since September 2024, reports staff pivot transfers into wheelchair, pt baseline AxOx1, conversant but not always appropriate; feeds self at LTC

## 2025-01-19 NOTE — H&P ADULT - NSHPPHYSICALEXAM_GEN_ALL_CORE
General: No Acute Distress     Neurological: Agitated, no EO, Ox0, PERRL, BAEZ spontaneously, BUE strong, BLE 2/5. Unable to assess drift and sensation    Pulmonary: Clear to Auscultation, No Rales, No Rhonchi, No Wheezes     Cardiovascular: S1, S2, Regular Rate and Rhythm     Gastrointestinal: Soft, Nontender, Nondistended General: Agitated, +scalp hematoma    Neurological: Agitated, QUIANA, Ox0, PERRL, not FC, BUE strong AG spontaneously, BLE 2/5 spontaneously, Unable to assess drift and sensation    Pulmonary: Clear to Auscultation, No Rales, No Rhonchi, No Wheezes     Cardiovascular: S1, S2, Regular Rate and Rhythm     Gastrointestinal: Soft, Nontender, Nondistended

## 2025-01-19 NOTE — ED ADULT NURSE REASSESSMENT NOTE - NS ED NURSE REASSESS COMMENT FT1
Patient brought to CT scan, unable to lie flat in straightened position. Patient is becoming hostile and uncooperative with RN and CT techs. Patient returned to [facility], will require medications before reattempting CT scan.

## 2025-01-19 NOTE — ED ADULT NURSE NOTE - OBJECTIVE STATEMENT
84yoF [facility] Dementia BIBEMS for unwitnessed fall. Patient coming from nursing home, was found on floor with apparent hematoma and bleeding from L side of head. Unknown how long patient was on floor, EMS reports likely less than 1 hour since last checked. EMS activated, placed patient in C-collar, bleeding controlled with pressure dressing. EMS reports patient has had multiple falls in past few days. Patient is combative towards medical staff, not oriented to place or time. Patient oriented to self but refuses to answer questions from ED staff. Per EMS report from nursing home, this is normal baseline for patient. Patient appears to be leaning towards R side, reports pain when attempting to assist patient to upright sitting position. Patient unable to localize pain.

## 2025-01-19 NOTE — PHYSICAL THERAPY INITIAL EVALUATION ADULT - PERTINENT HX OF CURRENT PROBLEM, REHAB EVAL
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

## 2025-01-19 NOTE — PROGRESS NOTE ADULT - ASSESSMENT
ASSESSMENT: HPI:  84F DNR/DNI, hx dementia Ox1 at baseline, multiple falls on ASA81 presents from living facility for unwitnessed fall, found on floor with scalp hematoma. CTH with new L temporal IPH (~[id number]).    NEURO:  Lt IPH s/p ASA reversal with DDAVP  Neuro q1, vitals q1   CT Head in AM  MR brain wwo- pending  Continue home meds memantine and donepezil  Keppra 500 BID for seizure ppx   Pain control with tylenol   Activity: [X] OOB as tolerated [] Bedrest [X] PT [X] OT [] PMNR    PULM:  Incentive spirometry  Sat >92% on room air   Mobilize as tolerated    CV:  Keep -150  Continue home hydralazine 10 BID   TTE- p     RENAL:  NS @50 until good PO intake  Na goal 140-150  BMP q12  Voiding    GI:  Diet: Dysphagia screen and then advance diet as tolerated  GI prophylaxis [X] not indicated [] PPI [] other:  Bowel regimen [] colace [X] senna [] other: miralax  Last BM: PTA     ENDO:   Goal euglycemia (-180)  A1C pending    HEME/ONC:  VTE prophylaxis: [X] SCDs [] chemoprophylaxis [X] hold chemoprophylaxis due to: acute Lt IPH   LED- p    ID:  Afebrile  Monitor for fevers  Pending UA    MISC:  Continue home cyanocobalamin, pyridoxine, and folic acid  SOCIAL/FAMILY:  [X] awaiting [] updated at bedside [] family meeting    CODE STATUS:  [] Full Code [X] DNR [X] DNI [] Palliative/Comfort Care    DISPOSITION:  [X] ICU [] Stroke Unit [] Floor [] EMU [] RCU [] PCU    [X] Patient is at high risk of neurologic deterioration/death due to: Lt IPH     Time seen:  Time spent: ___ [60] critical care minutes    Contact: [phone]

## 2025-01-19 NOTE — OCCUPATIONAL THERAPY INITIAL EVALUATION ADULT - ADDITIONAL COMMENTS
**as per daughter, patient has assistance with all functional activity. Transferred bed to wheelchair with full assist stand pivot, Has assist with all ADLs, able to feed self as baseline. Pt confused at baseline, unaware of family members names

## 2025-01-19 NOTE — OCCUPATIONAL THERAPY INITIAL EVALUATION ADULT - DIAGNOSIS, OT EVAL
Patient presents with decreased, vision, coordination, cognition, balance, strength, endurance impacting ability to perform ADLs and functional mobility

## 2025-01-19 NOTE — H&P ADULT - NSHPLABSRESULTS_GEN_ALL_CORE
LABS:                        9.9    8.34  )-----------( 291      ( 18 Jan 2025 23:01 )             32.1    01-18    144  |  107  |  39[H]  ----------------------------<  82  4.8   |  22  |  1.17    Ca    9.4      18 Jan 2025 23:01  Mg     2.0     01-18    TPro  7.0  /  Alb  3.6  /  TBili  0.3  /  DBili  x   /  AST  23  /  ALT  11  /  AlkPhos  61  01-18  PT/INR - ( 18 Jan 2025 23:01 )   PT: 13.4 sec;   INR: 1.18 ratio         PTT - ( 18 Jan 2025 23:01 )  PTT:33.1 sec      IMAGING:  < from: CT Abdomen and Pelvis No Cont (01.19.25 @ 02:37) >    IMPRESSION:  No CT evidence of acute traumatic injury in the chest, abdomen, or pelvis.    9 mm right lower lobe pulmonary nodule. Follow-up CT chest is recommended   in 3 months.    Similar left thyroid lobe mass which would be amenable to FNA.        --- End of Report ---            KEEGAN SANCHEZ MD; Attending Radiologist  This document has been electronically signed. Jan 19 2025  4:07AM    < end of copied text >    < from: CT Cervical Spine No Cont (01.19.25 @ 02:36) >    IMPRESSION:    CT HEAD:  Intraparenchymal hemorrhage as described above.    CT CERVICAL SPINE:  No acute fracture or traumatic subluxation.    Multi-level degenerative changes.  Similar-appearing left thyroid mass with mild tracheal deviation. This   would be amenable to FNA if not previously done.    Findings regarding intraparenchymal hemorrhage were discussed with Dr. Grace 1/19/2025 2:44 AM by Dr. Olivares with verbal confirmation.    --- End of Report ---          FLOWER OLIVARES DO; Resident Radiologist  This document has been electronically signed.  KEEGAN SANCHEZ MD; Attending Radiologist  This document has been electronically signed. Jan 19 2025  3:36AM    < end of copied text >

## 2025-01-19 NOTE — H&P ADULT - HISTORY OF PRESENT ILLNESS
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 CTH motion limited but new L temporal IPH (~7u4t5os) when compared to MR in September 2024 for dementia w/u which demonstrated chronic hypertensive microhemorrages in setting of amyloid angiopathy.

## 2025-01-19 NOTE — H&P ADULT - ASSESSMENT
ASSESSMENT: HPI:  84F DNR/DNI, hx dementia Ox1 at baseline, multiple falls on ASA81 presents from living facility for unwitnessed fall, found on floor with scalp hematoma. CTH with new L temporal IPH (~2k2m2om).    NEURO:  Lt IPH s/p ASA reversal with DDAVP  Neuro q1, vitals q1   CT Head in AM  MR brain wwo- pending  Continue home meds memantine and donepezil  Keppra 500 BID for seizure ppx   Pain control with tylenol   Activity: [X] OOB as tolerated [] Bedrest [X] PT [X] OT [] PMNR    PULM:  Incentive spirometry  Sat >92% on room air   Mobilize as tolerated    CV:  Keep -150  Continue home hydralazine 10 BID   TTE- p     RENAL:  NS @50 until good PO intake  Na goal 140-150  BMP q12    GI:  Diet: Dysphagia screen and then advance diet as tolerated  GI prophylaxis [X] not indicated [] PPI [] other:  Bowel regimen [] colace [X] senna [] other: miralax  Last BM: PTA     ENDO:   Goal euglycemia (-180)  A1C pending    HEME/ONC:  VTE prophylaxis: [X] SCDs [] chemoprophylaxis [X] hold chemoprophylaxis due to: acute Lt IPH   LED- p    ID:  Afebrile  Monitor for fevers  Pending UA    MISC:  Continue home cyanocobalamin, pyridoxine, and folic acid  SOCIAL/FAMILY:  [X] awaiting [] updated at bedside [] family meeting    CODE STATUS:  [] Full Code [X] DNR [X] DNI [] Palliative/Comfort Care    DISPOSITION:  [X] ICU [] Stroke Unit [] Floor [] EMU [] RCU [] PCU    [X] Patient is at high risk of neurologic deterioration/death due to: Lt IPH     Time seen:  Time spent: ___ [60] critical care minutes    Contact: [phone] ASSESSMENT: HPI:  84F DNR/DNI, hx dementia Ox1 at baseline, multiple falls on ASA81 presents from living facility for unwitnessed fall, found on floor with scalp hematoma. CTH with new L temporal IPH (~[id number]).    NEURO:  Lt IPH s/p ASA reversal with DDAVP  Neuro q1, vitals q1   CT Head in AM  MR brain wwo- pending  Continue home meds memantine and donepezil  Keppra 500 BID for seizure ppx   Pain control with tylenol   Activity: [X] OOB as tolerated [] Bedrest [X] PT [X] OT [] PMNR    PULM:  Incentive spirometry  Sat >92% on room air   Mobilize as tolerated    CV:  Keep -150  Continue home hydralazine 10 BID   TTE- p     RENAL:  NS @50 until good PO intake  Na goal 140-150  BMP q12  Voiding    GI:  Diet: Dysphagia screen and then advance diet as tolerated  GI prophylaxis [X] not indicated [] PPI [] other:  Bowel regimen [] colace [X] senna [] other: miralax  Last BM: PTA     ENDO:   Goal euglycemia (-180)  A1C pending    HEME/ONC:  VTE prophylaxis: [X] SCDs [] chemoprophylaxis [X] hold chemoprophylaxis due to: acute Lt IPH   LED- p    ID:  Afebrile  Monitor for fevers  Pending UA    MISC:  Continue home cyanocobalamin, pyridoxine, and folic acid  SOCIAL/FAMILY:  [X] awaiting [] updated at bedside [] family meeting    CODE STATUS:  [] Full Code [X] DNR [X] DNI [] Palliative/Comfort Care    DISPOSITION:  [X] ICU [] Stroke Unit [] Floor [] EMU [] RCU [] PCU    [X] Patient is at high risk of neurologic deterioration/death due to: Lt IPH     Time seen:  Time spent: ___ [60] critical care minutes    Contact: [phone]

## 2025-01-19 NOTE — PHYSICAL THERAPY INITIAL EVALUATION ADULT - TRANSFER TRAINING, PT EVAL
GOAL: Patient will perform bed<>wheelchair transfers modAx1 with least restrictive assistive device and proper hand placement in 4 weeks.

## 2025-01-19 NOTE — OCCUPATIONAL THERAPY INITIAL EVALUATION ADULT - PERTINENT HX OF CURRENT PROBLEM, REHAB EVAL
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 CTH motion limited but new L temporal IPH (~[id number]) when compared to MR in September 2024 for dementia w/u which demonstrated chronic hypertensive microhemorrages in setting of amyloid angiopathy.  CT Abd/pelvis (-) CT Cspine (-)

## 2025-01-19 NOTE — CONSULT NOTE ADULT - SUBJECTIVE AND OBJECTIVE BOX
p ([id number])     84F DNR/DNI, hx dementia Ox1 at baseline, multiple falls on ASA81 presents from living facility for fall. CTH motion limited but when compared to 12/14/25 scan w/ new L temporal IPH (~[id number]). 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 Exam: agitated, no EO, PERRL, BAEZ spon, BUE strong, BLE 2/5. Unable to assess drift, sensation.    --Anticoagulation:    =====================  PAST MEDICAL HISTORY   HTN (hypertension)    Dementia    HTN (hypertension)      PAST SURGICAL HISTORY   No significant past surgical history      No Known Allergies      MEDICATIONS:  Antibiotics:    Neuro:    Other:  desmopressin IVPB 16 MICROGram(s) IV Intermittent Once  diphtheria/tetanus/pertussis (acellular) Vaccine (Adacel) 0.5 milliLiter(s) IntraMuscular once      SOCIAL HISTORY:   Occupation:   Marital Status:     FAMILY HISTORY:  No pertinent family history in first degree relatives        ROS: Negative except per HPI    LABS:  PT/INR - ( 18 Jan 2025 23:01 )   PT: 13.4 sec;   INR: 1.18 ratio         PTT - ( 18 Jan 2025 23:01 )  PTT:33.1 sec                        9.9    8.34  )-----------( 291      ( 18 Jan 2025 23:01 )             32.1     01-18    144  |  107  |  39[H]  ----------------------------<  82  4.8   |  22  |  1.17    Ca    9.4      18 Jan 2025 23:01  Mg     2.0     01-18    TPro  7.0  /  Alb  3.6  /  TBili  0.3  /  DBili  x   /  AST  23  /  ALT  11  /  AlkPhos  61  01-18

## 2025-01-19 NOTE — PROGRESS NOTE ADULT - SUBJECTIVE AND OBJECTIVE BOX
NSCU ATTENDING -- ADDITIONAL PROGRESS NOTE    Nighttime rounds were performed -- please refer to earlier Progress Note for HPI details.    T(C): 39 (01-19-25 @ 19:00), Max: 39 (01-19-25 @ 19:00)  HR: 80 (01-19-25 @ 20:00) (55 - 80)  BP: 144/62 (01-19-25 @ 20:00) (74/42 - 151/61)  RR: 20 (01-19-25 @ 20:00) (15 - 49)  SpO2: 98% (01-19-25 @ 20:00) (90% - 98%)  Wt(kg): --    Relevant labwork and imaging reviewed.    MEDICATIONS  (STANDING):  acetaminophen   IVPB .. 1000 milliGRAM(s) IV Intermittent once  cefTRIAXone   IVPB 1000 milliGRAM(s) IV Intermittent every 24 hours  chlorhexidine 4% Liquid 1 Application(s) Topical <User Schedule>  cyanocobalamin 1000 MICROGram(s) Oral daily  donepezil 5 milliGRAM(s) Oral at bedtime  folic acid 1 milliGRAM(s) Oral daily  hydrALAZINE 10 milliGRAM(s) Oral two times a day  levETIRAcetam  IVPB 500 milliGRAM(s) IV Intermittent two times a day  memantine 5 milliGRAM(s) Oral daily  mupirocin 2% Nasal 1 Application(s) Both Nostrils two times a day  polyethylene glycol 3350 17 Gram(s) Oral two times a day  pyridoxine 50 milliGRAM(s) Oral two times a day  senna 2 Tablet(s) Oral at bedtime  sodium chloride 2% + sodium acetate 50:50 1000 milliLiter(s) (50 mL/Hr) IV Continuous <Continuous>  vancomycin  IVPB 1250 milliGRAM(s) IV Intermittent every 24 hours    MEDICATIONS  (PRN):  acetaminophen     Tablet .. 650 milliGRAM(s) Oral every 6 hours PRN Temp greater or equal to 38C (100.4F), Mild Pain (1 - 3)      [A/P]  2% @50cc/hr Na goal 145-155  DNR/DNI    ATTENDING STATEMENT:    Patient is critically ill, requiring critical care services.     Attending: I have personally and independently provided 30 minutes of critical care services.  This excludes any time spent on separate procedures or teaching.   NSCU ATTENDING -- ADDITIONAL PROGRESS NOTE    Nighttime rounds were performed -- please refer to earlier Progress Note for HPI details.    T(C): 39 (01-19-25 @ 19:00), Max: 39 (01-19-25 @ 19:00)  HR: 80 (01-19-25 @ 20:00) (55 - 80)  BP: 144/62 (01-19-25 @ 20:00) (74/42 - 151/61)  RR: 20 (01-19-25 @ 20:00) (15 - 49)  SpO2: 98% (01-19-25 @ 20:00) (90% - 98%)  Wt(kg): --    Relevant labwork and imaging reviewed.    MEDICATIONS  (STANDING):  acetaminophen   IVPB .. 1000 milliGRAM(s) IV Intermittent once  cefTRIAXone   IVPB 1000 milliGRAM(s) IV Intermittent every 24 hours  chlorhexidine 4% Liquid 1 Application(s) Topical <User Schedule>  cyanocobalamin 1000 MICROGram(s) Oral daily  donepezil 5 milliGRAM(s) Oral at bedtime  folic acid 1 milliGRAM(s) Oral daily  hydrALAZINE 10 milliGRAM(s) Oral two times a day  levETIRAcetam  IVPB 500 milliGRAM(s) IV Intermittent two times a day  memantine 5 milliGRAM(s) Oral daily  mupirocin 2% Nasal 1 Application(s) Both Nostrils two times a day  polyethylene glycol 3350 17 Gram(s) Oral two times a day  pyridoxine 50 milliGRAM(s) Oral two times a day  senna 2 Tablet(s) Oral at bedtime  sodium chloride 2% + sodium acetate 50:50 1000 milliLiter(s) (50 mL/Hr) IV Continuous <Continuous>  vancomycin  IVPB 1250 milliGRAM(s) IV Intermittent every 24 hours    MEDICATIONS  (PRN):  acetaminophen     Tablet .. 650 milliGRAM(s) Oral every 6 hours PRN Temp greater or equal to 38C (100.4F), Mild Pain (1 - 3)      [A/P]  palliative care consulted, GOC  2% @50cc/hr Na goal 145-155  cont antibiotics, f/u cultures   keppra 250mg Q12  DNR/DNI  Q4 checks   HOB>30, neutral neck position, aspiration precaution

## 2025-01-20 NOTE — PROGRESS NOTE ADULT - ASSESSMENT
ASSESSMENT: HPI:  84F DNR/DNI, hx dementia Ox1 at baseline, multiple falls on ASA81 presents from living facility for unwitnessed fall, found on floor with scalp hematoma. CTH with new L temporal IPH (~4b7i1sw).    NEURO:  Lt IPH s/p ASA reversal with DDAVP  Neuro q1, vitals q1   CT Head in AM  MR brain wwo- pending  Continue home meds memantine and donepezil  Keppra 500 BID for seizure ppx   Pain control with tylenol   Activity: [X] OOB as tolerated [] Bedrest [X] PT [X] OT [] PMNR    PULM:  Incentive spirometry  Sat >92% on room air   Mobilize as tolerated    CV:  Keep -150  Continue home hydralazine 10 BID   TTE- p     RENAL:  NS @50 until good PO intake  Na goal 140-150  BMP q12  Voiding    GI:  Diet: Dysphagia screen and then advance diet as tolerated  GI prophylaxis [X] not indicated [] PPI [] other:  Bowel regimen [] colace [X] senna [] other: miralax  Last BM: PTA     ENDO:   Goal euglycemia (-180)  A1C pending    HEME/ONC:  VTE prophylaxis: [X] SCDs [] chemoprophylaxis [X] hold chemoprophylaxis due to: acute Lt IPH   LED- p    ID:  Afebrile  Monitor for fevers  Pending UA    MISC:  Continue home cyanocobalamin, pyridoxine, and folic acid  SOCIAL/FAMILY:  [X] awaiting [] updated at bedside [] family meeting    CODE STATUS:  [] Full Code [X] DNR [X] DNI [] Palliative/Comfort Care    DISPOSITION:  [X] ICU [] Stroke Unit [] Floor [] EMU [] RCU [] PCU    [X] Patient is at high risk of neurologic deterioration/death due to: Lt IPH     Time seen:  Time spent: ___ [60] critical care minutes    Contact: [phone] ASSESSMENT: HPI:  84F DNR/DNI, hx dementia Ox1 at baseline, multiple falls on ASA81 presents from living facility for unwitnessed fall, found on floor with scalp hematoma. CTH with new L temporal IPH (~3i7w8ua).    NEURO:  Lt IPH s/p ASA reversal with DDAVP  Neuro q1, vitals q1   1/19 repeat CTH stable.  MR brain wwo- pending  Continue home meds memantine and donepezil  Keppra 500 BID for seizure ppx   Pain control with tylenol   Activity: [X] OOB as tolerated [] Bedrest [X] PT [X] OT [] PMNR    PULM:  Incentive spirometry  Sat >92% on room air   Mobilize as tolerated    CV:  Keep -150  Continue home hydralazine 10 BID   TTE- p     RENAL:  NS @50 until good PO intake  Na goal 140-150  BMP q12  Voiding    GI:  Diet: Dysphagia screen and then advance diet as tolerated  GI prophylaxis [X] not indicated [] PPI [] other:  Bowel regimen [] colace [X] senna [] other: miralax  Last BM: PTA     ENDO:   Goal euglycemia (-180)  A1C pending    HEME/ONC:  VTE prophylaxis: [X] SCDs [] chemoprophylaxis [X] hold chemoprophylaxis due to: acute Lt IPH   LED- p    ID:  Afebrile  Monitor for fevers  Pending UA    MISC:  Continue home cyanocobalamin, pyridoxine, and folic acid  SOCIAL/FAMILY:  [X] awaiting [] updated at bedside [] family meeting    CODE STATUS:  [] Full Code [X] DNR [X] DNI [] Palliative/Comfort Care    DISPOSITION:  [X] ICU [] Stroke Unit [] Floor [] EMU [] RCU [] PCU    [X] Patient is at high risk of neurologic deterioration/death due to: Lt IPH     Time seen:  Time spent: ___ [60] critical care minutes    Contact: [phone] ASSESSMENT: HPI:  84F DNR/DNI, hx dementia Ox1 at baseline, multiple falls on ASA81 presents from living facility for unwitnessed fall, found on floor with scalp hematoma. CTH with new L temporal IPH (~7n0t1ka).    NEURO:  Lt IPH s/p ASA reversal with DDAVP  Neuro q1, vitals q1   1/19 repeat CTH stable.  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  Continue home meds memantine and donepezil  Keppra 500 BID for seizure ppx   Pain control with tylenol   Activity: [X] OOB as tolerated [] Bedrest [X] PT [X] OT [] PMNR    PULM:  Incentive spirometry  Sat >92% on room air   Mobilize as tolerated    CV:  Keep -150  Continue home hydralazine 10 BID   TTE- p     RENAL:  NS @50 until good PO intake  Na goal 140-150  BMP q12  Voiding    GI:  Diet: Dysphagia screen and then advance diet as tolerated  GI prophylaxis [X] not indicated [] PPI [] other:  Bowel regimen [] colace [X] senna [] other: miralax  Last BM: PTA     ENDO:   Goal euglycemia (-180)  A1C pending    HEME/ONC:  VTE prophylaxis: [X] SCDs [] chemoprophylaxis [X] hold chemoprophylaxis due to: acute Lt IPH   LED- p    ID:  Febrile  Monitor for fevers  CTX for UTI and PNA    MISC:  Continue home cyanocobalamin, pyridoxine, and folic acid  SOCIAL/FAMILY:  [X] awaiting [] updated at bedside [] family meeting    CODE STATUS:  [] Full Code [X] DNR [X] DNI [] Palliative/Comfort Care    DISPOSITION:  [X] ICU [] Stroke Unit [] Floor [] EMU [] RCU [] PCU    [X] Patient is at high risk of neurologic deterioration/death due to: Lt IPH     Time seen:  Time spent: ___ [60] critical care minutes    Contact: [phone]

## 2025-01-20 NOTE — PATIENT PROFILE ADULT - FLU SEASON?
[facility] 35 PRE-ADMISSION TESTING GENERAL INSTRUCTIONS- [facility]-phone number:[phone]    GENERAL INSTRUCTIONS  [x] Antibacterial Soap shower Night before and/or AM of Surgery  [] Gigi wipe instruction sheet and wipes given. [x] Nothing by mouth after midnight, including gum, candy, mints, or water. [x] You may brush your teeth, gargle, but do NOT swallow water. []Hibiclens shower  the night before and the morning of surgery. Do not use             Hibiclens on your face or head. [x]No smoking, chewing tobacco, illegal drugs, or alcohol within 24 hours of your surgery. [x] Jewelry, valuables or body piercing's should not be brought to the hospital. All body and/or tongue piercing's must be removed prior to arriving to hospital.  ALL hair pins must be removed. [x] Do not wear makeup, lotions, powders, deodorant. Nail polish as directed by the nurse. [x] Arrange transportation to and from the hospital.  Arrange for someone to be with you for the remainder of the day and for 24 hours after your procedure due to having had anesthesia. [x] Bring insurance card and photo ID.  [] Transfusion Bracelet: Please bring with you to hospital, day of surgery  [] Bring urine specimen day of surgery. Any small container is acceptable. [] Use inhalers the morning of surgery and bring with you to hospital.   [x]Bring copy of living will or healthcare power of  papers to be placed in your electronic record. [] CPAP/BI-PAP: Please bring your machine if you are to spend the night in the hospital.     ENDOSCOPY INSTRUCTIONS:   [] Bowel prep instructions reviewed. [] Nothing by mouth after midnight, including gum, candy, mints, or water.  [] You may brush your teeth, gargle, but do NOT swallow water. [] Do not wear makeup, lotions, powders, deodorant. Nail polish as directed by the nurse.   [] Arrange transportation to and from the hospital.  Arrange for someone to be with you for the
Pre op instructions given and post op expectations discussed.  Patient verbalizes understanding
Yes...

## 2025-01-20 NOTE — SPEECH LANGUAGE PATHOLOGY EVALUATION - SLP DIAGNOSIS
Pt seen for bedside swallow and speech language evaluations with rounding stroke team. Pt unable to achieve or maintain arousal despite maximal verbal, tactile, and noxious stimulation. Deemed inappropriate for dysphagia and speech and language w/u. Family expressed interest in palliative services and comfort care. Stroke team to consult palliative care, this service will not actively follow given GOC and pt's medical status. Stroke team and pt's daughter in agreement with above. This service will sign off, please reconsult pending change in status.

## 2025-01-20 NOTE — PATIENT PROFILE ADULT - FUNCTIONAL ASSESSMENT - DAILY ACTIVITY 2.
Discharge instructions were given to the patient by Janie Villar RN. The patient left the Emergency Department ambulatory, alert and oriented and in no acute distress with 4 prescriptions. The patient was encouraged to call or return to the ED for worsening issues or problems and was encouraged to schedule a follow up appointment for continuing care. The patient verbalized understanding of discharge instructions and prescriptions, all questions were answered. The patient has no further concerns at this time.
Per Raul Renteria MD, pt appears anxious and will wait in the ED until feeling better.
Pt presents ambulatory to ED complaining of chest pain, SOB, cough, sore throat, wheezing noted. Pt is alert and oriented x 4, skin is warm and dry. Assesment completed and pt updated on plan of care. Emergency Department Nursing Plan of Care       The Nursing Plan of Care is developed from the Nursing assessment and Emergency Department Attending provider initial evaluation. The plan of care may be reviewed in the ED Provider note.     The Plan of Care was developed with the following considerations:   Patient / Family readiness to learn indicated by:verbalized understanding  Persons(s) to be included in education: patient  Barriers to Learning/Limitations:No    Signed     Manolo Collins RN    5/16/2017   11:43 PM
1 = Total assistance

## 2025-01-20 NOTE — PATIENT PROFILE ADULT - FALL HARM RISK - HARM RISK INTERVENTIONS
Assistance with ambulation/Assistance OOB with selected safe patient handling equipment/Communicate Risk of Fall with Harm to all staff/Discuss with provider need for PT consult/Monitor gait and stability/Provide patient with walking aids - walker, cane, crutches/Reinforce activity limits and safety measures with patient and family/Review medications for side effects contributing to fall risk/Sit up slowly, dangle for a short time, stand at bedside before walking/Tailored Fall Risk Interventions/Toileting schedule using arm’s reach rule for commode and bathroom/Visual Cue: Yellow wristband and red socks/Bed in lowest position, wheels locked, appropriate side rails in place/Call bell, personal items and telephone in reach/Instruct patient to call for assistance before getting out of bed or chair/Non-slip footwear when patient is out of bed/[place] to call system/Physically safe environment - no spills, clutter or unnecessary equipment/Purposeful Proactive Rounding/Room/bathroom lighting operational, light cord in reach Assistance with ambulation/Assistance OOB with selected safe patient handling equipment/Communicate Risk of Fall with Harm to all staff/Reinforce activity limits and safety measures with patient and family/Review medications for side effects contributing to fall risk/Sit up slowly, dangle for a short time, stand at bedside before walking/Tailored Fall Risk Interventions/Toileting schedule using arm’s reach rule for commode and bathroom/Visual Cue: Yellow wristband and red socks/Bed in lowest position, wheels locked, appropriate side rails in place/Call bell, personal items and telephone in reach/Instruct patient to call for assistance before getting out of bed or chair/Non-slip footwear when patient is out of bed/[place] to call system/Physically safe environment - no spills, clutter or unnecessary equipment/Purposeful Proactive Rounding/Room/bathroom lighting operational, light cord in reach

## 2025-01-20 NOTE — SWALLOW BEDSIDE ASSESSMENT ADULT - SLP PERTINENT HISTORY OF CURRENT PROBLEM
On admit: Neurological: Agitated, QUIANA, Ox0, PERRL, not FC, BUE strong AG spontaneously, BLE 2/5 spontaneously, Unable to assess drift and sensation. 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

## 2025-01-20 NOTE — PATIENT PROFILE ADULT - NSPROSPHOSPCHAPLAINYN_GEN_A_NUR
moderate assist (50% patients effort)
patient unable to answer all questions due to mental status/yes

## 2025-01-20 NOTE — PROGRESS NOTE ADULT - SUBJECTIVE AND OBJECTIVE BOX
NSCU ATTENDING -- ADDITIONAL PROGRESS NOTE    Nighttime rounds were performed -- please refer to earlier Progress Note for HPI details.    ICU Vital Signs Last 24 Hrs  T(C): 37.4 (20 Jan 2025 15:00), Max: 38.9 (19 Jan 2025 20:00)  T(F): 99.3 (20 Jan 2025 15:00), Max: 102 (19 Jan 2025 20:00)  HR: 57 (20 Jan 2025 15:00) (57 - 90)  BP: 162/72 (20 Jan 2025 15:00) (113/80 - 184/70)  BP(mean): 104 (20 Jan 2025 15:00) (89 - 117)  ABP: --  ABP(mean): --  RR: 22 (20 Jan 2025 15:00) (14 - 34)  SpO2: 100% (20 Jan 2025 15:00) (97% - 100%)    O2 Parameters below as of 20 Jan 2025 12:12  Patient On (Oxygen Delivery Method): nasal cannula    CBC:            9.9    8.34  )-----------( 291      ( 01-18-25 @ 23:01 )             32.1         Chem:         ( 01-20-25 @ 12:06 )    143  |  107  |  27[H]  ----------------------------<  86  4.0   |  23  |  0.96        Liver Functions: ( 01-18-25 @ 23:01 )  Alb: 3.6 g/dL / Pro: 7.0 g/dL / ALK PHOS: 61 U/L / ALT: 11 U/L / AST: 23 U/L / GGT: x            MEDICATIONS  (STANDING):  chlorhexidine 4% Liquid 1 Application(s) Topical <User Schedule>  levETIRAcetam   Injectable 250 milliGRAM(s) IV Push every 12 hours    MEDICATIONS  (PRN):  acetaminophen     Tablet .. 650 milliGRAM(s) Oral every 6 hours PRN Temp greater or equal to 38C (100.4F), Mild Pain (1 - 3)  glycopyrrolate Injectable 0.4 milliGRAM(s) IV Push four times a day PRN Secretions  LORazepam   Injectable 0.5 milliGRAM(s) IV Push every 4 hours PRN Anxiety  morphine  - Injectable 2 milliGRAM(s) IV Push every 2 hours PRN Moderate pain (4-6), Severe pain (7-10), Respiratory rate greater than 22  ondansetron Injectable 4 milliGRAM(s) IV Push every 6 hours PRN Nausea and/or Vomiting      [A/P]  DNR/DNI  CMO  cont keppra 250mg Q12  prn ativan and morphine, glycopyrrolate   HOB>30, neutral neck position, aspiration precaution   palliative care consult pending

## 2025-01-20 NOTE — SPEECH LANGUAGE PATHOLOGY EVALUATION - H & P REVIEW
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 CTH motion limited but new L temporal IPH (~[id number]) when compared to MR in September 2024 for dementia w/u which demonstrated chronic hypertensive microhemorrages in setting of amyloid angiopathy. Pt admitted for L IPH./yes

## 2025-01-20 NOTE — PROGRESS NOTE ADULT - SUBJECTIVE AND OBJECTIVE BOX
SUMMARY:  84F DNR/DNI, hx dementia Ox1 at baseline, multiple falls on ASA81 presents from living facility for unwitnessed fall, found on floor with scalp hematoma. CTH motion limited but new L temporal IPH (~4r7l7ng) when compared to MR in September 2024 for dementia w/u which demonstrated chronic hypertensive microhemorrages in setting of amyloid angiopathy.    ICU COURSE:  1/19: admitted to NSCU.    ADMISSION SCORES:   GCS: 9  ICH Score: 4    REVIEW OF SYSTEMS: None other than stated in HPI    ALLERGIES: Allergies    No Known Allergies    Intolerances        VITALS/DATA/ORDERS:  ICU Vital Signs Last 24 Hrs  T(C): 37.4 (20 Jan 2025 03:00), Max: 39 (19 Jan 2025 19:00)  T(F): 99.3 (20 Jan 2025 03:00), Max: 102.2 (19 Jan 2025 19:00)  HR: 60 (20 Jan 2025 05:00) (55 - 90)  BP: 144/67 (20 Jan 2025 05:00) (74/42 - 184/70)  BP(mean): 97 (20 Jan 2025 05:00) (54 - 104)  ABP: --  ABP(mean): --  RR: 20 (20 Jan 2025 05:00) (14 - 49)  SpO2: 100% (20 Jan 2025 05:00) (90% - 100%)    O2 Parameters below as of 19 Jan 2025 19:00  Patient On (Oxygen Delivery Method): nasal cannula  O2 Flow (L/min): 2      LABS:                        9.9    8.34  )-----------( 291      ( 18 Jan 2025 23:01 )             32.1       01-20    144  |  108  |  29[H]  ----------------------------<  88  3.9   |  22  |  1.00    Ca    8.7      20 Jan 2025 04:31  Phos  4.0     01-20  Mg     1.8     01-20    TPro  7.0  /  Alb  3.6  /  TBili  0.3  /  DBili  x   /  AST  23  /  ALT  11  /  AlkPhos  61  01-18              PT/INR - ( 18 Jan 2025 23:01 )   PT: 13.4 sec;   INR: 1.18 ratio         PTT - ( 18 Jan 2025 23:01 )  PTT:33.1 sec        MEDICATIONS  (STANDING):  cefTRIAXone   IVPB 1000 milliGRAM(s) IV Intermittent every 24 hours  chlorhexidine 4% Liquid 1 Application(s) Topical <User Schedule>  cyanocobalamin 1000 MICROGram(s) Oral daily  donepezil 5 milliGRAM(s) Oral at bedtime  folic acid 1 milliGRAM(s) Oral daily  hydrALAZINE 10 milliGRAM(s) Oral two times a day  levETIRAcetam   Injectable 250 milliGRAM(s) IV Push every 12 hours  magnesium sulfate  IVPB 2 Gram(s) IV Intermittent once  memantine 5 milliGRAM(s) Oral daily  mupirocin 2% Nasal 1 Application(s) Both Nostrils two times a day  polyethylene glycol 3350 17 Gram(s) Oral two times a day  pyridoxine 50 milliGRAM(s) Oral two times a day  senna 2 Tablet(s) Oral at bedtime  sodium chloride 2% + sodium acetate 50:50 1000 milliLiter(s) (50 mL/Hr) IV Continuous <Continuous>  vancomycin  IVPB 1250 milliGRAM(s) IV Intermittent every 24 hours    MEDICATIONS  (PRN):  acetaminophen     Tablet .. 650 milliGRAM(s) Oral every 6 hours PRN Temp greater or equal to 38C (100.4F), Mild Pain (1 - 3)            EXAMINATION:  General: No acute distress  HEENT: Anicteric sclerae  Cardiac: [id number]  Lungs: Clear  Abdomen: Soft, non-tender, +BS  Extremities: No c/c/e  Skin/Incision Site: Clean, dry and intact  Neurologic: No EO, pupils PERRL, no FC, BUE localize, BLE withdraw 2/5.

## 2025-01-20 NOTE — SWALLOW BEDSIDE ASSESSMENT ADULT - SWALLOW EVAL: DIAGNOSIS
Chart reviewed, pt to be seen for speech and swallow evaluations Pt seen for bedside swallow and speech language evaluations with rounding stroke team. Pt unable to achieve or maintain arousal despite maximal verbal, tactile, and noxious stimulation. Deemed inappropriate for dysphagia and speech and language w/u. Family expressed interest in palliative services and comfort care. Stroke team to consult palliative care, this service will not actively follow given GOC and pt's medical status. Stroke team and pt's daughter in agreement with above. This service will sign off, please reconsult pending change in status.

## 2025-01-20 NOTE — SWALLOW BEDSIDE ASSESSMENT ADULT - H & P REVIEW
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 CTH motion limited but new L temporal IPH (~6m4l4xj) when compared to MR in September 2024 for dementia w/u which demonstrated chronic hypertensive microhemorrages in setting of amyloid angiopathy. Pt admitted for L IPH./yes

## 2025-01-20 NOTE — PATIENT PROFILE ADULT - FALL HARM RISK - FACTORS
IV and/or equipment tethered to patient/Medication side effects total care/ immobile/IV and/or equipment tethered to patient/Medication side effects

## 2025-01-21 NOTE — DIETITIAN INITIAL EVALUATION ADULT - PERTINENT MEDS FT
MEDICATIONS  (STANDING):  chlorhexidine 4% Liquid 1 Application(s) Topical <User Schedule>  levETIRAcetam   Injectable 250 milliGRAM(s) IV Push every 12 hours    MEDICATIONS  (PRN):  acetaminophen     Tablet .. 650 milliGRAM(s) Oral every 6 hours PRN Temp greater or equal to 38C (100.4F), Mild Pain (1 - 3)  glycopyrrolate Injectable 0.4 milliGRAM(s) IV Push four times a day PRN Secretions  LORazepam   Injectable 0.5 milliGRAM(s) IV Push every 4 hours PRN Anxiety  morphine  - Injectable 2 milliGRAM(s) IV Push every 2 hours PRN Moderate pain (4-6), Severe pain (7-10), Respiratory rate greater than 22  ondansetron Injectable 4 milliGRAM(s) IV Push every 6 hours PRN Nausea and/or Vomiting

## 2025-01-21 NOTE — DIETITIAN INITIAL EVALUATION ADULT - REASON FOR ADMISSION
per chart: "84F DNR/DNI, hx dementia Ox1 at baseline, multiple falls on ASA81 presents from living facility for unwitnessed fall, found on floor with scalp hematoma."

## 2025-01-21 NOTE — DIETITIAN INITIAL EVALUATION ADULT - PERTINENT LABORATORY DATA
01-20    143  |  107  |  27[H]  ----------------------------<  86  4.0   |  23  |  0.96    Ca    8.8      20 Jan 2025 12:06  Phos  3.9     01-20  Mg     2.3     01-20    A1C with Estimated Average Glucose Result: 5.4 % (01-19-25 @ 05:57)

## 2025-01-21 NOTE — DIETITIAN INITIAL EVALUATION ADULT - REASON INDICATOR FOR ASSESSMENT
pressure injury stage 2 or >. information obtained from nursing, electronic medical record, team during interdisciplinary rounds

## 2025-01-21 NOTE — PROGRESS NOTE ADULT - SUBJECTIVE AND OBJECTIVE BOX
NSCU ATTENDING -- ADDITIONAL PROGRESS NOTE    Nighttime rounds were performed -- please refer to earlier Progress Note for HPI details.    ICU Vital Signs Last 24 Hrs  T(C): 38.7 (21 Jan 2025 17:00), Max: 38.7 (21 Jan 2025 17:00)  T(F): 101.7 (21 Jan 2025 17:00), Max: 101.7 (21 Jan 2025 17:00)  HR: 81 (21 Jan 2025 15:00) (63 - 81)  BP: 126/60 (21 Jan 2025 15:00) (119/58 - 153/68)  BP(mean): 87 (21 Jan 2025 15:00) (83 - 98)  ABP: --  ABP(mean): --  RR: 22 (21 Jan 2025 15:00) (19 - 27)  SpO2: 78% (21 Jan 2025 15:00) (66% - 98%)    O2 Parameters below as of 21 Jan 2025 07:00  Patient On (Oxygen Delivery Method): room air    CBC:     Chem:         ( 01-20-25 @ 12:06 )    143  |  107  |  27[H]  ----------------------------<  86  4.0   |  23  |  0.96      MEDICATIONS  (STANDING):  chlorhexidine 4% Liquid 1 Application(s) Topical <User Schedule>  levETIRAcetam   Injectable 250 milliGRAM(s) IV Push every 12 hours    MEDICATIONS  (PRN):  acetaminophen     Tablet .. 650 milliGRAM(s) Oral every 6 hours PRN Temp greater or equal to 38C (100.4F), Mild Pain (1 - 3)  glycopyrrolate Injectable 0.4 milliGRAM(s) IV Push four times a day PRN Secretions  LORazepam   Injectable 0.5 milliGRAM(s) IV Push every 4 hours PRN Anxiety  morphine  - Injectable 2 milliGRAM(s) IV Push every 2 hours PRN Moderate pain (4-6), Severe pain (7-10), Respiratory rate greater than 22  ondansetron Injectable 4 milliGRAM(s) IV Push every 6 hours PRN Nausea and/or Vomiting          [A/P]  DNR/DNI  CMO  cont keppra 250mg Q12  prn ativan and morphine, glycopyrrolate   HOB>30, neutral neck position, aspiration precaution   palliative care consult pending

## 2025-01-21 NOTE — PROGRESS NOTE ADULT - SUBJECTIVE AND OBJECTIVE BOX
SUMMARY:  84F DNR/DNI, hx dementia Ox1 at baseline, multiple falls on ASA81 presents from living facility for unwitnessed fall, found on floor with scalp hematoma. 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    ICU COURSE:  1/19: admitted to NSCU.    ADMISSION SCORES:   GCS: 9  ICH Score: 4    REVIEW OF SYSTEMS: None other than stated in HPI    ALLERGIES: Allergies    No Known Allergies    Intolerances    ICU Vital Signs Last 24 Hrs  T(C): 38.4 (21 Jan 2025 03:00), Max: 38.4 (21 Jan 2025 03:00)  T(F): 101.1 (21 Jan 2025 03:00), Max: 101.1 (21 Jan 2025 03:00)  HR: 72 (21 Jan 2025 03:00) (57 - 81)  BP: 119/58 (21 Jan 2025 03:00) (119/58 - 164/71)  BP(mean): 83 (21 Jan 2025 03:00) (83 - 117)  ABP: --  ABP(mean): --  RR: 24 (21 Jan 2025 03:00) (20 - 34)  SpO2: 98% (21 Jan 2025 03:00) (98% - 100%)    O2 Parameters below as of 20 Jan 2025 19:00  Patient On (Oxygen Delivery Method): room air    01-20    143  |  107  |  27[H]  ----------------------------<  86  4.0   |  23  |  0.96    Ca    8.8      20 Jan 2025 12:06  Phos  3.9     01-20  Mg     2.3     01-20        MEDICATIONS  (STANDING):  cefTRIAXone   IVPB 1000 milliGRAM(s) IV Intermittent every 24 hours  chlorhexidine 4% Liquid 1 Application(s) Topical <User Schedule>  cyanocobalamin 1000 MICROGram(s) Oral daily  donepezil 5 milliGRAM(s) Oral at bedtime  folic acid 1 milliGRAM(s) Oral daily  hydrALAZINE 10 milliGRAM(s) Oral two times a day  levETIRAcetam   Injectable 250 milliGRAM(s) IV Push every 12 hours  magnesium sulfate  IVPB 2 Gram(s) IV Intermittent once  memantine 5 milliGRAM(s) Oral daily  mupirocin 2% Nasal 1 Application(s) Both Nostrils two times a day  polyethylene glycol 3350 17 Gram(s) Oral two times a day  pyridoxine 50 milliGRAM(s) Oral two times a day  senna 2 Tablet(s) Oral at bedtime  sodium chloride 2% + sodium acetate 50:50 1000 milliLiter(s) (50 mL/Hr) IV Continuous <Continuous>  vancomycin  IVPB 1250 milliGRAM(s) IV Intermittent every 24 hours    MEDICATIONS  (PRN):  acetaminophen     Tablet .. 650 milliGRAM(s) Oral every 6 hours PRN Temp greater or equal to 38C (100.4F), Mild Pain (1 - 3)            EXAMINATION:  General: No acute distress  HEENT: Anicteric sclerae  Cardiac: [id number]  Lungs: Clear  Abdomen: Soft, non-tender, +BS  Extremities: No c/c/e  Skin/Incision Site: Clean, dry and intact  Neurologic: No EO, pupils PERRL, no FC, BUE localize, BLE withdraw 2/5.

## 2025-01-21 NOTE — PROGRESS NOTE ADULT - ASSESSMENT
ASSESSMENT: HPI:  84F DNR/DNI, hx dementia Ox1 at baseline, multiple falls on ASA81 presents from living facility for unwitnessed fall, found on floor with scalp hematoma. CTH with new L temporal IPH (~[id number]).    NEURO:  Lt IPH s/p ASA reversal with DDAVP  Neuro q1, vitals q1   1/19 repeat CTH stable.  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  Continue home meds memantine and donepezil  Keppra 500 BID for seizure ppx   Pain control with tylenol   Activity: [X] OOB as tolerated [] Bedrest [X] PT [X] OT [] PMNR    PULM:  Incentive spirometry  Sat >92% on room air   Mobilize as tolerated    CV:  Keep -150  Continue home hydralazine 10 BID   TTE- p     RENAL:  NS @50 until good PO intake  Na goal 140-150  BMP q12  Voiding    GI:  Diet: Dysphagia screen and then advance diet as tolerated  GI prophylaxis [X] not indicated [] PPI [] other:  Bowel regimen [] colace [X] senna [] other: miralax  Last BM: PTA     ENDO:   Goal euglycemia (-180)  A1C pending    HEME/ONC:  VTE prophylaxis: [X] SCDs [] chemoprophylaxis [X] hold chemoprophylaxis due to: acute Lt IPH   LED- p    ID:  Febrile  Monitor for fevers  CTX for UTI and PNA    MISC:  Continue home cyanocobalamin, pyridoxine, and folic acid  SOCIAL/FAMILY:  [X] awaiting [] updated at bedside [] family meeting    CODE STATUS:  [] Full Code [X] DNR [X] DNI [] Palliative/Comfort Care    DISPOSITION:  [X] ICU [] Stroke Unit [] Floor [] EMU [] RCU [] PCU    [X] Patient is at high risk of neurologic deterioration/death due to: Lt IPH     Time seen:  Time spent: ___ [60] critical care minutes    Contact: [phone]

## 2025-01-21 NOTE — DIETITIAN INITIAL EVALUATION ADULT - NSFNSPHYEXAMSKINFT_GEN_A_CORE
Pressure Injury 1: sacrum, Suspected deep tissue injury  Pressure Injury 2: lower, back, Suspected deep tissue injury  Pressure Injury 3: Bilateral:, heel, Suspected deep tissue injury  Pressure Injury 4: none, none  Pressure Injury 5: none, none  Pressure Injury 6: none, none  Pressure Injury 7: none, none  Pressure Injury 8: none, none  Pressure Injury 9: none, none  Pressure Injury 10: none, none  Pressure Injury 11: none, none

## 2025-01-22 NOTE — CONSULT NOTE ADULT - CONVERSATION DETAILS
Met with marcelo Chase via the phone and pts sister in law at bedside for greater then 16 mins discussing ACP and GOC. All have a good understanding of pts medical conditions and hospital course.  Prognosis of days to weeks provided.  Goal is comfort as they report the pt has been verbal about her wishes and would never want to be kept alive on machine.  Plan for comfort. d/c non essential meds, d/c fluids, vitals daily, no blood draws, plan to transfer to PCU when be available. MOLST and LOC in chart.

## 2025-01-22 NOTE — CONSULT NOTE ADULT - TIME BILLING
symptom assessment and management, supportive counseling, coordination of care, discussion and coordination with team.    I personally spent 30 minutes on advance care planning services with the patient. This time is separate and distinct from any other care management services provided on this date.    Maday Marte, CLIVE-BC  Please contact me via Teams  between 6am-2pm. If not answering, please call the palliative care pager [phone]    After 2pm and on weekends, please see the contact information below:    In the event of newly developing, evolving, or worsening symptoms between 2pm and 5pm please contact the Palliative Medicine via extension [phone] for assistance.  After 5pm please contact team via pager (if the patient is at [facility] #[phone] or if the patient is at [facility] #[phone]) The Geriatric and Palliative Medicine service has coverage 24 hours a day/ 7 days a week to provide medical recommendations regarding symptom management needs via telephone

## 2025-01-22 NOTE — CONSULT NOTE ADULT - SUBJECTIVE AND OBJECTIVE BOX
Time and date of service: 01-22-25 @ 09:53      HPI:  84F DNR/DNI, hx dementia Ox1 at baseline, multiple falls on ASA81 presents from living facility for unwitnessed fall, found on floor with scalp hematoma. 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 (19 Jan 2025 03:41)    PERTINENT PM/SXH:   HTN (hypertension)    Dementia    HTN (hypertension)      No significant past surgical history      FAMILY HISTORY:    Family Hx substance abuse [ ]yes [ ]no    ITEMS NOT CHECKED ARE NOT PRESENT    SOCIAL HISTORY:   Significant other/partner[ ]  Children[ ]  Sabianism/Spirituality:  Substance hx:  [ ]   Tobacco hx:  [ ]   Alcohol hx: [ ]   Home Opioid hx:  [ ] I-Stop Reference No:  Living Situation: [ ]Home  [ ]Long term care  [ ]Rehab [ ]Other    ADVANCE DIRECTIVES:    DNR/MOLST  [ ]  Living Will  [ ]   DECISION MAKER(s):  [ ] Health Care Proxy(s)  [ ] Surrogate(s)  [ ] Guardian           Name(s): Phone Number(s):    BASELINE (I)ADL(s) (prior to admission):  [place]: [ ]Total  [ ] Moderate [ ]Dependent    Allergies    No Known Allergies    Intolerances    MEDICATIONS  (STANDING):  chlorhexidine 4% Liquid 1 Application(s) Topical <User Schedule>  levETIRAcetam   Injectable 250 milliGRAM(s) IV Push every 12 hours    MEDICATIONS  (PRN):  acetaminophen     Tablet .. 650 milliGRAM(s) Oral every 6 hours PRN Temp greater or equal to 38C (100.4F), Mild Pain (1 - 3)  glycopyrrolate Injectable 0.4 milliGRAM(s) IV Push four times a day PRN Secretions  LORazepam   Injectable 0.5 milliGRAM(s) IV Push every 4 hours PRN Anxiety  morphine  - Injectable 2 milliGRAM(s) IV Push every 2 hours PRN Moderate pain (4-6), Severe pain (7-10), Respiratory rate greater than 22  ondansetron Injectable 4 milliGRAM(s) IV Push every 6 hours PRN Nausea and/or Vomiting    PRESENT SYMPTOMS: [ ]Unable to self-report  [ ] CPOT [ ] PAINADs [ ] RDOS  Source if other than patient:  [ ]Family   [ ]Team     Pain: [ ]yes [ ]no  QOL impact -   Location -                    Aggravating factors -  Quality -  Radiation -  Timing-  Severity (0-10 scale):  Minimal acceptable level (0-10 scale):     CPOT:    https://www.[facility].org/getattachment/[email or web address]/Critical-Care-Pain-Observation-Tool-(CPOT)    PAIN AD Score:   http://geriatrictoolkit.[facility]/cog/painad.pdf (press ctrl +  left click to view)    Dyspnea:                           [ ]Mild [ ]Moderate [ ]Severe      RDOS:  0 to 2  minimal or no respiratory distress   3  mild distress  4 to 6 moderate distress  >7 severe distress  https://[email or web address].net/handouts/hen/Respiratory_Distress_Observation_Scale.pdf (Ctrl +  left click to view)     Anxiety:                             [ ]Mild [ ]Moderate [ ]Severe  Fatigue:                             [ ]Mild [ ]Moderate [ ]Severe  Nausea:                             [ ]Mild [ ]Moderate [ ]Severe  Loss of appetite:              [ ]Mild [ ]Moderate [ ]Severe  Constipation:                    [ ]Mild [ ]Moderate [ ]Severe    PCSSQ[Palliative Care Spiritual Screening Question]   Severity (0-10):  Score of 4 or > indicate consideration of Chaplaincy referral.  Chaplaincy Referral: [ ] yes [ ] refused [ ] following [ ] Deferred     Caregiver [place]? : [ ] yes [ ] no [ ] Deferred [ ] Declined             Social work referral [ ] Patient & Family Centered Care Referral [ ]     Anticipatory Grief present?:  [ ] yes [ ] no  [ ] Deferred                  Social work referral [ ] Chaplaincy Referral[ ]      Other Symptoms:  [ ]All other review of systems negative     Palliative Performance Status Version 2:         %    http://npcrc.org/files/news/palliative_performance_scale_ppsv2.pdf  PHYSICAL EXAM:  Vital Signs Last 24 Hrs  T(C): 37.7 (22 Jan 2025 07:00), Max: 38.7 (21 Jan 2025 17:00)  T(F): 99.9 (22 Jan 2025 07:00), Max: 101.7 (21 Jan 2025 17:00)  HR: 72 (22 Jan 2025 07:00) (72 - 83)  BP: 160/79 (22 Jan 2025 07:00) (100/54 - 160/79)  BP(mean): 111 (22 Jan 2025 07:00) (74 - 111)  RR: 21 (22 Jan 2025 07:00) (21 - 24)  SpO2: 88% (22 Jan 2025 07:00) (67% - 88%)    Parameters below as of 22 Jan 2025 07:00  Patient On (Oxygen Delivery Method): room air     I&O's Summary    21 Jan 2025 07:01  -  22 Jan 2025 07:00  --------------------------------------------------------  IN: 0 mL / OUT: 625 mL / NET: -625 mL      GENERAL: [ ]Cachexia    [ ]Alert  [ ]Oriented x   [ ]Lethargic  [ ]Unarousable  [ ]Verbal  [ ]Non-Verbal  Behavioral:   [ ] Anxiety  [ ] Delirium [ ] Agitation [ ] Other  HEENT:  [ ]Normal   [ ]Dry mouth   [ ]ET Tube/Trach  [ ]Oral lesions  PULMONARY:   [ ]Clear [ ]Tachypnea  [ ]Audible excessive secretions   [ ]Rhonchi        [ ]Right [ ]Left [ ]Bilateral  [ ]Crackles        [ ]Right [ ]Left [ ]Bilateral  [ ]Wheezing     [ ]Right [ ]Left [ ]Bilateral  [ ]Diminished breath sounds [ ]right [ ]left [ ]bilateral  CARDIOVASCULAR:    [ ]Regular [ ]Irregular [ ]Tachy  [ ]Jeff [ ]Murmur [ ]Other  GASTROINTESTINAL:  [ ]Soft  [ ]Distended   [ ]+BS  [ ]Non tender [ ]Tender  [ ]Other [ ]PEG [ ]OGT/ NGT  Last BM:  GENITOURINARY:  [ ]Normal [ ] Incontinent   [ ]Oliguria/Anuria   [ ]Lieberman  MUSCULOSKELETAL:   [ ]Normal   [ ]Weakness  [ ]Bed/Wheelchair bound [ ]Edema  NEUROLOGIC:   [ ]No focal deficits  [ ]Cognitive impairment  [ ]Dysphagia [ ]Dysarthria [ ]Paresis [ ]Other   SKIN:   [ ]Normal  [ ]Rash  [ ]Other  [ ]Pressure ulcer(s)       Present on admission [ ]y [ ]n    CRITICAL CARE:  [ ] Shock Present  [ ]Septic [ ]Cardiogenic [ ]Neurologic [ ]Hypovolemic  [ ]  Vasopressors [ ]  Inotropes   [ ]Respiratory failure present [ ]Mechanical ventilation [ ]Non-invasive ventilatory support [ ]High flow    [ ]Acute  [ ]Chronic [ ]Hypoxic  [ ]Hypercarbic [ ]Other  [ ]Other organ failure     LABS:  01-20    143  |  107  |  27[H]  ----------------------------<  86  4.0   |  23  |  0.96    Ca    8.8      20 Jan 2025 12:06  Phos  3.9     01-20  Mg     2.3     01-20        Urinalysis Basic - ( 20 Jan 2025 12:06 )    Color: x / Appearance: x / SG: x / pH: x  Gluc: 86 mg/dL / Ketone: x  / Bili: x / Urobili: x   Blood: x / Protein: x / Nitrite: x   Leuk Esterase: x / RBC: x / WBC x   Sq Epi: x / Non Sq Epi: x / Bacteria: x      RADIOLOGY & ADDITIONAL STUDIES:    PROTEIN CALORIE MALNUTRITION PRESENT: [ ]mild [ ]moderate [ ]severe [ ]underweight [ ]morbid obesity  https://www.andeal.org/vault/2440/web/files/ONC/Table_Clinical%20Characteristics%20to%20Document%20Malnutrition-White%20JV%20et%20al%202012.pdf    Height (cm): 162.6 (01-18-25 @ 22:05), 162.6 (12-14-24 @ 07:09), 170.2 (10-10-24 @ 11:51)  Weight (kg): 92 (01-19-25 @ 08:29), 77.1 (12-14-24 @ 07:09), 90.7 (10-10-24 @ 11:51)  BMI (kg/m2): 34.8 (01-19-25 @ 08:29), 29.2 (01-18-25 @ 22:05), 29.2 (12-14-24 @ 07:09)    [ ]PPSV2 < or = to 30% [ ]significant weight loss  [ ]poor nutritional intake  [ ]anasarca[ ]Artificial Nutrition      Other REFERRALS:  [ ]Hospice  [ ]Child Life  [ ]Social Work  [ ]Case management [ ]Holistic Therapy     Goals of Care Document:  Time and date of service: 01-22-25 @ 09:53      HPI:  84F DNR/DNI, hx dementia Ox1 at baseline, multiple falls on ASA81 presents from living facility for unwitnessed fall, found on floor with scalp hematoma. 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 (19 Jan 2025 03:41)    PERTINENT PM/SXH:   HTN (hypertension)    Dementia    HTN (hypertension)      No significant past surgical history      FAMILY HISTORY: no significant family history    Family Hx substance abuse [ ]yes [x ]no    ITEMS NOT CHECKED ARE NOT PRESENT    SOCIAL HISTORY:   Significant other/partner[ ]  Children[ x]  Mosque/Spirituality:  Substance hx:  [ ]   Tobacco hx:  [ ]   Alcohol hx: [ ]   Home Opioid hx:  [ ] I-Stop Reference No:  Living Situation: [ ]Home  [x ]Long term care  [ ]Rehab [ ]Other    ADVANCE DIRECTIVES:    DNR/MOLST  [x ]  Living Will  [ ]   DECISION MAKER(s):  [ ] Health Care Proxy(s)  [ x] Surrogate(s)  [ ] Guardian           Name(s): Phone Number(s): Gabriel [phone]/ Rena [phone]    BASELINE (I)ADL(s) (prior to admission):  [place]: [ ]Total  x[ ] Moderate [ ]Dependent    Allergies    No Known Allergies    Intolerances    MEDICATIONS  (STANDING):  chlorhexidine 4% Liquid 1 Application(s) Topical <User Schedule>  levETIRAcetam   Injectable 250 milliGRAM(s) IV Push every 12 hours    MEDICATIONS  (PRN):  acetaminophen     Tablet .. 650 milliGRAM(s) Oral every 6 hours PRN Temp greater or equal to 38C (100.4F), Mild Pain (1 - 3)  glycopyrrolate Injectable 0.4 milliGRAM(s) IV Push four times a day PRN Secretions  LORazepam   Injectable 0.5 milliGRAM(s) IV Push every 4 hours PRN Anxiety  morphine  - Injectable 2 milliGRAM(s) IV Push every 2 hours PRN Moderate pain (4-6), Severe pain (7-10), Respiratory rate greater than 22  ondansetron Injectable 4 milliGRAM(s) IV Push every 6 hours PRN Nausea and/or Vomiting    PRESENT SYMPTOMS: [x ]Unable to self-report  [ ] CPOT [ ] PAINADs [ ] RDOS  Source if other than patient:  [ x]Family   x[ ]Team     Pain: [ ]yes [x ]no  QOL impact -   Location -                    Aggravating factors -  Quality -  Radiation -  Timing-  Severity (0-10 scale):  Minimal acceptable level (0-10 scale):     CPOT:    https://www.[facility].org/getattachment/[email or web address]/Critical-Care-Pain-Observation-Tool-(CPOT)    PAIN AD Score:   http://geriatrictoolkit.[facility]/cog/painad.pdf (press ctrl +  left click to view)    Dyspnea:                           [ ]Mild [ ]Moderate [ ]Severe      RDOS:  0 to 2  minimal or no respiratory distress   3  mild distress  4 to 6 moderate distress  >7 severe distress  https://homecareinformation.net/handouts/hen/Respiratory_Distress_Observation_Scale.pdf (Ctrl +  left click to view)     Anxiety:                             [ ]Mild [ ]Moderate [ ]Severe  Fatigue:                             [ ]Mild [ ]Moderate [ ]Severe  Nausea:                             [ ]Mild [ ]Moderate [ ]Severe  Loss of appetite:              [ ]Mild [ ]Moderate [ ]Severe  Constipation:                    [ ]Mild [ ]Moderate [ ]Severe    PCSSQ[Palliative Care Spiritual Screening Question]   Severity (0-10):  Score of 4 or > indicate consideration of Chaplaincy referral.  Chaplaincy Referral: [ x] yes [ ] refused [ ] following [ ] Deferred     Caregiver [place]? : [ ] yes [ ] no x[ ] Deferred [ ] Declined             Social work referral [ ] Patient & Family Centered Care Referral [ ]     Anticipatory Grief present?:  [ ] yes [ ] no  [ x] Deferred                  Social work referral [ ] Chaplaincy Referral[ ]      Other Symptoms:  [ ]All other review of systems negative     Palliative Performance Status Version 2:     10    %    http://npcrc.org/files/news/palliative_performance_scale_ppsv2.pdf  PHYSICAL EXAM:  Vital Signs Last 24 Hrs  T(C): 37.7 (22 Jan 2025 07:00), Max: 38.7 (21 Jan 2025 17:00)  T(F): 99.9 (22 Jan 2025 07:00), Max: 101.7 (21 Jan 2025 17:00)  HR: 72 (22 Jan 2025 07:00) (72 - 83)  BP: 160/79 (22 Jan 2025 07:00) (100/54 - 160/79)  BP(mean): 111 (22 Jan 2025 07:00) (74 - 111)  RR: 21 (22 Jan 2025 07:00) (21 - 24)  SpO2: 88% (22 Jan 2025 07:00) (67% - 88%)    Parameters below as of 22 Jan 2025 07:00  Patient On (Oxygen Delivery Method): room air     I&O's Summary    21 Jan 2025 07:01  -  22 Jan 2025 07:00  --------------------------------------------------------  IN: 0 mL / OUT: 625 mL / NET: -625 mL      GENERAL: [ ]Cachexia    [ ]Alert  [ ]Oriented x   [ ]Lethargic  [x ]Unarousable  [ ]Verbal  [x ]Non-Verbal  Behavioral:   [ ] Anxiety  [ ] Delirium [ ] Agitation [ ] Other  HEENT: secretions  [ ]Normal   [ ]Dry mouth   [ ]ET Tube/Trach  [ ]Oral lesions  PULMONARY:   [ ]Clear [ ]Tachypnea  [x ]Audible excessive secretions   [ ]Rhonchi        [ ]Right [ ]Left [ ]Bilateral  [ ]Crackles        [ ]Right [ ]Left [ ]Bilateral  [ ]Wheezing     [ ]Right [ ]Left [ ]Bilateral  [ ]Diminished breath sounds [ ]right [ ]left [ ]bilateral  CARDIOVASCULAR:    [x ]Regular [ ]Irregular [x ]Tachy  [ ]Jeff [ ]Murmur [ ]Other  GASTROINTESTINAL:  [ ]Soft  [ ]Distended   [x]+BS  [ x]Non tender [ ]Tender  [ ]Other [ ]PEG [ ]OGT/ NGT  Last BM:  GENITOURINARY:  [ ]Normal [ ] Incontinent   [ ]Oliguria/Anuria   [x ]Lieberman  MUSCULOSKELETAL:   [ ]Normal   [ ]Weakness  [x ]Bed/Wheelchair bound [ ]Edema  NEUROLOGIC:   [ ]No focal deficits  [x ]Cognitive impairment  [ ]Dysphagia [ ]Dysarthria [ ]Paresis [ ]Other   SKIN:  see rn flowsheet  [ ]Normal  [ ]Rash  [ ]Other  [ ]Pressure ulcer(s)       Present on admission [ ]y [ ]n    CRITICAL CARE:  [ ] Shock Present  [ ]Septic [ ]Cardiogenic [ ]Neurologic [ ]Hypovolemic  [ ]  Vasopressors [ ]  Inotropes   [ ]Respiratory failure present [ ]Mechanical ventilation [ ]Non-invasive ventilatory support [ ]High flow    [ ]Acute  [ ]Chronic [ ]Hypoxic  [ ]Hypercarbic [ ]Other  [ ]Other organ failure     LABS:  01-20    143  |  107  |  27[H]  ----------------------------<  86  4.0   |  23  |  0.96    Ca    8.8      20 Jan 2025 12:06  Phos  3.9     01-20  Mg     2.3     01-20        Urinalysis Basic - ( 20 Jan 2025 12:06 )    Color: x / Appearance: x / SG: x / pH: x  Gluc: 86 mg/dL / Ketone: x  / Bili: x / Urobili: x   Blood: x / Protein: x / Nitrite: x   Leuk Esterase: x / RBC: x / WBC x   Sq Epi: x / Non Sq Epi: x / Bacteria: x      RADIOLOGY & ADDITIONAL STUDIES:    < from: CT Head No Cont (01.19.25 @ 10:30) >  ACC: [id number] EXAM:  CT BRAIN   ORDERED BY: HECTOR COATS     PROCEDURE DATE:  01/19/2025          INTERPRETATION:  CLINICAL INFORMATION: Intraparenchymal hemorrhage.   Follow-up.    COMPARISON: CT head 1/19/2025 to 2:32 AM and 8:40 AM (under the same   single study) and 12/14/2024.    CONTRAST:  IV Contrast: NONE.    TECHNIQUE:  Serial axial images were obtained from the skull base to the   vertex using multi-slice helical technique. Images were obtained on a   portable CT scanner .    FINDINGS:    VENTRICLES AND SULCI: Left-sided sulcal effacement and ventricular   compression secondary to the adjacent parenchymal hemorrhage. Small   amount of intraventricular hemorrhage layering in the right occipital   horn and in the body of left lateral ventricle. The right side ventricles   and sulci appear normal for age.  INTRA-AXIAL: Redemonstrated intraparenchymal hemorrhage within the left   temporal occipital region. The hemorrhage measures 6.6 x 4.4 cm on axial   planes (AP x TR), and is grossly unchanged. Mass effect on the adjacent   sulci and left lateral ventricle with mild midline shift to the right.   Mild intraventricular hemorrhage. No new areas of hemorrhages are seen.   There are areas of hypoattenuation along the right subcortical and   periventricular white matter, likely reflecting chronic microvascular   type changes.  EXTRA-AXIAL: No mass or fluid collection. Basal cisterns are normal in   appearance.    VISUALIZED SINUSES:  Clear.  TYMPANOMASTOID CAVITIES:  Clear.  VISUALIZED ORBITS: Bilateral lens replacement.  CALVARIUM: Intact.    MISCELLANEOUS: None.      IMPRESSION:    No significant change since 1/19/2025 at 2:32 AM. Left temporal occipital   parenchymal hemorrhage with mass effect and mild midline shift to the   right. Small amount of intraventricular hemorrhage.    --- End of Report ---          DEVORA RAMIREZ MD; Resident Radiologist  This document has been electronically signed.  NATALIO VICTORIA MD; Attending Radiologist  This document has been electronically signed. Jan 19 2025  1:55PM    < end of copied text >      PROTEIN CALORIE MALNUTRITION PRESENT: [ ]mild [ ]moderate [ ]severe [ ]underweight [ ]morbid obesity  https://www.andeal.org/vault/2440/web/files/ONC/Table_Clinical%20Characteristics%20to%20Document%20Malnutrition-White%20JV%20et%20al%203803.pdf    Height (cm): 162.6 (01-18-25 @ 22:05), 162.6 (12-14-24 @ 07:09), 170.2 (10-10-24 @ 11:51)  Weight (kg): 92 (01-19-25 @ 08:29), 77.1 (12-14-24 @ 07:09), 90.7 (10-10-24 @ 11:51)  BMI (kg/m2): 34.8 (01-19-25 @ 08:29), 29.2 (01-18-25 @ 22:05), 29.2 (12-14-24 @ 07:09)    [ ]PPSV2 < or = to 30% [ ]significant weight loss  [ ]poor nutritional intake  [ ]anasarca[ ]Artificial Nutrition      Other REFERRALS:  [ ]Hospice  [ ]Child Life  [ ]Social Work  [ ]Case management [ ]Holistic Therapy     Goals of Care Document:

## 2025-01-22 NOTE — CONSULT NOTE ADULT - ASSESSMENT
84F DNR/DNI, hx dementia Ox1 at baseline, multiple falls on ASA81 presents from living facility for fall. CTH motion limited but when compared to 12/14/25 scan w/ new L temporal IPH (~3z2q7ij). 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 Exam: agitated, no EO, PERRL, BAEZ spon, BUE strong, BLE 2/5. Unable to assess drift, sensation.  - no neurosurgical intervention  - family does not want any invasive procedures but would like to pursure max medical management  - adm NSCU under Dr. Whitehead  - christos  - aru  - keppra 500 BID  - 4hr interval CTH
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 CTH showing new L temporal IPH. Patient admitted to NSCU and s/p ASA reversal with DDAVP.   Exam: currently obtunded patient, not responsive to questions or commands. R side weakness compared to left  CTH:  There is a left temporoparietal 7.3 x 4.5 x 6.2 cm intraparenchymal hemorrhage with mild surrounding edema exerting mass effect on the left occipital horn.    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     Recommendations:  [] NSCU for q1h neurochecks, vitals  [] f/u 24 hour CT scan   [] CTH PRN for worsening exam or mentation  [] goal BP <140/90, cardene drip if needed  [] MRI brain without contrast   [] MRA brain without contrast   [] TTE w/o bubble   [] Implantable loop recorder  [] Lipid panel, HbA1c  [] CXR  [] CBC, CMP, coagulation panel, troponin,  PT/PTT, Fibrinogen, T&S  [] hold all antiplatelets/ anticoagulation; Time to resume dependent on repeat imaging   [] Atorvastatin 80mg (titrate to LDL < 70)   [] DVT prophylaxis: SCD for now   [] NPO unless passes dysphagia screen; swallow eval if fails  [] Telemonitoring  [] Neurological checks and vital signs per unit protocol  [] BGM goals 140-180  [] PT/OT; S/S evaluation  [] seizure, fall, aspiration precautions     IF signs of increased intracranial pressure or herniation (stupor/coma, nausea, vomiting, acute hypertension with bradycardia, unilateral dilated pupil), may need to consider   - hypertonic saline    - Keep T< 38.0° C (100.1° F) with acetaminophen and/or cooling blanket  - BG  ml/dL, target serum sodium 145-155mEq/L  - Elevate head of bed to 30º    Discussed with stroke fellow  Dr. Gonzalez  and under supervision of attending Dr. Gutierrez. Will be formally staffed on morning rounds with attending. Recommendations will be complete once signed by attending.     * Case and plan not final until Attending attestation *
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 CTH motion limited but new L temporal IPH (~[id number]) when compared to MR in September 2024 for dementia w/u which demonstrated chronic hypertensive micro-hemorrages in setting of amyloid angiopathy. Palliative care called for GOC.

## 2025-01-22 NOTE — PROGRESS NOTE ADULT - ATTENDING COMMENTS
Patient is comfort measures only after temporal IPH, pending transfer to PCU.    Medically complex 2/2 comfort measures after ICH  Non-critical care time: 35 minutes

## 2025-01-22 NOTE — PROGRESS NOTE ADULT - ASSESSMENT
ASSESSMENT: HPI:  84F DNR/DNI, hx dementia Ox1 at baseline, multiple falls on ASA81 presents from living facility for unwitnessed fall, found on floor with scalp hematoma. CTH with new L temporal IPH (~2z5n1vk).    NEURO:  comfort measures    Keppra 500 BID for seizure ppx   Pain control with tylenol  Pending palliative consult  Ativan, Morphine, Robinul PRN  Activity: [X] OOB as tolerated [] Bedrest [X] PT [X] OT [] PMNR

## 2025-01-22 NOTE — PROGRESS NOTE ADULT - THIS PATIENT HAS THE FOLLOWING CONDITION(S)/DIAGNOSES ON THIS ADMISSION:
Brain Compression / Herniation
Encephalopathy/Functional Quadriplegia/Cerebral Edema/Brain Compression / Herniation
Encephalopathy/Cerebral Edema/Brain Compression / Herniation

## 2025-01-22 NOTE — PROGRESS NOTE ADULT - SUBJECTIVE AND OBJECTIVE BOX
SUMMARY:  84F DNR/DNI, hx dementia Ox1 at baseline, multiple falls on ASA81 presents from living facility for unwitnessed fall, found on floor with scalp hematoma. 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    ICU COURSE:  1/19: admitted to NSCU.    ADMISSION SCORES:   GCS: 9  ICH Score: 4    REVIEW OF SYSTEMS: None other than stated in HPI    ALLERGIES: Allergies    No Known Allergies    Intolerances    ICU Vital Signs Last 24 Hrs  T(C): 38.4 (21 Jan 2025 03:00), Max: 38.4 (21 Jan 2025 03:00)  T(F): 101.1 (21 Jan 2025 03:00), Max: 101.1 (21 Jan 2025 03:00)  HR: 72 (21 Jan 2025 03:00) (57 - 81)  BP: 119/58 (21 Jan 2025 03:00) (119/58 - 164/71)  BP(mean): 83 (21 Jan 2025 03:00) (83 - 117)  ABP: --  ABP(mean): --  RR: 24 (21 Jan 2025 03:00) (20 - 34)  SpO2: 98% (21 Jan 2025 03:00) (98% - 100%)    O2 Parameters below as of 20 Jan 2025 19:00  Patient On (Oxygen Delivery Method): room air    01-20    143  |  107  |  27[H]  ----------------------------<  86  4.0   |  23  |  0.96    Ca    8.8      20 Jan 2025 12:06  Phos  3.9     01-20  Mg     2.3     01-20        MEDICATIONS  (STANDING):  cefTRIAXone   IVPB 1000 milliGRAM(s) IV Intermittent every 24 hours  chlorhexidine 4% Liquid 1 Application(s) Topical <User Schedule>  cyanocobalamin 1000 MICROGram(s) Oral daily  donepezil 5 milliGRAM(s) Oral at bedtime  folic acid 1 milliGRAM(s) Oral daily  hydrALAZINE 10 milliGRAM(s) Oral two times a day  levETIRAcetam   Injectable 250 milliGRAM(s) IV Push every 12 hours  magnesium sulfate  IVPB 2 Gram(s) IV Intermittent once  memantine 5 milliGRAM(s) Oral daily  mupirocin 2% Nasal 1 Application(s) Both Nostrils two times a day  polyethylene glycol 3350 17 Gram(s) Oral two times a day  pyridoxine 50 milliGRAM(s) Oral two times a day  senna 2 Tablet(s) Oral at bedtime  sodium chloride 2% + sodium acetate 50:50 1000 milliLiter(s) (50 mL/Hr) IV Continuous <Continuous>  vancomycin  IVPB 1250 milliGRAM(s) IV Intermittent every 24 hours    MEDICATIONS  (PRN):  acetaminophen     Tablet .. 650 milliGRAM(s) Oral every 6 hours PRN Temp greater or equal to 38C (100.4F), Mild Pain (1 - 3)            EXAMINATION:  General: No acute distress  HEENT: Anicteric sclerae  Cardiac: E4R5pvd  Lungs: Clear  Abdomen: Soft, non-tender, +BS  Extremities: No c/c/e  Skin/Incision Site: Clean, dry and intact  Neurologic: No EO, pupils PERRL, no FC, BUE localize, BLE withdraw 2/5.

## 2025-01-22 NOTE — CONSULT NOTE ADULT - PROBLEM SELECTOR RECOMMENDATION 9
s/p fall    No significant change since 1/19/2025 at 2:32 AM. Left temporal occipital   parenchymal hemorrhage with mass effect and mild midline shift to the   right. Small amount of intraventricular hemorrhage.

## 2025-01-22 NOTE — PROGRESS NOTE ADULT - NS MD NEURO CONDITIONS_ENCEPHAL
Keep the area of the cut covered with antibiotic ointment and a dressing, at all times, except bathing, for at least the next 5 days.  Watch for signs of infection including:  Redness, swelling, drainage of pus.  If he sees please see a doctor sooner.  Return for any emergencies.  
Neurological

## 2025-01-23 NOTE — PROGRESS NOTE ADULT - NS ATTEST RISK PROBLEM GEN_ALL_CORE FT
IV opioids and/or benzodiazepines prescription, monitoring and titration for symptom management at end of life

## 2025-01-23 NOTE — PROGRESS NOTE ADULT - PROBLEM SELECTOR PLAN 6
- PPS 10%. The patient requires nursing assistance with all ADLs.  - Supportive care.  - Turn and position.  - Continue with good skin care as per hospital protocol.

## 2025-01-23 NOTE — PROGRESS NOTE ADULT - PROBLEM SELECTOR PLAN 2
- Continue with Morphine 2mg IV q1hr PRN for dyspnea. ( 1 required in a 24hr period 7am-7am)   - Bowel regimen while on opioids

## 2025-01-23 NOTE — PROGRESS NOTE ADULT - SUBJECTIVE AND OBJECTIVE BOX
GAP TEAM PALLIATIVE CARE UNIT PROGRESS NOTE:      [  ] Patient on hospice program.    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 CTH motion limited but new L temporal IPH (~7k6x4za) when compared to MR in September 2024 for dementia w/u which demonstrated chronic hypertensive microhemorrages in setting of amyloid angiopathy.     OVERNIGHT EVENTS: Chart reviewed. The patient is seen and examined at the bedside. She required PRN Tylenol X1 for fever, PRN Ativan 0.5mg IV X1 for agitation, PRN Morphine 2mg IV X1 for severe pain and X1 for dyspnea within a 24hr period 7am-7am.    DNR on chart:  DNI  DNI        Allergies    No Known Allergies    Intolerances    MEDICATIONS  (STANDING):  levETIRAcetam   Injectable 250 milliGRAM(s) IV Push every 12 hours    MEDICATIONS  (PRN):  acetaminophen  Suppository .. 650 milliGRAM(s) Rectal every 6 hours PRN Temp greater or equal to 38C (100.4F), Mild Pain (1 - 3)  bisacodyl Suppository 10 milliGRAM(s) Rectal daily PRN Constipation  glycopyrrolate Injectable 0.4 milliGRAM(s) IV Push four times a day PRN Secretions  LORazepam   Injectable 0.5 milliGRAM(s) IV Push every 1 hour PRN Agitation  morphine  - Injectable 1 milliGRAM(s) IV Push every 1 hour PRN Moderate Pain (4 - 6)  morphine  - Injectable 2 milliGRAM(s) IV Push every 1 hour PRN dyspnea  morphine  - Injectable 2 milliGRAM(s) IV Push every 1 hour PRN Severe Pain (7 - 10)  ondansetron Injectable 4 milliGRAM(s) IV Push every 6 hours PRN Nausea and/or Vomiting    ITEMS UNCHECKED ARE NOT PRESENT    PRESENT SYMPTOMS: [X ]Unable to self-report see PAINAD, RDOS below  Source if other than patient:  [ ]Family   [ X]Team     Pain: [ ] yes [ ] no  QOL impact -   Location -                    Aggravating factors -  Quality -  Radiation -  Timing-  Severity (0-10 scale):  Minimal acceptable level (0-10 scale):       PCSSQ [Palliative Care Spiritual Screening Question]   Severity (0-10):  Score of 4 or > indicate consideration of Chaplaincy referral.  Chaplaincy Referral: [ ] yes [ ] refused [ X] following [ ] deferred Last seen on 1/20    Caregiver [place]? : [ X] yes [ ] no [ ] deferred:  Social work referral [X ] Patient & Family Centered Care Referral [ ]     Anticipatory Grief present?:  [ X] yes [ ] no [ ] deferred:  Social work referral [ X] Patient & Family Centered Care Referral [ ]  	  Other Symptoms:  [ ]All other review of systems negative- Unable to self report.    PHYSICAL EXAM:   Vital Signs Last 24 Hrs  T(C): 37.4 (23 Jan 2025 09:31), Max: 37.8 (22 Jan 2025 12:45)  T(F): 99.3 (23 Jan 2025 09:31), Max: 100.1 (22 Jan 2025 12:45)  HR: 80 (22 Jan 2025 12:45) (80 - 80)  BP: 162/93 (23 Jan 2025 09:31) (162/93 - 163/84)  BP(mean): --  RR: 20 (23 Jan 2025 06:00) (20 - 20)  SpO2: 86% (23 Jan 2025 06:00) (85% - 86%)    Parameters below as of 23 Jan 2025 06:00  Patient On (Oxygen Delivery Method): room air     I&O's Summary    22 Jan 2025 07:01  -  23 Jan 2025 07:00  --------------------------------------------------------  IN: 0 mL / OUT: 585 mL / NET: -585 mL      GENERAL: [ ] Cachexia  [ ]Alert  [ ]Oriented x   [ ]Lethargic  [ X]Unarousable  [ ]Verbal  [X ]Non-Verbal  Behavioral:   [ ] Anxiety  [ ] Delirium [ ] Agitation [ ] Other  HEENT:  [ ]Normal   [ X]Dry mouth   [ ]ET Tube/Trach  [ ]Oral lesions  PULMONARY:   [ ]Clear [ ]Tachypnea  [X ]Audible excessive secretions   [ ]Rhonchi        [ ]Right [ ]Left [ ]Bilateral  [ ]Crackles        [ ]Right [ ]Left [ ]Bilateral  [ ]Wheezing     [ ]Right [ ]Left [ ]Bilateral  [ ]Diminished BS [ ]Right [ ]Left [ ]Bilateral    CARDIOVASCULAR:    [ X]Regular [ ]Irregular [ ]Tachy  [ ]Jeff [ ]Murmur [ ]Other  GASTROINTESTINAL:  [X ]Soft  [ ]Distended   [ X]+BS  [ X]Non tender [ ]Tender  [ ]Other [ ]PEG [ ]OGT/ NGT   Last BM:  Unknown   GENITOURINARY:  [X ]Normal [X ] Incontinent   [ ]Oliguria/Anuria   [X ]Lieberman  MUSCULOSKELETAL:   [ ]Normal   [ X]Weakness  [X ]Bed/Wheelchair bound [ ]Edema  NEUROLOGIC:   [ ]No focal deficits  [ X] Cognitive impairment  [ ] Dysphagia [ ]Dysarthria [ ] Paresis [ ]Other   SKIN:   [ ]Normal  [ ]Rash  [ ]Other  [X ]Pressure ulcer(s)  [ ]y [ ]n  Present on admission  sacrum and b/l heel DTI. Please see nursing assessment for further details for which I have reviewed.     CRITICAL CARE:  [ ] Shock Present  [ ]Septic [ ]Cardiogenic [ ]Neurologic [ ]Hypovolemic  [ ]  Vasopressors [ ]  Inotropes   [ ] Respiratory failure present [ ] Mechanical Ventilation [ ] Non-invasive ventilatory support [ ] High-Flow    [ ] Acute  [ ] Chronic [ ] Hypoxic  [ ] Hypercarbic [ ] Other  [X ] Other organ failure- Skin, brain    LABS: Reviewed.    RADIOLOGY & ADDITIONAL STUDIES: Reviewed    PROTEIN CALORIE MALNUTRITION: [ ] mild [ ] moderate [ ] severe  [ ] underweight [ ] morbid obesity    https://www.andeal.org/vault/5980/web/files/ONC/Table_Clinical%20Characteristics%20to%20Document%20Malnutrition-White%20JV%20et%20al%202012.pdf      [ X] PPSV2 < or = 30% [ ] significant weight loss [ ] poor nutritional intake [ ] anasarca   Artificial Nutrition [ ]     Other REFERRALS:    [ ] Hospice  [ ]Child Life  [X ]Social Work  [ ]Case management [ ]Holistic Therapy [ ] Physical Therapy [ ] Dietary       Palliative Performance Scale:  http://[email or web address].org/files/news/palliative_performance_scale_ppsv2.pdf  (Ctrl +  left click to view)  Respiratory Distress Observation Tool:  https://homecareinformation.net/handouts/hen/Respiratory_Distress_Observation_Scale.pdf (Ctrl +  left click to view)  PAINAD Score:  http://geriatrictoolkit.[facility]/cog/painad.pdf (Ctrl +  left click to view)

## 2025-01-23 NOTE — PROGRESS NOTE ADULT - ASSESSMENT
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 CTH motion limited but new L temporal IPH (~9y6a8ls) when compared to MR in September 2024 for dementia w/u which demonstrated chronic hypertensive micro-hemorrages in setting of amyloid angiopathy. GAP team consulted for GOC. Patient transferred to the PCU for management of symptoms and disposition planning which will be guided by clinical course.

## 2025-01-23 NOTE — PROGRESS NOTE ADULT - PROBLEM SELECTOR PLAN 7
- CT head: Left temporal occipital parenchymal hemorrhage with mass effect and mild midline shift to the   right. Small amount of intraventricular hemorrhage.

## 2025-01-23 NOTE — CHART NOTE - NSCHARTNOTEFT_GEN_A_CORE
RN paged, patient having refractory fevers all day.  Received 1 tylenol 650 mg supp this morning and another 1g tylenol a few hours ago.  Patient within 4g tylenol 24 limit.  RN staff advised another 1g tylenol IV + toradol 15 mg IV.  For further fever control, can attempt external cooling with ice pack/cool compress to forehead/groin/axilla.  If still with fever will attempt small IV fluid bolus, per RN patient is already having excessive secretions, so will hold off fluids unless needed.

## 2025-01-23 NOTE — PROGRESS NOTE ADULT - PROBLEM SELECTOR PLAN 8
- I spoke with the patient's daughter at bedside, updated as to current clinical condition and plan of care.  Educated as to what to expect, questions answered and emotional support provided.

## 2025-01-24 NOTE — PROGRESS NOTE ADULT - SUBJECTIVE AND OBJECTIVE BOX
GAP TEAM PALLIATIVE CARE UNIT PROGRESS NOTE:      [  ] Patient on hospice program.    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     OVERNIGHT EVENTS: Chart reviewed. The patient is seen and examined at the bedside. She required PRN Tylenol X1 for fever, PRN Morphine 2mg IV X2 for dyspnea and PRN Robinul 0.4mg IV X3 for secretions within a 24hr period 7am-7am.    DNR on chart:  DNI  DNI        Allergies    No Known Allergies    Intolerances    MEDICATIONS  (STANDING):  glycopyrrolate Injectable 0.4 milliGRAM(s) IV Push every 6 hours  levETIRAcetam   Injectable 250 milliGRAM(s) IV Push every 12 hours  morphine  - Injectable 2 milliGRAM(s) IV Push every 4 hours    MEDICATIONS  (PRN):  acetaminophen  Suppository .. 650 milliGRAM(s) Rectal every 6 hours PRN Temp greater or equal to 38C (100.4F), Mild Pain (1 - 3)  bisacodyl Suppository 10 milliGRAM(s) Rectal daily PRN Constipation  LORazepam   Injectable 0.5 milliGRAM(s) IV Push every 1 hour PRN Agitation  morphine  - Injectable 2 milliGRAM(s) IV Push every 1 hour PRN dyspnea  morphine  - Injectable 4 milliGRAM(s) IV Push every 1 hour PRN Severe Pain (7 - 10)  morphine  - Injectable 2 milliGRAM(s) IV Push every 1 hour PRN Moderate Pain (4 - 6)  ondansetron Injectable 4 milliGRAM(s) IV Push every 6 hours PRN Nausea and/or Vomiting    ITEMS UNCHECKED ARE NOT PRESENT    PRESENT SYMPTOMS: [ X]Unable to self-report see PAINAD, RDOS below  Source if other than patient:  [ ]Family   [ X]Team     Pain: [ ] yes [ ] no  QOL impact -   Location -                    Aggravating factors -  Quality -  Radiation -  Timing-  Severity (0-10 scale):  Minimal acceptable level (0-10 scale):       PCSSQ [Palliative Care Spiritual Screening Question]   Severity (0-10):  Score of 4 or > indicate consideration of Chaplaincy referral.  Chaplaincy Referral: [ ] yes [ ] refused [ X] following [ ] deferred Last seen on 1/23    Caregiver [place]? : [ X] yes [ ] no [ ] deferred:  Social work referral [X ] Patient & Family Centered Care Referral [ ]     Anticipatory Grief present?:  [ X] yes [ ] no [ ] deferred:  Social work referral [ X] Patient & Family Centered Care Referral [ ]  	  Other Symptoms:  [ ]All other review of systems negative- Unable to self report    PHYSICAL EXAM:   Vital Signs Last 24 Hrs  T(C): 37.4 (24 Jan 2025 08:12), Max: 38.7 (23 Jan 2025 18:05)  T(F): 99.4 (24 Jan 2025 08:12), Max: 101.7 (23 Jan 2025 18:05)  HR: 74 (24 Jan 2025 08:12) (74 - 74)  BP: 158/87 (24 Jan 2025 08:12) (158/87 - 158/87)  BP(mean): --  RR: 20 (24 Jan 2025 08:12) (20 - 20)  SpO2: 91% (24 Jan 2025 08:12) (91% - 91%)    Parameters below as of 24 Jan 2025 08:12  Patient On (Oxygen Delivery Method): room air     I&O's Summary    23 Jan 2025 07:01  -  24 Jan 2025 07:00  --------------------------------------------------------  IN: 0 mL / OUT: 500 mL / NET: -500 mL    GENERAL: [ ] Cachexia  [ ]Alert  [ ]Oriented x   [ ]Lethargic  [ X]Unarousable  [ ]Verbal  [X ]Non-Verbal  Behavioral:   [ ] Anxiety  [ ] Delirium [ ] Agitation [ ] Other  HEENT:  [ ]Normal   [ X]Dry mouth   [ ]ET Tube/Trach  [ ]Oral lesions  PULMONARY:   [ ]Clear [ ]Tachypnea  [X ]Audible excessive secretions   [ ]Rhonchi        [ ]Right [ ]Left [ ]Bilateral  [ ]Crackles        [ ]Right [ ]Left [ ]Bilateral  [ ]Wheezing     [ ]Right [ ]Left [ ]Bilateral  [ ]Diminished BS [ ]Right [ ]Left [ ]Bilateral    CARDIOVASCULAR:    [ X]Regular [ ]Irregular [ ]Tachy  [ ]Jeff [ ]Murmur [ ]Other  GASTROINTESTINAL:  [X ]Soft  [ ]Distended   [ X]+BS  [ X]Non tender [ ]Tender  [ ]Other [ ]PEG [ ]OGT/ NGT   Last BM:  Unknown   GENITOURINARY:  [X ]Normal [X ] Incontinent   [ ]Oliguria/Anuria   [X ]Lieberman  MUSCULOSKELETAL:   [ ]Normal   [ X]Weakness  [X ]Bed/Wheelchair bound [ ]Edema  NEUROLOGIC:   [ ]No focal deficits  [ X] Cognitive impairment  [ ] Dysphagia [ ]Dysarthria [ ] Paresis [ ]Other   SKIN:   [ ]Normal  [ ]Rash  [ ]Other  [X ]Pressure ulcer(s)  [ ]y [ ]n  Present on admission  sacrum and b/l heel DTI. Please see nursing assessment for further details for which I have reviewed.     CRITICAL CARE:  [ ] Shock Present  [ ]Septic [ ]Cardiogenic [ ]Neurologic [ ]Hypovolemic  [ ]  Vasopressors [ ]  Inotropes   [ ] Respiratory failure present [ ] Mechanical Ventilation [ ] Non-invasive ventilatory support [ ] High-Flow    [ ] Acute  [ ] Chronic [ ] Hypoxic  [ ] Hypercarbic [ ] Other  [X ] Other organ failure- Skin, brain    LABS: None new.    RADIOLOGY & ADDITIONAL STUDIES: None new.    PROTEIN CALORIE MALNUTRITION: [ ] mild [ ] moderate [ ] severe  [ ] underweight [ ] morbid obesity    https://www.andeal.org/vault/2440/web/files/ONC/Table_Clinical%20Characteristics%20to%20Document%20Malnutrition-White%20JV%20et%20al%202012.pdf      [X ] PPSV2 < or = 30% [ ] significant weight loss [ ] poor nutritional intake [ ] anasarca   Artificial Nutrition [ ]     Other REFERRALS:    [ ] Hospice  [ ]Child Life  [X ]Social Work  [ ]Case management [ ]Holistic Therapy [ ] Physical Therapy [ ] Dietary         Palliative Performance Scale:  http://[facility].org/files/news/palliative_performance_scale_ppsv2.pdf  (Ctrl +  left click to view)  Respiratory Distress Observation Tool:  https://homecareinformation.net/handouts/hen/Respiratory_Distress_Observation_Scale.pdf (Ctrl +  left click to view)  PAINAD Score:  http://geriatrictoolkit.[facility]/cog/painad.pdf (Ctrl +  left click to view)   GAP TEAM PALLIATIVE CARE UNIT PROGRESS NOTE:      [  ] Patient on hospice program.    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     OVERNIGHT EVENTS: Chart reviewed. The patient is seen and examined at the bedside. She required PRN Tylenol X1 for fever, PRN Morphine 2mg IV X2 for dyspnea and PRN Robinul 0.4mg IV X3 for secretions within a 24hr period 7am-7am. Morphine was adjusted yesterday afternoon.    DNR on chart:  DNI  DNI        Allergies    No Known Allergies    Intolerances    MEDICATIONS  (STANDING):  glycopyrrolate Injectable 0.4 milliGRAM(s) IV Push every 6 hours  levETIRAcetam   Injectable 250 milliGRAM(s) IV Push every 12 hours  morphine  - Injectable 2 milliGRAM(s) IV Push every 4 hours    MEDICATIONS  (PRN):  acetaminophen  Suppository .. 650 milliGRAM(s) Rectal every 6 hours PRN Temp greater or equal to 38C (100.4F), Mild Pain (1 - 3)  bisacodyl Suppository 10 milliGRAM(s) Rectal daily PRN Constipation  LORazepam   Injectable 0.5 milliGRAM(s) IV Push every 1 hour PRN Agitation  morphine  - Injectable 2 milliGRAM(s) IV Push every 1 hour PRN dyspnea  morphine  - Injectable 4 milliGRAM(s) IV Push every 1 hour PRN Severe Pain (7 - 10)  morphine  - Injectable 2 milliGRAM(s) IV Push every 1 hour PRN Moderate Pain (4 - 6)  ondansetron Injectable 4 milliGRAM(s) IV Push every 6 hours PRN Nausea and/or Vomiting    ITEMS UNCHECKED ARE NOT PRESENT    PRESENT SYMPTOMS: [ X]Unable to self-report see PAINAD, RDOS below  Source if other than patient:  [ ]Family   [ X]Team     Pain: [ ] yes [ ] no  QOL impact -   Location -                    Aggravating factors -  Quality -  Radiation -  Timing-  Severity (0-10 scale):  Minimal acceptable level (0-10 scale):       PCSSQ [Palliative Care Spiritual Screening Question]   Severity (0-10):  Score of 4 or > indicate consideration of Chaplaincy referral.  Chaplaincy Referral: [ ] yes [ ] refused [ X] following [ ] deferred Last seen on 1/23    Caregiver [place]? : [ X] yes [ ] no [ ] deferred:  Social work referral [X ] Patient & Family Centered Care Referral [ ]     Anticipatory Grief present?:  [ X] yes [ ] no [ ] deferred:  Social work referral [ X] Patient & Family Centered Care Referral [ ]  	  Other Symptoms:  [ ]All other review of systems negative- Unable to self report    PHYSICAL EXAM:   Vital Signs Last 24 Hrs  T(C): 37.4 (24 Jan 2025 08:12), Max: 38.7 (23 Jan 2025 18:05)  T(F): 99.4 (24 Jan 2025 08:12), Max: 101.7 (23 Jan 2025 18:05)  HR: 74 (24 Jan 2025 08:12) (74 - 74)  BP: 158/87 (24 Jan 2025 08:12) (158/87 - 158/87)  BP(mean): --  RR: 20 (24 Jan 2025 08:12) (20 - 20)  SpO2: 91% (24 Jan 2025 08:12) (91% - 91%)    Parameters below as of 24 Jan 2025 08:12  Patient On (Oxygen Delivery Method): room air     I&O's Summary    23 Jan 2025 07:01  -  24 Jan 2025 07:00  --------------------------------------------------------  IN: 0 mL / OUT: 500 mL / NET: -500 mL    GENERAL: [ ] Cachexia  [ ]Alert  [ ]Oriented x   [ ]Lethargic  [ X]Unarousable  [ ]Verbal  [X ]Non-Verbal  Behavioral:   [ ] Anxiety  [ ] Delirium [ ] Agitation [ ] Other  HEENT:  [ ]Normal   [ X]Dry mouth   [ ]ET Tube/Trach  [ ]Oral lesions  PULMONARY:   [ ]Clear [ ]Tachypnea  [X ]Audible excessive secretions   [ ]Rhonchi        [ ]Right [ ]Left [ ]Bilateral  [ ]Crackles        [ ]Right [ ]Left [ ]Bilateral  [ ]Wheezing     [ ]Right [ ]Left [ ]Bilateral  [ ]Diminished BS [ ]Right [ ]Left [ ]Bilateral    CARDIOVASCULAR:    [ X]Regular [ ]Irregular [ ]Tachy  [ ]Jeff [ ]Murmur [ ]Other  GASTROINTESTINAL:  [X ]Soft  [ ]Distended   [ X]+BS  [ X]Non tender [ ]Tender  [ ]Other [ ]PEG [ ]OGT/ NGT   Last BM:  Unknown   GENITOURINARY:  [X ]Normal [X ] Incontinent   [ ]Oliguria/Anuria   [X ]Lieberman  MUSCULOSKELETAL:   [ ]Normal   [ X]Weakness  [X ]Bed/Wheelchair bound [ ]Edema  NEUROLOGIC:   [ ]No focal deficits  [ X] Cognitive impairment  [ ] Dysphagia [ ]Dysarthria [ ] Paresis [ ]Other   SKIN:   [ ]Normal  [ ]Rash  [ ]Other  [X ]Pressure ulcer(s)  [ ]y [ ]n  Present on admission  sacrum and b/l heel DTI. Please see nursing assessment for further details for which I have reviewed.     CRITICAL CARE:  [ ] Shock Present  [ ]Septic [ ]Cardiogenic [ ]Neurologic [ ]Hypovolemic  [ ]  Vasopressors [ ]  Inotropes   [ ] Respiratory failure present [ ] Mechanical Ventilation [ ] Non-invasive ventilatory support [ ] High-Flow    [ ] Acute  [ ] Chronic [ ] Hypoxic  [ ] Hypercarbic [ ] Other  [X ] Other organ failure- Skin, brain    LABS: None new.    RADIOLOGY & ADDITIONAL STUDIES: None new.    PROTEIN CALORIE MALNUTRITION: [ ] mild [ ] moderate [ ] severe  [ ] underweight [ ] morbid obesity    https://www.andeal.org/vault/2440/web/files/ONC/Table_Clinical%20Characteristics%20to%20Document%20Malnutrition-White%20JV%20et%20al%202012.pdf      [X ] PPSV2 < or = 30% [ ] significant weight loss [ ] poor nutritional intake [ ] anasarca   Artificial Nutrition [ ]     Other REFERRALS:    [ ] Hospice  [ ]Child Life  [X ]Social Work  [ ]Case management [ ]Holistic Therapy [ ] Physical Therapy [ ] Dietary         Palliative Performance Scale:  http://[facility].org/files/news/palliative_performance_scale_ppsv2.pdf  (Ctrl +  left click to view)  Respiratory Distress Observation Tool:  https://[email or web address]/handouts/hen/Respiratory_Distress_Observation_Scale.pdf (Ctrl +  left click to view)  PAINAD Score:  http://geriatrictoolkit.[facility]/cog/painad.pdf (Ctrl +  left click to view)

## 2025-01-24 NOTE — PROGRESS NOTE ADULT - PROBLEM SELECTOR PLAN 3
- Morphine 2mg IV q4hr ATC.  - Morphine 2mg IV q1hr PRN for moderate pain.  - Morphine 4mg IV q1hr PRN for severe pain.  - Bowel regimen while on opioids
- Continue with Morphine 1mg IV q1hr PRN for moderate pain. ( None required in a 24hr period 7am-7am)   - Continue with Morphine 2mg IV q1hr PRN for severe pain ( 1 required in a 24hr period 7am-7am)   - Bowel regimen while on opioids

## 2025-01-24 NOTE — PROGRESS NOTE ADULT - ASSESSMENT
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 CTH motion limited but new L temporal IPH (~4q4m0xi) when compared to MR in September 2024 for dementia w/u which demonstrated chronic hypertensive micro-hemorrages in setting of amyloid angiopathy. GAP team consulted for GOC. Patient transferred to the PCU for management of symptoms and disposition planning which will be guided by clinical course.

## 2025-01-24 NOTE — PROGRESS NOTE ADULT - PROBLEM SELECTOR PLAN 7
- CT head: Left temporal occipital parenchymal hemorrhage with mass effect and mild midline shift to the   right. Small amount of intraventricular hemorrhage. - I called and spoke with the patient's son, updated as to current clinical condition and plan of care.  Educated as to what to expect, questions answered and emotional support provided.

## 2025-01-24 NOTE — PROGRESS NOTE ADULT - NS ATTEND AMEND GEN_ALL_CORE FT
84F with dementia Ox1 at baseline, multiple falls on ASA81 presents from living facility for unwitnessed fall, found on floor with scalp hematoma. 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     Patient seen this morning, lethargic and did not awaken to physical stimuli. She is having ongoing fevers today, along with dyspnea, requiring several doses of IV morphine. As such, will add IV morphine 2 mg q4 hours given frequent PRN use, and increase PRNs to IV morphine 2-4 mg q1 prn mod-severe pain/dyspnea. Support provided to family at bedside. Maintain care in PCU, dispo planning TBD pending course.    Loreta Greco MD  GAP Team Consults  Please call if we can be of assistance, [phone]
84F with dementia Ox1 at baseline, multiple falls on ASA81 presents from living facility for unwitnessed fall, found on floor with scalp hematoma. CTH motion limited but new L temporal IPH (~7k4i0yf) when compared to MR in September 2024 for dementia w/u which demonstrated chronic hypertensive microhemorrages in setting of amyloid angiopathy.     Patient seen this morning, lethargic and did not awaken to physical stimuli. Audible secretions noted on exam, pt required 3 doses of IV glycopyrrolate in the past 24 hours. Agree with starting around the clock IV glycopyrrolate 0.4 mg q6 hours today. Continue present management otherwise, pt continues to require inpatient level of care for symptom management at end of life.    An MD Fannie  GAP Team Consults  Please call if we can be of assistance, [phone].

## 2025-01-24 NOTE — PROGRESS NOTE ADULT - PROBLEM SELECTOR PLAN 2
- Morphine 2mg IV q4hr ATC.  - Morphine 4mg IV q1hr PRN dyspnea. ( 2 required in a 24hr period 7am-7am)   - Bowel regimen while on opioids - Morphine 2mg IV X2 required prior to changes yesterday afternoon.  - Morphine 2mg IV q4hr ATC.  - Morphine 4mg IV q1hr PRN dyspnea.   - Bowel regimen while on opioids

## 2025-01-24 NOTE — PROGRESS NOTE ADULT - PROBLEM SELECTOR PLAN 5
- Patient febrile this morning. Tylenol given with no relief. One time dose of decadron ordered. - PPS 10%. The patient requires nursing assistance with all ADLs.  - Supportive care.  - Turn and position.  - Continue with good skin care as per hospital protocol.

## 2025-01-24 NOTE — PROGRESS NOTE ADULT - PROBLEM SELECTOR PLAN 1
- Patient with audible secretions.  - Turn and position for postural drainage.  - Robinul 0.4mg IV q6hr PRN.
- Required Robinul 0.4mg IV X3 within a 24hr period 7am-7am.  - Change Robinul to 0.4mg IV q6hr ATC.  - Patient with audible secretions.  - Turn and position for postural drainage.

## 2025-01-24 NOTE — PROGRESS NOTE ADULT - PROBLEM SELECTOR PLAN 6
- PPS 10%. The patient requires nursing assistance with all ADLs.  - Supportive care.  - Turn and position.  - Continue with good skin care as per hospital protocol. - CT head: Left temporal occipital parenchymal hemorrhage with mass effect and mild midline shift to the   right. Small amount of intraventricular hemorrhage.

## 2025-01-24 NOTE — PROGRESS NOTE ADULT - PROBLEM SELECTOR PLAN 4
- Continue with Ativan 0.5mg IV q1hr PRN. ( 2 required in a 24hr period 7am-7am) - Continue with Ativan 0.5mg IV q1hr PRN. ( None required in a 24hr period 7am-7am)

## 2025-01-25 NOTE — PROVIDER CONTACT NOTE (OTHER) - RECOMMENDATIONS
Patient pronounced dead at 0248  Daughter, Valeria, called and notified Patient pronounced dead at 0248  Daughter, Valeria Hager, called and notified

## 2025-01-25 NOTE — DISCHARGE NOTE FOR THE EXPIRED PATIENT - HOSPITAL COURSE
Additional comments: 84F with dementia Ox1 at baseline, multiple falls on ASA81 presents from living facility for unwitnessed fall, found on floor with scalp hematoma. CTH motion limited but new L temporal IPH (~[id number]) when compared to MR in September 2024 for dementia w/u which demonstrated chronic hypertensive microhemorrages in setting of amyloid angiopathy. transferred to pcu for symptom management.    Additional comments: 84F with dementia Ox1 at baseline, multiple falls on ASA81 presents from living facility for unwitnessed fall, found on floor with scalp hematoma. CTH motion limited but new L temporal IPH (~[id number]) when compared to MR in September 2024. New findings of IPH suspected to be due to fall / history of Cerebral amyloid angiopathy. Transferred to pcu for symptom management.

## 2025-01-25 NOTE — CHART NOTE - NSCHARTNOTEFT_GEN_A_CORE
Notified by RN pt  at 02:48. Case discussed with Medical Examiner, Hipolito Dunn. ME states pt is not a candidate for acceptance for ME.     The following person(s) were in the room during examination:    Physical Examination:  No signs of respiratory effort: No spontaneous respirations, No respiratory sounds after 5 minutes of auscultation  No response to verbal stimuli: eyes remained closed, no facial changes or sounds made by patient   No response to painful stimuli: nonresponsive sternal rub, corneal reflex absent  No pupillary response to light: fixed and dilated  No central pulse: carotid, radial, femoral evaluated   No heart sounds after 5 minutes of auscultation; EKG rhythm strip shows asystole.        Patient pronounced dead at 02:48. Attending notified.  Family _ autopsy.      Vikas Cruz PA-C  Role: Provider Notified by RN pt  at 02:48. Case discussed with Medical Examiner, Hipolito Dunn. ME states pt is not a candidate for acceptance for ME. Case number [id number]. Pt's daughter Valeria Hager notified by RN.       Physical Examination:  No signs of respiratory effort: No spontaneous respirations, No respiratory sounds after 5 minutes of auscultation  No response to verbal stimuli: eyes remained closed, no facial changes or sounds made by patient   No response to painful stimuli: nonresponsive sternal rub, corneal reflex absent  No pupillary response to light: fixed and dilated  No central pulse: carotid, radial, femoral evaluated   No heart sounds after 5 minutes of auscultation; EKG rhythm strip shows asystole.        Patient pronounced dead at 02:48. Attending notified.  Family _ autopsy.      Vikas Cruz PA-C  Role: Provider Notified by RN pt  at 02:48. Case discussed with Medical Examiner, Hipolito Dunn. ME states pt is not a candidate for acceptance for ME. Case number [id number]. Pt's daughter Valeria Hager notified by RN.       Physical Examination:  No signs of respiratory effort: No spontaneous respirations, No respiratory sounds after 5 minutes of auscultation  No response to verbal stimuli: eyes remained closed, no facial changes or sounds made by patient   No response to painful stimuli: nonresponsive sternal rub, corneal reflex absent  No pupillary response to light: fixed and dilated  No central pulse: carotid, radial, femoral evaluated   No heart sounds after 5 minutes of auscultation; EKG rhythm strip shows asystole.       Attending notified.  Family _ autopsy.      Vikas Cruz PA-C  Role: Provider Notified by RN pt  at 02:48. Case discussed with Medical Examiner, Hipolito Dunn. Not a ME case. Case number [id number]. Pt's daughter Valeria Hager notified by RN.     Vikas Cruz PA-C  Role: Provider
